# Patient Record
Sex: FEMALE | Race: BLACK OR AFRICAN AMERICAN | NOT HISPANIC OR LATINO | Employment: FULL TIME | URBAN - METROPOLITAN AREA
[De-identification: names, ages, dates, MRNs, and addresses within clinical notes are randomized per-mention and may not be internally consistent; named-entity substitution may affect disease eponyms.]

---

## 2017-10-26 ENCOUNTER — APPOINTMENT (OUTPATIENT)
Dept: LAB | Facility: HOSPITAL | Age: 32
End: 2017-10-26
Payer: COMMERCIAL

## 2017-10-26 ENCOUNTER — TRANSCRIBE ORDERS (OUTPATIENT)
Dept: LAB | Facility: HOSPITAL | Age: 32
End: 2017-10-26

## 2017-10-26 DIAGNOSIS — Z11.1 SCREENING EXAMINATION FOR PULMONARY TUBERCULOSIS: ICD-10-CM

## 2017-10-26 DIAGNOSIS — Z11.1 SCREENING EXAMINATION FOR PULMONARY TUBERCULOSIS: Primary | ICD-10-CM

## 2017-10-26 PROCEDURE — 36415 COLL VENOUS BLD VENIPUNCTURE: CPT

## 2017-10-26 PROCEDURE — 86480 TB TEST CELL IMMUN MEASURE: CPT

## 2017-10-28 LAB
ANNOTATION COMMENT IMP: NORMAL
GAMMA INTERFERON BACKGROUND BLD IA-ACNC: 0.03 IU/ML
M TB IFN-G BLD-IMP: NEGATIVE
M TB IFN-G CD4+ BCKGRND COR BLD-ACNC: <0.01 IU/ML
M TB IFN-G CD4+ T-CELLS BLD-ACNC: 0.02 IU/ML
MITOGEN IGNF BLD-ACNC: 9.94 IU/ML
QUANTIFERON-TB GOLD IN TUBE: NORMAL
SERVICE CMNT-IMP: NORMAL

## 2018-02-13 ENCOUNTER — OFFICE VISIT (OUTPATIENT)
Dept: URGENT CARE | Facility: MEDICAL CENTER | Age: 33
End: 2018-02-13
Payer: COMMERCIAL

## 2018-02-13 VITALS
RESPIRATION RATE: 18 BRPM | DIASTOLIC BLOOD PRESSURE: 82 MMHG | TEMPERATURE: 98 F | SYSTOLIC BLOOD PRESSURE: 142 MMHG | WEIGHT: 230 LBS | HEART RATE: 82 BPM | HEIGHT: 69 IN | BODY MASS INDEX: 34.07 KG/M2 | OXYGEN SATURATION: 97 %

## 2018-02-13 DIAGNOSIS — J01.10 ACUTE FRONTAL SINUSITIS, RECURRENCE NOT SPECIFIED: Primary | ICD-10-CM

## 2018-02-13 PROCEDURE — 99203 OFFICE O/P NEW LOW 30 MIN: CPT | Performed by: FAMILY MEDICINE

## 2018-02-13 PROCEDURE — S9088 SERVICES PROVIDED IN URGENT: HCPCS | Performed by: FAMILY MEDICINE

## 2018-02-13 RX ORDER — AMOXICILLIN 875 MG/1
875 TABLET, COATED ORAL 2 TIMES DAILY
Qty: 20 TABLET | Refills: 0 | Status: SHIPPED | OUTPATIENT
Start: 2018-02-13 | End: 2018-02-23

## 2018-02-13 NOTE — LETTER
February 13, 2018   2  Patient: Jae Caruso   YOB: 1985   Date of Visit: 2/13/2018       To Whom It May Concern: It is my medical opinion that Jae Caruso may return to work on 2/14/2018  If you have any questions or concerns, please don't hesitate to call           Sincerely,        Vahe Mcmillan MD    CC: No Recipients

## 2018-02-13 NOTE — PROGRESS NOTES
Assessment/Plan:    Patient Instructions   I prescribed amoxicillin 875 mg twice a day for 10 days  I encouraged patient continue with Mucinex DM which she is currently taking  Diagnoses and all orders for this visit:    Acute frontal sinusitis, recurrence not specified  -     amoxicillin (AMOXIL) 875 mg tablet; Take 1 tablet (875 mg total) by mouth 2 (two) times a day for 10 days          Subjective:      Patient ID: Nancy Shelley 29 y o  female      30-year-old female here today with 10 day history of sinus pain and pressure  Describes frontal headache, nasal congestion  However, claims to have clear nasal discharge  It is accompanied by postnasal drip  Denies any cough  Currently taking Mucinex DM with minimal improvement  Denies any fever or chills  Headache    Associated symptoms include coughing and sinus pressure  Fatigue   Associated symptoms include coughing, fatigue and headaches  The following portions of the patient's history were reviewed and updated as appropriate: allergies, current medications, past family history, past medical history, past social history, past surgical history and problem list     Review of Systems   Constitutional: Positive for fatigue  HENT: Positive for sinus pain and sinus pressure  Respiratory: Positive for cough  Neurological: Positive for headaches  Objective:    Physical Exam   Constitutional: She appears well-developed and well-nourished  HENT:   Frontal sinus tenderness  Hypertrophic turbinates bilaterally  Neck: Normal range of motion  Neck supple  Cardiovascular: Normal rate, regular rhythm, normal heart sounds and intact distal pulses  Nursing note and vitals reviewed        Vitals:    02/13/18 1300   BP: 142/82   Pulse: 82   Resp: 18   Temp: 98 °F (36 7 °C)   SpO2: 97%   Weight: 104 kg (230 lb)   Height: 5' 9" (1 753 m)

## 2018-02-13 NOTE — PATIENT INSTRUCTIONS
I prescribed amoxicillin 875 mg twice a day for 10 days  I encouraged patient continue with Mucinex DM which she is currently taking  Rhinosinusitis   WHAT YOU NEED TO KNOW:   Rhinosinusitis (RS) is inflammation of your nose and sinuses  It commonly begins as a virus, often as a common cold  Viruses usually last 7 to 10 days and do not need treatment  When the virus does not get better on its own, you may have bacterial RS  This means that bacteria have begun to grow inside your sinuses  Acute RS lasts less than 4 weeks  Chronic RS lasts 12 weeks or more  Recurrent RS is when you have 4 or more episodes of RS in one year  DISCHARGE INSTRUCTIONS:   Return to the emergency department if:   · Your eye and eyelid are red, swollen, and painful  · You cannot open your eye  · You have double vision or you cannot see  · Your eyeball bulges out or you cannot move your eye  · You are more sleepy than normal or you notice changes in your ability to think, move, or talk  · You have a stiff neck, a fever, or a bad headache  · You have swelling of your forehead or scalp  Contact your healthcare provider if:   · Your symptoms are worse or do not improve after 3 to 5 days of treatment  · You have questions or concerns about your condition or care  Medicines: You may need any of the following:  · Acetaminophen  decreases pain and fever  It is available without a doctor's order  Ask how much to take and how often to take it  Follow directions  Acetaminophen can cause liver damage if not taken correctly  · NSAIDs , such as ibuprofen, help decrease swelling, pain, and fever  This medicine is available with or without a doctor's order  NSAIDs can cause stomach bleeding or kidney problems in certain people  If you take blood thinner medicine, always ask your healthcare provider if NSAIDs are safe for you  Always read the medicine label and follow directions      · Nasal steroid sprays  decrease inflammation in your nose and sinuses  · Decongestants  reduce swelling and drain mucus in the nose and sinuses  They may help you breathe easier  · Antihistamines  dry mucus in the nose and relieve sneezing  · Antibiotics  treat a bacterial infection and may be needed if your symptoms do not improve or they get worse  · Take your medicine as directed  Contact your healthcare provider if you think your medicine is not helping or if you have side effects  Tell him or her if you are allergic to any medicine  Keep a list of the medicines, vitamins, and herbs you take  Include the amounts, and when and why you take them  Bring the list or the pill bottles to follow-up visits  Carry your medicine list with you in case of an emergency  Self-care:   · Rinse your sinuses  Use a sinus rinse device to rinse your nasal passages with a saline (salt water) solution  This will help thin the mucus in your nose and rinse away pollen and dirt  It will also help reduce swelling so you can breathe normally  Ask your healthcare provider how often to do this  · Breathe in steam   Heat a bowl of water until you see steam  Lean over the bowl and make a tent over your head with a large towel  Breathe deeply for about 20 minutes  Be careful not to get too close to the steam or burn yourself  Do this 3 times a day  You can also breathe deeply when you take a hot shower  · Sleep with your head elevated  Place an extra pillow under your head before you go to sleep to help your sinuses drain  · Drink liquids as directed  Ask your healthcare provider how much liquid to drink each day and which liquids are best for you  Liquids will thin the mucus in your nose and help it drain  Avoid drinks that contain alcohol or caffeine  · Do not smoke, and avoid secondhand smoke  Nicotine and other chemicals in cigarettes and cigars can make your symptoms worse   Ask your healthcare provider for information if you currently smoke and need help to quit  E-cigarettes or smokeless tobacco still contain nicotine  Talk to your healthcare provider before you use these products  Follow up with your healthcare provider as directed: Follow up if your symptoms are worse or not better after 3 to 5 days of treatment  Write down your questions so you remember to ask them during your visits  © 2017 [employer]  Information is for End User's use only and may not be sold, redistributed or otherwise used for commercial purposes  All illustrations and images included in CareNotes® are the copyrighted property of [employer]  or [employer]  The above information is an  only  It is not intended as medical advice for individual conditions or treatments  Talk to your doctor, nurse or pharmacist before following any medical regimen to see if it is safe and effective for you

## 2018-08-10 ENCOUNTER — HOSPITAL ENCOUNTER (OUTPATIENT)
Dept: RADIOLOGY | Facility: HOSPITAL | Age: 33
Discharge: HOME | End: 2018-08-10
Attending: SURGERY
Payer: COMMERCIAL

## 2018-08-10 ENCOUNTER — TRANSCRIBE ORDERS (OUTPATIENT)
Dept: RADIOLOGY | Facility: HOSPITAL | Age: 33
End: 2018-08-10

## 2018-08-10 DIAGNOSIS — R10.9 ABDOMINAL PAIN, UNSPECIFIED ABDOMINAL LOCATION: Primary | ICD-10-CM

## 2018-08-10 DIAGNOSIS — R10.9 ABDOMINAL PAIN, UNSPECIFIED ABDOMINAL LOCATION: ICD-10-CM

## 2018-08-10 PROCEDURE — 74018 RADEX ABDOMEN 1 VIEW: CPT

## 2018-08-13 ENCOUNTER — TRANSCRIBE ORDERS (OUTPATIENT)
Dept: LAB | Facility: HOSPITAL | Age: 33
End: 2018-08-13

## 2018-08-13 ENCOUNTER — APPOINTMENT (OUTPATIENT)
Dept: LAB | Facility: HOSPITAL | Age: 33
End: 2018-08-13
Payer: COMMERCIAL

## 2018-08-13 DIAGNOSIS — A09 DIARRHEA OF INFECTIOUS ORIGIN: ICD-10-CM

## 2018-08-13 DIAGNOSIS — Z00.8 HEALTH EXAMINATION IN POPULATION SURVEY: Primary | ICD-10-CM

## 2018-08-13 LAB
CAMPYLOBACTER DNA SPEC NAA+PROBE: NORMAL
SALMONELLA DNA SPEC QL NAA+PROBE: NORMAL
SHIGA TOXIN STX GENE SPEC NAA+PROBE: NORMAL
SHIGELLA DNA SPEC QL NAA+PROBE: NORMAL

## 2018-08-13 PROCEDURE — 87505 NFCT AGENT DETECTION GI: CPT

## 2018-08-14 ENCOUNTER — HOSPITAL ENCOUNTER (EMERGENCY)
Facility: HOSPITAL | Age: 33
Discharge: HOME | End: 2018-08-14
Attending: INTERNAL MEDICINE | Admitting: INTERNAL MEDICINE
Payer: COMMERCIAL

## 2018-08-14 ENCOUNTER — ANESTHESIA EVENT (OUTPATIENT)
Dept: GASTROENTEROLOGY | Facility: HOSPITAL | Age: 33
End: 2018-08-14
Payer: COMMERCIAL

## 2018-08-14 VITALS
HEART RATE: 70 BPM | OXYGEN SATURATION: 99 % | DIASTOLIC BLOOD PRESSURE: 81 MMHG | RESPIRATION RATE: 16 BRPM | SYSTOLIC BLOOD PRESSURE: 144 MMHG

## 2018-08-14 DIAGNOSIS — R11.2 INTRACTABLE VOMITING WITH NAUSEA, UNSPECIFIED VOMITING TYPE: Primary | ICD-10-CM

## 2018-08-14 LAB
ALBUMIN SERPL BCP-MCNC: 3.6 G/DL (ref 3.5–5)
ALP SERPL-CCNC: 78 U/L (ref 46–116)
ALT SERPL W P-5'-P-CCNC: 10 U/L (ref 12–78)
ANION GAP SERPL CALCULATED.3IONS-SCNC: 7 MMOL/L (ref 4–13)
AST SERPL W P-5'-P-CCNC: 11 U/L (ref 5–45)
BASOPHILS # BLD AUTO: 0.04 THOUSANDS/ΜL (ref 0–0.1)
BASOPHILS NFR BLD AUTO: 0 % (ref 0–1)
BILIRUB SERPL-MCNC: 0.62 MG/DL (ref 0.2–1)
BUN SERPL-MCNC: 7 MG/DL (ref 5–25)
CALCIUM SERPL-MCNC: 8.9 MG/DL (ref 8.3–10.1)
CHLORIDE SERPL-SCNC: 102 MMOL/L (ref 100–108)
CO2 SERPL-SCNC: 26 MMOL/L (ref 21–32)
CREAT SERPL-MCNC: 1.09 MG/DL (ref 0.6–1.3)
EOSINOPHIL # BLD AUTO: 0.03 THOUSAND/ΜL (ref 0–0.61)
EOSINOPHIL NFR BLD AUTO: 0 % (ref 0–6)
ERYTHROCYTE [DISTWIDTH] IN BLOOD BY AUTOMATED COUNT: 13.5 % (ref 11.6–15.1)
GFR SERPL CREATININE-BSD FRML MDRD: 79 ML/MIN/1.73SQ M
GLUCOSE SERPL-MCNC: 104 MG/DL (ref 65–140)
HCT VFR BLD AUTO: 40.7 % (ref 34.8–46.1)
HGB BLD-MCNC: 13.2 G/DL (ref 11.5–15.4)
IMM GRANULOCYTES # BLD AUTO: 0.03 THOUSAND/UL (ref 0–0.2)
IMM GRANULOCYTES NFR BLD AUTO: 0 % (ref 0–2)
LIPASE SERPL-CCNC: 116 U/L (ref 73–393)
LYMPHOCYTES # BLD AUTO: 1.89 THOUSANDS/ΜL (ref 0.6–4.47)
LYMPHOCYTES NFR BLD AUTO: 19 % (ref 14–44)
MCH RBC QN AUTO: 27.2 PG (ref 26.8–34.3)
MCHC RBC AUTO-ENTMCNC: 32.4 G/DL (ref 31.4–37.4)
MCV RBC AUTO: 84 FL (ref 82–98)
MONOCYTES # BLD AUTO: 0.52 THOUSAND/ΜL (ref 0.17–1.22)
MONOCYTES NFR BLD AUTO: 5 % (ref 4–12)
NEUTROPHILS # BLD AUTO: 7.41 THOUSANDS/ΜL (ref 1.85–7.62)
NEUTS SEG NFR BLD AUTO: 76 % (ref 43–75)
NRBC BLD AUTO-RTO: 0 /100 WBCS
PLATELET # BLD AUTO: 347 THOUSANDS/UL (ref 149–390)
PMV BLD AUTO: 10.7 FL (ref 8.9–12.7)
POTASSIUM SERPL-SCNC: 3.7 MMOL/L (ref 3.5–5.3)
PROT SERPL-MCNC: 7.8 G/DL (ref 6.4–8.2)
RBC # BLD AUTO: 4.85 MILLION/UL (ref 3.81–5.12)
SODIUM SERPL-SCNC: 135 MMOL/L (ref 136–145)
WBC # BLD AUTO: 9.92 THOUSAND/UL (ref 4.31–10.16)

## 2018-08-14 PROCEDURE — 83690 ASSAY OF LIPASE: CPT | Performed by: INTERNAL MEDICINE

## 2018-08-14 PROCEDURE — 80053 COMPREHEN METABOLIC PANEL: CPT | Performed by: INTERNAL MEDICINE

## 2018-08-14 PROCEDURE — 85025 COMPLETE CBC W/AUTO DIFF WBC: CPT | Performed by: INTERNAL MEDICINE

## 2018-08-14 PROCEDURE — 99283 EMERGENCY DEPT VISIT LOW MDM: CPT

## 2018-08-14 PROCEDURE — 36415 COLL VENOUS BLD VENIPUNCTURE: CPT

## 2018-08-14 NOTE — ANESTHESIA PREPROCEDURE EVALUATION
Review of Systems/Medical History  Patient summary reviewed  Chart reviewed  No history of anesthetic complications     Cardiovascular  Negative cardio ROS    Pulmonary  Negative pulmonary ROS        GI/Hepatic      Comment: Intractable vomiting with nausea     Negative  ROS        Endo/Other    Obesity (BMI 33)    GYN  Negative gynecology ROS          Hematology  Negative hematology ROS      Musculoskeletal  Negative musculoskeletal ROS        Neurology  Negative neurology ROS      Psychology   Negative psychology ROS              Physical Exam    Airway    Mallampati score: II  TM Distance: >3 FB       Dental   No notable dental hx     Cardiovascular  Comment: Negative ROS, Rhythm: regular,     Pulmonary  Breath sounds clear to auscultation,     Other Findings        Anesthesia Plan  ASA Score- 2     Anesthesia Type- IV sedation with anesthesia with ASA Monitors  Additional Monitors:   Airway Plan:         Plan Factors-    Induction- intravenous  Postoperative Plan-     Informed Consent- Anesthetic plan and risks discussed with patient  I personally reviewed this patient with the CRNA  Discussed and agreed on the Anesthesia Plan with the CRNA  Leif Snyder

## 2018-08-15 ENCOUNTER — HOSPITAL ENCOUNTER (OUTPATIENT)
Facility: HOSPITAL | Age: 33
Setting detail: OUTPATIENT SURGERY
Discharge: HOME | End: 2018-08-15
Attending: INTERNAL MEDICINE | Admitting: INTERNAL MEDICINE
Payer: COMMERCIAL

## 2018-08-15 ENCOUNTER — ANESTHESIA (OUTPATIENT)
Dept: GASTROENTEROLOGY | Facility: HOSPITAL | Age: 33
End: 2018-08-15
Payer: COMMERCIAL

## 2018-08-15 ENCOUNTER — TELEPHONE (OUTPATIENT)
Dept: GASTROENTEROLOGY | Facility: CLINIC | Age: 33
End: 2018-08-15

## 2018-08-15 VITALS
OXYGEN SATURATION: 100 % | DIASTOLIC BLOOD PRESSURE: 85 MMHG | HEIGHT: 69 IN | TEMPERATURE: 97.8 F | WEIGHT: 225 LBS | BODY MASS INDEX: 33.33 KG/M2 | RESPIRATION RATE: 16 BRPM | SYSTOLIC BLOOD PRESSURE: 134 MMHG | HEART RATE: 73 BPM

## 2018-08-15 DIAGNOSIS — R11.2 INTRACTABLE VOMITING WITH NAUSEA, UNSPECIFIED VOMITING TYPE: ICD-10-CM

## 2018-08-15 PROCEDURE — 43239 EGD BIOPSY SINGLE/MULTIPLE: CPT | Performed by: INTERNAL MEDICINE

## 2018-08-15 PROCEDURE — 88305 TISSUE EXAM BY PATHOLOGIST: CPT | Performed by: PATHOLOGY

## 2018-08-15 RX ORDER — ONDANSETRON 2 MG/ML
4 INJECTION INTRAMUSCULAR; INTRAVENOUS ONCE
Status: COMPLETED | OUTPATIENT
Start: 2018-08-15 | End: 2018-08-15

## 2018-08-15 RX ORDER — PROPOFOL 10 MG/ML
INJECTION, EMULSION INTRAVENOUS AS NEEDED
Status: DISCONTINUED | OUTPATIENT
Start: 2018-08-15 | End: 2018-08-15 | Stop reason: SURG

## 2018-08-15 RX ORDER — PROPOFOL 10 MG/ML
INJECTION, EMULSION INTRAVENOUS CONTINUOUS PRN
Status: DISCONTINUED | OUTPATIENT
Start: 2018-08-15 | End: 2018-08-15 | Stop reason: SURG

## 2018-08-15 RX ORDER — SODIUM CHLORIDE 9 MG/ML
50 INJECTION, SOLUTION INTRAVENOUS CONTINUOUS
Status: DISCONTINUED | OUTPATIENT
Start: 2018-08-15 | End: 2018-08-15 | Stop reason: HOSPADM

## 2018-08-15 RX ORDER — GLYCOPYRROLATE 0.2 MG/ML
INJECTION INTRAMUSCULAR; INTRAVENOUS AS NEEDED
Status: DISCONTINUED | OUTPATIENT
Start: 2018-08-15 | End: 2018-08-15 | Stop reason: SURG

## 2018-08-15 RX ORDER — ONDANSETRON 4 MG/1
4 TABLET, FILM COATED ORAL EVERY 8 HOURS PRN
COMMUNITY
End: 2018-08-22 | Stop reason: SDUPTHER

## 2018-08-15 RX ADMIN — PROPOFOL 70 MG: 10 INJECTION, EMULSION INTRAVENOUS at 10:49

## 2018-08-15 RX ADMIN — PROPOFOL 100 MG: 10 INJECTION, EMULSION INTRAVENOUS at 10:47

## 2018-08-15 RX ADMIN — PROPOFOL 120 MCG/KG/MIN: 10 INJECTION, EMULSION INTRAVENOUS at 10:47

## 2018-08-15 RX ADMIN — ONDANSETRON 4 MG: 2 INJECTION, SOLUTION INTRAMUSCULAR; INTRAVENOUS at 11:24

## 2018-08-15 RX ADMIN — LIDOCAINE HYDROCHLORIDE 100 MG: 20 INJECTION, SOLUTION INTRAVENOUS at 10:47

## 2018-08-15 RX ADMIN — GLYCOPYRROLATE 0.1 MG: 0.2 INJECTION, SOLUTION INTRAMUSCULAR; INTRAVENOUS at 10:42

## 2018-08-15 RX ADMIN — SODIUM CHLORIDE 50 ML/HR: 0.9 INJECTION, SOLUTION INTRAVENOUS at 09:00

## 2018-08-15 NOTE — H&P
History and Physical -  [facility]  Fermin Gonzales 29 y o  female MRN: [id number]        HPI: Fermin Gonzales is a 29y o  year old female who presents for intractable nausea and vomiting during 1 week, patient has been unable to tolerate PO route, no fever or diarrhea, no previous endoscopy      REVIEW OF SYSTEMS: Per the HPI, and otherwise unremarkable  Historical Information   History reviewed  No pertinent past medical history  Past Surgical History:   Procedure Laterality Date    HAND SURGERY Right     KNEE SURGERY Left      Social History   History   Alcohol Use    Yes     Comment: social     History   Drug Use No     History   Smoking Status    Never Smoker   Smokeless Tobacco    Never Used     History reviewed  No pertinent family history  Meds/Allergies     Prescriptions Prior to Admission   Medication    ondansetron (ZOFRAN) 4 mg tablet       No Known Allergies    Objective     Blood pressure 126/76, pulse 81, temperature 97 8 °F (36 6 °C), temperature source Tympanic, resp  rate 16, height 5' 9" (1 753 m), weight 102 kg (225 lb), last menstrual period 08/15/2018, SpO2 96 %  PHYSICAL EXAM    Gen: NAD  CV: RRR  CHEST: Clear  ABD: soft, NT/ND  EXT: no edema      ASSESSMENT/PLAN:  This is a 29y o  year old female here for EGD to rule out gastric outlet obstruction, the patient is stable and optimized for the procedure, we reviewed risk and benefits   Risk include but not limited to infection, bleeding, perforation and missing a lesion    PLAN: perform EGD and possibly biopsy  Procedure: EGD

## 2018-08-15 NOTE — OP NOTE
**** GI/ENDOSCOPY REPORT ****     PATIENT NAME: NAVJOT DAMON - VISIT ID:  Patient ID: [id number]   YOB: 1985     INTRODUCTION: Esophagogastroduodenoscopy - A 29 female patient presents   for an outpatient Esophagogastroduodenoscopy at [facility]  INDICATIONS:     CONSENT: The benefits, risks, and alternatives to the procedure were   discussed and informed consent was obtained from the patient  PREPARATION:  EKG, pulse, pulse oximetry and blood pressure were monitored   throughout the procedure  ASA Classification: Class 2 - Patient has mild   to moderate systemic disturbance that may or may not be related to the   disorder requiring surgery  MEDICATIONS: See anesthesia report  PROCEDURE:  The endoscope was passed without difficulty through the mouth   under direct visualization and advanced to the 2nd portion of the   duodenum  The scope was withdrawn and the mucosa was carefully examined  FINDINGS:   Esophagus: LA Class A esophagitis was found in the esophagus  There was a small hiatus hernia visible in the esophagus  Multiple cold   forceps biopsies was taken from the proximal third of the esophagus  Otherwise, the esophagus appeared to be normal   Stomach: Multiple random   cold forceps biopsies was taken from the body of the stomach  Duodenum:   The duodenal bulb and 2nd portion of the duodenum appeared to be normal  A   cold forceps biopsy was taken  COMPLICATIONS: There were no complications  IMPRESSIONS: Esophagitis seen  A hiatus hernia found  Multiple biopsies   taken  Normal duodenal bulb and 2nd portion of the duodenum  Biopsy taken  RECOMMENDATIONS: Avoid all non-steroidal anti-inflammatory drugs (NSAID's)   including but not limited to Ibuprofen, Advil, Motrin, and Nuprin  Discharge home when standard parameters are met  Continue current   medications  Follow-up on the results of the biopsy specimens   Follow-up appointment with Dr Maury Acosta at [facility] in in 1 week  Check US, check   lipase, CBC and CMP  Can consider ct scan for further eval if symptoms do   not improve     ESTIMATED BLOOD LOSS:     PATHOLOGY SPECIMENS: Multiple cold forceps biopsies biopsies taken from   proximal third of the esophagus  Associated finding: Hiatus hernia  Multiple cold forceps random biopsies taken from the body of the stomach  Cold forceps random biopsy taken  PROCEDURE CODES: 97619 - EGD flexible; with biopsy     ICD-9 Codes: 530 10 Esophagitis, unspecified 553 3 Diaphragmatic hernia   without mention of obstruction or gangrene     ICD-10 Codes: K20 9 Esophagitis, unspecified K44 Diaphragmatic hernia     PERFORMED BY: ANDREY Fung  on 08/15/2018  Version 1, electronically signed by ANDREY Christie  on 08/15/2018 at   11:14

## 2018-08-15 NOTE — ANESTHESIA POSTPROCEDURE EVALUATION
Post-Op Assessment Note      CV Status:  Stable    Mental Status:  Lethargic    Hydration Status:  Euvolemic    PONV Controlled:  Controlled    Airway Patency:  Patent    Post Op Vitals Reviewed: Yes          Staff: CRNA           BP      Temp      Pulse     Resp      SpO2

## 2018-08-15 NOTE — TELEPHONE ENCOUNTER
Contacted patient to schedule appointment voicemail was not accepting messages sent a letter to patient to call for a follow up appointment with dr Marco Antonio Head

## 2018-08-20 ENCOUNTER — APPOINTMENT (OUTPATIENT)
Dept: LAB | Facility: HOSPITAL | Age: 33
End: 2018-08-20

## 2018-08-20 DIAGNOSIS — Z00.8 HEALTH EXAMINATION IN POPULATION SURVEY: ICD-10-CM

## 2018-08-20 DIAGNOSIS — A09 DIARRHEA OF INFECTIOUS ORIGIN: ICD-10-CM

## 2018-08-20 LAB
CHOLEST SERPL-MCNC: 128 MG/DL (ref 50–200)
EST. AVERAGE GLUCOSE BLD GHB EST-MCNC: 126 MG/DL
HBA1C MFR BLD: 6 % (ref 4.2–6.3)
HDLC SERPL-MCNC: 43 MG/DL (ref 40–60)
LDLC SERPL CALC-MCNC: 63 MG/DL (ref 0–100)
NONHDLC SERPL-MCNC: 85 MG/DL
TRIGL SERPL-MCNC: 112 MG/DL

## 2018-08-20 PROCEDURE — 83036 HEMOGLOBIN GLYCOSYLATED A1C: CPT

## 2018-08-20 PROCEDURE — 36415 COLL VENOUS BLD VENIPUNCTURE: CPT

## 2018-08-20 PROCEDURE — 80061 LIPID PANEL: CPT

## 2018-08-22 ENCOUNTER — OFFICE VISIT (OUTPATIENT)
Dept: GASTROENTEROLOGY | Facility: CLINIC | Age: 33
End: 2018-08-22
Payer: COMMERCIAL

## 2018-08-22 VITALS
HEIGHT: 69 IN | TEMPERATURE: 97.6 F | SYSTOLIC BLOOD PRESSURE: 124 MMHG | BODY MASS INDEX: 33.41 KG/M2 | DIASTOLIC BLOOD PRESSURE: 86 MMHG | HEART RATE: 77 BPM | WEIGHT: 225.6 LBS

## 2018-08-22 DIAGNOSIS — K20.90 ESOPHAGITIS: ICD-10-CM

## 2018-08-22 DIAGNOSIS — R11.2 INTRACTABLE VOMITING WITH NAUSEA, UNSPECIFIED VOMITING TYPE: Primary | ICD-10-CM

## 2018-08-22 PROCEDURE — 99204 OFFICE O/P NEW MOD 45 MIN: CPT | Performed by: INTERNAL MEDICINE

## 2018-08-22 RX ORDER — ONDANSETRON 4 MG/1
4 TABLET, FILM COATED ORAL EVERY 8 HOURS PRN
Qty: 20 TABLET | Refills: 0 | Status: SHIPPED | OUTPATIENT
Start: 2018-08-22 | End: 2018-09-05

## 2018-08-22 RX ORDER — OMEPRAZOLE 20 MG/1
20 CAPSULE, DELAYED RELEASE ORAL DAILY
Qty: 60 CAPSULE | Refills: 0 | Status: SHIPPED | OUTPATIENT
Start: 2018-08-22 | End: 2019-02-20 | Stop reason: ALTCHOICE

## 2018-08-22 NOTE — PROGRESS NOTES
[facility] - Outpatient Consultation  Hunter Hagen 29 y o  female MRN: [id number]  Encounter: [id number]          ASSESSMENT AND PLAN:      Esophagitis   - Patient found to have esophagitis on the EGD, persists having epigastric pain along with heartburn, patient persists having episodes of nausea and vomiting probably exacerbating the esophagitis  - Will start PPI  - Follow-up appointment    Nausea and vomiting  - Patient persists having nausea vomiting, persistent nausea and vomiting could be post infectious, another possibility is gallbladder disease, will obtain ultrasound of the abdomen and have follow-up in 2 weeks  - Patient currently getting relief for her symptoms with Zofran, continue Zofran  - If ultrasound is normal will perform gastric emptying study to rule out functional problem    ______________________________________________________________________    HPI:    28-year-old female patient with no significant past medical history, patient complains about nausea and vomiting that started about 2 weeks ago, patient denies any recent diarrhea , the recent exposure to people with GI symptoms around her, she does not drink or smoke, had never similar symptoms in the past   Had a recent endoscopy that found normal stomach and duodenum, she was only found with esophagitis, after endoscopy she has been able to tolerate partially the p o  route, currently she is on Zofran and getting partial relief for her nausea    REVIEW OF SYSTEMS:    CONSTITUTIONAL: Denies any fever, chills, rigors, and weight loss  HEENT: No earache or tinnitus  Denies hearing loss or visual disturbances  CARDIOVASCULAR: No chest pain or palpitations  RESPIRATORY: Denies any cough, hemoptysis, shortness of breath or dyspnea on exertion  GASTROINTESTINAL: As noted in the History of Present Illness  GENITOURINARY: No problems with urination  Denies any hematuria or dysuria    NEUROLOGIC: No dizziness or vertigo, denies headaches  MUSCULOSKELETAL: Denies any muscle or joint pain  SKIN: Denies skin rashes or itching  ENDOCRINE: Denies excessive thirst  Denies intolerance to heat or cold  PSYCHOSOCIAL: Denies depression or anxiety  Denies any recent memory loss  Historical Information   History reviewed  No pertinent past medical history  Past Surgical History:   Procedure Laterality Date    ESOPHAGOGASTRODUODENOSCOPY N/A 8/15/2018    Procedure: ESOPHAGOGASTRODUODENOSCOPY (EGD); Surgeon: Vladimir Santos MD;  Location: [facility]; Service: Gastroenterology    HAND SURGERY Right     KNEE SURGERY Left     UPPER GASTROINTESTINAL ENDOSCOPY       Social History   History   Alcohol Use    Yes     Comment: social     History   Drug Use No     History   Smoking Status    Never Smoker   Smokeless Tobacco    Never Used     History reviewed  No pertinent family history  Meds/Allergies       Current Outpatient Prescriptions:     ondansetron (ZOFRAN) 4 mg tablet    No Known Allergies        Objective     Blood pressure 124/86, pulse 77, temperature 97 6 °F (36 4 °C), temperature source Tympanic, height 5' 9" (1 753 m), weight 102 kg (225 lb 9 6 oz), last menstrual period 08/15/2018  Body mass index is 33 32 kg/m²  PHYSICAL EXAM:      General Appearance:   Alert, cooperative, no distress   HEENT:   Normocephalic, atraumatic, anicteric      Neck:  Supple, symmetrical, trachea midline   Lungs:   Clear to auscultation bilaterally; no rales, rhonchi or wheezing; respirations unlabored    Heart[de-identified]   Regular rate and rhythm; no murmur, rub, or gallop     Abdomen:   Soft, non-tender, non-distended; normal bowel sounds; no masses, no organomegaly    Genitalia:   Deferred    Rectal:   Deferred    Extremities:  No cyanosis, clubbing or edema    Pulses:  2+ and symmetric    Skin:  No jaundice, rashes, or lesions    Lymph nodes:  No palpable cervical lymphadenopathy        Lab Results:   No visits with results within 1 Day(s) from this visit  Latest known visit with results is:   Appointment on 08/20/2018   Component Date Value    Cholesterol 08/20/2018 128     Triglycerides 08/20/2018 112     HDL, Direct 08/20/2018 43     LDL Calculated 08/20/2018 63     Non-HDL-Chol (CHOL-HDL) 08/20/2018 85          Radiology Results:   Xr Abdomen 1 View Kub    Result Date: 8/14/2018  Narrative: ABDOMEN INDICATION:   R10 9: Unspecified abdominal pain  COMPARISON:  None VIEWS:  AP supine FINDINGS: There is a nonobstructive bowel gas pattern  No discernible free air on this supine study  Upright or left lateral decubitus imaging is more sensitive to detect subtle free air in the appropriate setting  No pathologic calcifications or soft tissue masses  Visualized lung bases are clear  IUD device noted in the pelvis overlying the uterine region  Visualized osseous structures are unremarkable for the patient's age  Impression: Unremarkable examination   Workstation performed: [id number]

## 2018-08-23 ENCOUNTER — APPOINTMENT (OUTPATIENT)
Dept: LAB | Facility: HOSPITAL | Age: 33
End: 2018-08-23
Payer: COMMERCIAL

## 2018-08-23 DIAGNOSIS — R11.2 INTRACTABLE VOMITING WITH NAUSEA, UNSPECIFIED VOMITING TYPE: ICD-10-CM

## 2018-08-23 LAB
ALBUMIN SERPL BCP-MCNC: 3.5 G/DL (ref 3.5–5)
ALP SERPL-CCNC: 83 U/L (ref 46–116)
ALT SERPL W P-5'-P-CCNC: 11 U/L (ref 12–78)
ANION GAP SERPL CALCULATED.3IONS-SCNC: 6 MMOL/L (ref 4–13)
AST SERPL W P-5'-P-CCNC: 13 U/L (ref 5–45)
BILIRUB SERPL-MCNC: 0.62 MG/DL (ref 0.2–1)
BUN SERPL-MCNC: 8 MG/DL (ref 5–25)
CALCIUM SERPL-MCNC: 8.6 MG/DL (ref 8.3–10.1)
CHLORIDE SERPL-SCNC: 102 MMOL/L (ref 100–108)
CO2 SERPL-SCNC: 27 MMOL/L (ref 21–32)
CREAT SERPL-MCNC: 1.04 MG/DL (ref 0.6–1.3)
ERYTHROCYTE [DISTWIDTH] IN BLOOD BY AUTOMATED COUNT: 13.4 % (ref 11.6–15.1)
GFR SERPL CREATININE-BSD FRML MDRD: 83 ML/MIN/1.73SQ M
GLUCOSE P FAST SERPL-MCNC: 107 MG/DL (ref 65–99)
HCT VFR BLD AUTO: 41.1 % (ref 34.8–46.1)
HGB BLD-MCNC: 13.1 G/DL (ref 11.5–15.4)
LIPASE SERPL-CCNC: 132 U/L (ref 73–393)
MCH RBC QN AUTO: 27 PG (ref 26.8–34.3)
MCHC RBC AUTO-ENTMCNC: 31.9 G/DL (ref 31.4–37.4)
MCV RBC AUTO: 85 FL (ref 82–98)
PLATELET # BLD AUTO: 352 THOUSANDS/UL (ref 149–390)
PMV BLD AUTO: 10.5 FL (ref 8.9–12.7)
POTASSIUM SERPL-SCNC: 3.7 MMOL/L (ref 3.5–5.3)
PROT SERPL-MCNC: 7.7 G/DL (ref 6.4–8.2)
RBC # BLD AUTO: 4.86 MILLION/UL (ref 3.81–5.12)
SODIUM SERPL-SCNC: 135 MMOL/L (ref 136–145)
WBC # BLD AUTO: 7.41 THOUSAND/UL (ref 4.31–10.16)

## 2018-08-23 PROCEDURE — 85027 COMPLETE CBC AUTOMATED: CPT

## 2018-08-23 PROCEDURE — 83690 ASSAY OF LIPASE: CPT

## 2018-08-23 PROCEDURE — 36415 COLL VENOUS BLD VENIPUNCTURE: CPT

## 2018-08-23 PROCEDURE — 80053 COMPREHEN METABOLIC PANEL: CPT

## 2018-08-24 ENCOUNTER — HOSPITAL ENCOUNTER (OUTPATIENT)
Dept: RADIOLOGY | Facility: HOSPITAL | Age: 33
Discharge: HOME | End: 2018-08-24
Payer: COMMERCIAL

## 2018-08-24 DIAGNOSIS — R11.2 NAUSEA AND VOMITING, INTRACTABILITY OF VOMITING NOT SPECIFIED, UNSPECIFIED VOMITING TYPE: Primary | ICD-10-CM

## 2018-08-24 DIAGNOSIS — R11.2 INTRACTABLE VOMITING WITH NAUSEA, UNSPECIFIED VOMITING TYPE: ICD-10-CM

## 2018-08-24 PROCEDURE — 76705 ECHO EXAM OF ABDOMEN: CPT

## 2018-08-24 RX ORDER — PROMETHAZINE HYDROCHLORIDE 25 MG/1
25 TABLET ORAL EVERY 6 HOURS PRN
Qty: 30 TABLET | Refills: 0 | Status: SHIPPED | OUTPATIENT
Start: 2018-08-24 | End: 2019-02-20 | Stop reason: ALTCHOICE

## 2018-09-05 ENCOUNTER — OFFICE VISIT (OUTPATIENT)
Dept: GASTROENTEROLOGY | Facility: CLINIC | Age: 33
End: 2018-09-05
Payer: COMMERCIAL

## 2018-09-05 VITALS
SYSTOLIC BLOOD PRESSURE: 127 MMHG | DIASTOLIC BLOOD PRESSURE: 85 MMHG | HEART RATE: 84 BPM | TEMPERATURE: 98.9 F | WEIGHT: 230 LBS | BODY MASS INDEX: 34.07 KG/M2 | HEIGHT: 69 IN

## 2018-09-05 DIAGNOSIS — K20.90 ESOPHAGITIS: Primary | ICD-10-CM

## 2018-09-05 DIAGNOSIS — R11.2 INTRACTABLE VOMITING WITH NAUSEA, UNSPECIFIED VOMITING TYPE: ICD-10-CM

## 2018-09-05 PROCEDURE — 99214 OFFICE O/P EST MOD 30 MIN: CPT | Performed by: INTERNAL MEDICINE

## 2018-09-05 NOTE — PROGRESS NOTES
[facility] - Outpatient Follow-up Note  Ryan Hart 34 y o  female MRN: [id number]  Encounter: [id number]          ASSESSMENT AND PLAN:      1  Esophagitis  -emphasize the importance of regular use of PPI at least 30 60 minutes before 1st meal of the day to allow for healing of the esophagitis which may be contributing to her nausea   -advised her on GERD diet    2  Intractable vomiting with nausea, unspecified vomiting type  -appears to be improving   -right upper quadrant sonogram normal  -Zofran ineffective but Phenergan appears to be controlling her symptoms  -given her bloating and what sounds like could not been a gastroenteritis-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  -advised on low FODMAP diet and starting probiotic daily    -will have her come back in 2 months for reassessment of symptoms  If still no improvement may consider further testing such as gastric emptying study  May consider treatment for SIBO    ______________________________________________________________________    SUBJECTIVE:  Patient's vomiting has resolved, however nausea still persists but it is controlled with Phenergan  Patient also complaining of bloating and occasional heartburn  Patient is not taking her Prilosec on daily basis stating that she forgets and not like to take pills  Her diet is not the healthiest-including fried foods  Patient denies abdominal pain      REVIEW OF SYSTEMS IS OTHERWISE NEGATIVE  Historical Information   History reviewed  No pertinent past medical history  Past Surgical History:   Procedure Laterality Date    ESOPHAGOGASTRODUODENOSCOPY N/A 8/15/2018    Procedure: ESOPHAGOGASTRODUODENOSCOPY (EGD); Surgeon: Costa Omer MD;  Location: [facility];   Service: Gastroenterology    HAND SURGERY Right     KNEE SURGERY Left     UPPER GASTROINTESTINAL ENDOSCOPY       Social History   History   Alcohol Use    Yes     Comment: social     History   Drug Use No History   Smoking Status    Never Smoker   Smokeless Tobacco    Never Used     History reviewed  No pertinent family history  Meds/Allergies       Current Outpatient Prescriptions:     omeprazole (PriLOSEC) 20 mg delayed release capsule    ondansetron (ZOFRAN) 4 mg tablet    promethazine (PHENERGAN) 25 mg tablet    No Known Allergies        Objective     Blood pressure 127/85, pulse 84, temperature 98 9 °F (37 2 °C), temperature source Tympanic, height 5' 9" (1 753 m), weight 104 kg (230 lb), last menstrual period 08/15/2018  Body mass index is 33 97 kg/m²  PHYSICAL EXAM:      General Appearance:   Alert, cooperative, no distress   HEENT:   Normocephalic, atraumatic, anicteric      Neck:  Supple, symmetrical, trachea midline   Lungs:   Clear to auscultation bilaterally; no rales, rhonchi or wheezing; respirations unlabored    Heart[de-identified]   Regular rate and rhythm; no murmur, rub, or gallop  Abdomen:   Soft, non-tender, non-distended; normal bowel sounds; no masses, no organomegaly    Genitalia:   Deferred    Rectal:   Deferred    Extremities:  No cyanosis, clubbing or edema    Pulses:  2+ and symmetric    Skin:  No jaundice, rashes, or lesions    Lymph nodes:  No palpable cervical lymphadenopathy        Lab Results:   No visits with results within 1 Day(s) from this visit     Latest known visit with results is:   Appointment on 08/23/2018   Component Date Value    Sodium 08/23/2018 135*    Potassium 08/23/2018 3 7     Chloride 08/23/2018 102     CO2 08/23/2018 27     ANION GAP 08/23/2018 6     BUN 08/23/2018 8     Creatinine 08/23/2018 1 04     Glucose, Fasting 08/23/2018 107*    Calcium 08/23/2018 8 6     AST 08/23/2018 13     ALT 08/23/2018 11*    Alkaline Phosphatase 08/23/2018 83     Total Protein 08/23/2018 7 7     Albumin 08/23/2018 3 5     Total Bilirubin 08/23/2018 0 62     eGFR 08/23/2018 83     WBC 08/23/2018 7 41     RBC 08/23/2018 4 86     Hemoglobin 08/23/2018 13 1     Hematocrit 08/23/2018 41 1     MCV 08/23/2018 85     MCH 08/23/2018 27 0     MCHC 08/23/2018 31 9     RDW 08/23/2018 13 4     Platelets 15/62/1891 352     MPV 08/23/2018 10 5     Lipase 08/23/2018 132          Radiology Results:   Xr Abdomen 1 View Kub    Result Date: 8/14/2018  Narrative: ABDOMEN INDICATION:   R10 9: Unspecified abdominal pain  COMPARISON:  None VIEWS:  AP supine FINDINGS: There is a nonobstructive bowel gas pattern  No discernible free air on this supine study  Upright or left lateral decubitus imaging is more sensitive to detect subtle free air in the appropriate setting  No pathologic calcifications or soft tissue masses  Visualized lung bases are clear  IUD device noted in the pelvis overlying the uterine region  Visualized osseous structures are unremarkable for the patient's age  Impression: Unremarkable examination  Workstation performed: [id number]     Us Liver    Result Date: 8/28/2018  Narrative: RIGHT UPPER QUADRANT ULTRASOUND INDICATION:     R11 2: Nausea with vomiting, unspecified  COMPARISON:  X-ray 8/10/2018 TECHNIQUE:   Real-time ultrasound of the right upper quadrant was performed with a curvilinear transducer with both volumetric sweeps and still imaging techniques  FINDINGS: PANCREAS:  Visualized portions of the pancreas are within normal limits  AORTA AND IVC:  Visualized portions are normal for patient age  LIVER: Size:  Within normal range  The liver measures 14 3 cm in the midclavicular line  Contour:  Surface contour is smooth  Parenchyma:  Echogenicity and echotexture are within normal limits  No evidence of suspicious mass  Limited imaging of the main portal vein shows it to be patent and hepatopetal   BILIARY: The gallbladder is normal in caliber  No wall thickening or pericholecystic fluid  No stones or sludge identified  No sonographic Cortés's sign  No intrahepatic biliary dilatation  CBD measures 3 mm  No choledocholithiasis   KIDNEY: Right kidney measures 10 7 cm  Within normal limits  Contains a tiny cystic structure ASCITES:   None       Impression: Normal  Workstation performed: [id number]

## 2018-12-02 ENCOUNTER — OFFICE VISIT (OUTPATIENT)
Dept: URGENT CARE | Facility: CLINIC | Age: 33
End: 2018-12-02
Payer: COMMERCIAL

## 2018-12-02 VITALS
BODY MASS INDEX: 33.18 KG/M2 | SYSTOLIC BLOOD PRESSURE: 102 MMHG | TEMPERATURE: 98.2 F | OXYGEN SATURATION: 98 % | WEIGHT: 224 LBS | HEIGHT: 69 IN | HEART RATE: 73 BPM | DIASTOLIC BLOOD PRESSURE: 64 MMHG | RESPIRATION RATE: 20 BRPM

## 2018-12-02 DIAGNOSIS — J01.00 ACUTE NON-RECURRENT MAXILLARY SINUSITIS: Primary | ICD-10-CM

## 2018-12-02 PROCEDURE — 99213 OFFICE O/P EST LOW 20 MIN: CPT | Performed by: PHYSICIAN ASSISTANT

## 2018-12-02 PROCEDURE — S9088 SERVICES PROVIDED IN URGENT: HCPCS | Performed by: PHYSICIAN ASSISTANT

## 2018-12-02 RX ORDER — PREDNISONE 10 MG/1
30 TABLET ORAL DAILY
Qty: 15 TABLET | Refills: 0 | Status: SHIPPED | OUTPATIENT
Start: 2018-12-02 | End: 2018-12-07

## 2018-12-02 RX ORDER — AMOXICILLIN AND CLAVULANATE POTASSIUM 875; 125 MG/1; MG/1
1 TABLET, FILM COATED ORAL EVERY 12 HOURS SCHEDULED
Qty: 14 TABLET | Refills: 0 | Status: SHIPPED | OUTPATIENT
Start: 2018-12-02 | End: 2018-12-09

## 2018-12-02 NOTE — LETTER
December 2, 2018     Patient: Yogesh Jacobo   YOB: 1985   Date of Visit: 12/2/2018       To Whom it May Concern:    Yogesh Jacobo was seen in my clinic on 12/2/2018  She may return to work on 12/04/2018  If you have any questions or concerns, please don't hesitate to call           Sincerely,          Richardson Barfield PA-C        CC: No Recipients

## 2018-12-02 NOTE — PATIENT INSTRUCTIONS
Take prednisone 30 mg x 5 days  Take with food  Augmentin twice daily for 7 days  Take with food and eat yogurt or a probiotic daily to decrease gI upset  Continue nasal sprays  Increase fluid intake  Watch for fevers  Follow up with your PCP for persistent symptoms  Go to the ER for any distress

## 2018-12-02 NOTE — PROGRESS NOTES
[facility]        NAME: Micheal Chavez is a 34 y o  female  : 1985    MRN: [id number]  DATE: 2018  TIME: 2:43 PM    Assessment and Plan   Acute non-recurrent maxillary sinusitis [J01 00]  1  Acute non-recurrent maxillary sinusitis  predniSONE 10 mg tablet    amoxicillin-clavulanate (AUGMENTIN) 875-125 mg per tablet         Patient Instructions     Take prednisone 30 mg x 5 days  Take with food  Augmentin twice daily for 7 days  Take with food and eat yogurt or a probiotic daily to decrease gI upset  Continue nasal sprays  Increase fluid intake  Watch for fevers  Follow up with PCP in 3-5 days  Proceed to  ER if symptoms worsen  Chief Complaint     Chief Complaint   Patient presents with    Cough     patient reports she started with a sore throat x 1 week ago, productive cough 2 days ago,sinus pain,headache, and feeling tired  History of Present Illness       This is a 34year old female presenting for URI symptoms x 1 week  She reports sinus congestion, facial pain, ear pressure  She did have a sore throat which is resolved, but facial pain and pressure continues to worsen  She just started with cough yesterday, productive of yellow sputum  She has been using ibuprofen and increased fluids without relief  No shortness of breath  No documented fever but is waking up in cold sweats  Review of Systems   Review of Systems   Constitutional: Positive for chills and fatigue  Negative for fever  HENT: Positive for congestion, ear pain, postnasal drip, rhinorrhea, sinus pain, sinus pressure and sore throat  Negative for ear discharge  Respiratory: Positive for cough and chest tightness  Negative for shortness of breath  Gastrointestinal: Negative for diarrhea, nausea and vomiting  Neurological: Positive for headaches  Negative for dizziness           Current Medications       Current Outpatient Prescriptions:     amoxicillin-clavulanate (AUGMENTIN) 875-125 mg per tablet, Take 1 tablet by mouth every 12 (twelve) hours for 7 days, Disp: 14 tablet, Rfl: 0    omeprazole (PriLOSEC) 20 mg delayed release capsule, Take 1 capsule (20 mg total) by mouth daily for 60 days, Disp: 60 capsule, Rfl: 0    predniSONE 10 mg tablet, Take 3 tablets (30 mg total) by mouth daily for 5 days, Disp: 15 tablet, Rfl: 0    promethazine (PHENERGAN) 25 mg tablet, Take 1 tablet (25 mg total) by mouth every 6 (six) hours as needed for nausea or vomiting (Patient not taking: Reported on 12/2/2018 ), Disp: 30 tablet, Rfl: 0    Current Allergies     Allergies as of 12/02/2018    (No Known Allergies)            The following portions of the patient's history were reviewed and updated as appropriate: allergies, current medications, past family history, past medical history, past social history, past surgical history and problem list      History reviewed  No pertinent past medical history  Past Surgical History:   Procedure Laterality Date    ESOPHAGOGASTRODUODENOSCOPY N/A 8/15/2018    Procedure: ESOPHAGOGASTRODUODENOSCOPY (EGD); Surgeon: Mabel Aly MD;  Location:  GI LAB; Service: Gastroenterology    HAND SURGERY Right     KNEE SURGERY Left     UPPER GASTROINTESTINAL ENDOSCOPY         No family history on file  Medications have been verified  Objective   /64   Pulse 73   Temp 98 2 °F (36 8 °C)   Resp 20   Ht 5' 9" (1 753 m)   Wt 102 kg (224 lb)   SpO2 98%   BMI 33 08 kg/m²        Physical Exam     Physical Exam   Constitutional: She appears well-developed and well-nourished  No distress  HENT:   Right Ear: Tympanic membrane, external ear and ear canal normal    Left Ear: External ear and ear canal normal  A middle ear effusion is present  Nose: Mucosal edema and rhinorrhea present  Right sinus exhibits maxillary sinus tenderness  Right sinus exhibits no frontal sinus tenderness  Left sinus exhibits maxillary sinus tenderness   Left sinus exhibits no frontal sinus tenderness  Mouth/Throat: Uvula is midline and mucous membranes are normal  Posterior oropharyngeal edema and posterior oropharyngeal erythema present  No oropharyngeal exudate  Eyes: Conjunctivae are normal    Neck: Normal range of motion  Neck supple  Cardiovascular: Normal rate, regular rhythm and normal heart sounds  Pulmonary/Chest: Effort normal and breath sounds normal  No respiratory distress  She has no decreased breath sounds  She has no wheezes  She has no rales  Neurological: She is alert  Skin: Skin is warm and dry  She is not diaphoretic  Nursing note and vitals reviewed

## 2018-12-19 ENCOUNTER — OFFICE VISIT (OUTPATIENT)
Dept: URGENT CARE | Facility: CLINIC | Age: 33
End: 2018-12-19
Payer: COMMERCIAL

## 2018-12-19 VITALS
HEIGHT: 69 IN | OXYGEN SATURATION: 96 % | SYSTOLIC BLOOD PRESSURE: 145 MMHG | DIASTOLIC BLOOD PRESSURE: 82 MMHG | TEMPERATURE: 99 F | WEIGHT: 242 LBS | HEART RATE: 91 BPM | BODY MASS INDEX: 35.84 KG/M2 | RESPIRATION RATE: 16 BRPM

## 2018-12-19 DIAGNOSIS — B96.89 BACTERIAL SINUSITIS: Primary | ICD-10-CM

## 2018-12-19 DIAGNOSIS — B37.3 VAGINAL YEAST INFECTION: ICD-10-CM

## 2018-12-19 DIAGNOSIS — J32.9 BACTERIAL SINUSITIS: Primary | ICD-10-CM

## 2018-12-19 PROCEDURE — S9088 SERVICES PROVIDED IN URGENT: HCPCS | Performed by: PHYSICIAN ASSISTANT

## 2018-12-19 PROCEDURE — 99213 OFFICE O/P EST LOW 20 MIN: CPT | Performed by: PHYSICIAN ASSISTANT

## 2018-12-19 RX ORDER — FLUTICASONE PROPIONATE 50 MCG
1 SPRAY, SUSPENSION (ML) NASAL DAILY
Qty: 16 G | Refills: 0 | Status: SHIPPED | OUTPATIENT
Start: 2018-12-19 | End: 2019-02-20 | Stop reason: ALTCHOICE

## 2018-12-19 RX ORDER — LEVOFLOXACIN 750 MG/1
750 TABLET ORAL EVERY 24 HOURS
Qty: 7 TABLET | Refills: 0 | Status: SHIPPED | OUTPATIENT
Start: 2018-12-19 | End: 2018-12-26

## 2018-12-19 RX ORDER — FLUCONAZOLE 150 MG/1
150 TABLET ORAL ONCE
Qty: 1 TABLET | Refills: 1 | Status: SHIPPED | OUTPATIENT
Start: 2018-12-19 | End: 2018-12-19

## 2018-12-19 NOTE — PATIENT INSTRUCTIONS
Be sure to take antibiotic as directed for the entire week  Diflucan is a one time medication  There is one refill in the event that your symptoms do not improve you may take a second dose on day 3

## 2018-12-19 NOTE — PROGRESS NOTES
[facility]        NAME: Teofilo Bhakta is a 34 y o  female  : 1985    MRN: [id number]  DATE: 2018  TIME: 4:35 PM    Assessment and Plan   Bacterial sinusitis [J32 9, B96 89]  1  Bacterial sinusitis  levofloxacin (LEVAQUIN) 750 mg tablet    fluticasone (FLONASE) 50 mcg/act nasal spray   2  Vaginal yeast infection  fluconazole (DIFLUCAN) 150 mg tablet     Pt recently treated for sinus infection but did not take the antibiotic as directed and now has return of symptoms as well as symptoms of vaginal yeast infection  Patient Instructions     Patient Instructions   Be sure to take antibiotic as directed for the entire week  Diflucan is a one time medication  There is one refill in the event that your symptoms do not improve you may take a second dose on day 3  Proceed to  ER if symptoms worsen  Chief Complaint     Chief Complaint   Patient presents with    Headache    Facial Pain     runny nose x 2    Vaginal Discharge     itching         History of Present Illness       Pt was seen in urgent care the beginning of the month and treated for a sinus infection  She admits that she did not take the antibiotic as directed and actually has a couple doses left  She states over the past few days she has noticed sinus pain and pressure returning  She has a mild cough from post nasal drainage  No fever  She also believes she has a yeast infection  3 days ago she began having vaginal itching and today noticed a thick white discharge  No pelvic pain or concern for STI  LMP 2 weeks ago  Review of Systems   Review of Systems   Constitutional: Negative for chills and fever  HENT: Positive for congestion and postnasal drip  Negative for ear pain and sore throat  Respiratory: Positive for cough  Negative for shortness of breath  Skin: Negative  Neurological: Negative            Current Medications       Current Outpatient Prescriptions:     fluconazole (DIFLUCAN) 150 mg tablet, Take 1 tablet (150 mg total) by mouth once for 1 dose, Disp: 1 tablet, Rfl: 1    fluticasone (FLONASE) 50 mcg/act nasal spray, 1 spray into each nostril daily, Disp: 16 g, Rfl: 0    levofloxacin (LEVAQUIN) 750 mg tablet, Take 1 tablet (750 mg total) by mouth every 24 hours for 7 days, Disp: 7 tablet, Rfl: 0    omeprazole (PriLOSEC) 20 mg delayed release capsule, Take 1 capsule (20 mg total) by mouth daily for 60 days, Disp: 60 capsule, Rfl: 0    promethazine (PHENERGAN) 25 mg tablet, Take 1 tablet (25 mg total) by mouth every 6 (six) hours as needed for nausea or vomiting (Patient not taking: Reported on 12/2/2018 ), Disp: 30 tablet, Rfl: 0    Current Allergies     Allergies as of 12/19/2018    (No Known Allergies)            The following portions of the patient's history were reviewed and updated as appropriate: allergies, current medications, past family history, past medical history, past social history, past surgical history and problem list      History reviewed  No pertinent past medical history  Past Surgical History:   Procedure Laterality Date    ESOPHAGOGASTRODUODENOSCOPY N/A 8/15/2018    Procedure: ESOPHAGOGASTRODUODENOSCOPY (EGD); Surgeon: Yasemin Taveras MD;  [facility]; Service: Gastroenterology    HAND SURGERY Right     KNEE SURGERY Left     UPPER GASTROINTESTINAL ENDOSCOPY         History reviewed  No pertinent family history  Medications have been verified  Objective   /82   Pulse 91   Temp 99 °F (37 2 °C)   Resp 16   Ht 5' 9" (1 753 m)   Wt 110 kg (242 lb)   SpO2 96%   BMI 35 74 kg/m²        Physical Exam     Physical Exam   Constitutional: She appears well-developed  No distress  HENT:   Right Ear: Tympanic membrane, external ear and ear canal normal    Left Ear: Tympanic membrane, external ear and ear canal normal    Nose: Mucosal edema present   Right sinus exhibits no maxillary sinus tenderness and no frontal sinus tenderness  Left sinus exhibits no maxillary sinus tenderness and no frontal sinus tenderness  Mouth/Throat: Mucous membranes are normal  No oropharyngeal exudate, posterior oropharyngeal edema or posterior oropharyngeal erythema  Post nasal drip   Eyes: Conjunctivae are normal    Neck: Normal range of motion  Cardiovascular: Normal rate and regular rhythm  Pulmonary/Chest: Effort normal and breath sounds normal    Lymphadenopathy:     She has no cervical adenopathy  Skin: Skin is warm and dry  Nursing note and vitals reviewed

## 2019-01-30 ENCOUNTER — OFFICE VISIT (OUTPATIENT)
Dept: URGENT CARE | Facility: CLINIC | Age: 34
End: 2019-01-30
Payer: COMMERCIAL

## 2019-01-30 VITALS
RESPIRATION RATE: 18 BRPM | TEMPERATURE: 98.6 F | DIASTOLIC BLOOD PRESSURE: 82 MMHG | HEIGHT: 68 IN | SYSTOLIC BLOOD PRESSURE: 124 MMHG | WEIGHT: 235 LBS | OXYGEN SATURATION: 98 % | HEART RATE: 92 BPM | BODY MASS INDEX: 35.61 KG/M2

## 2019-01-30 DIAGNOSIS — R09.81 NASAL SINUS CONGESTION: ICD-10-CM

## 2019-01-30 DIAGNOSIS — J01.91 ACUTE RECURRENT SINUSITIS, UNSPECIFIED LOCATION: Primary | ICD-10-CM

## 2019-01-30 PROCEDURE — 99213 OFFICE O/P EST LOW 20 MIN: CPT | Performed by: PHYSICIAN ASSISTANT

## 2019-01-30 PROCEDURE — S9088 SERVICES PROVIDED IN URGENT: HCPCS | Performed by: PHYSICIAN ASSISTANT

## 2019-01-30 RX ORDER — PREDNISONE 20 MG/1
20 TABLET ORAL 2 TIMES DAILY WITH MEALS
Qty: 10 TABLET | Refills: 0 | Status: SHIPPED | OUTPATIENT
Start: 2019-01-30 | End: 2019-02-20 | Stop reason: ALTCHOICE

## 2019-01-30 NOTE — PATIENT INSTRUCTIONS
Take medication as prescribed  Claritin D may be used for the next week    If symptoms do not improve or return again you need to follow-up with your family doctor or an ENT specialist

## 2019-01-30 NOTE — PROGRESS NOTES
[facility]        NAME: Darien Peterson is a 34 y o  female  : 1985    MRN: [id number]  DATE: 2019  TIME: 1:35 PM    Assessment and Plan   Acute recurrent sinusitis, unspecified location [J01 91]  1  Acute recurrent sinusitis, unspecified location     2  Nasal sinus congestion  predniSONE 20 mg tablet     Patient was seen here last month for similar symptoms  We discussed the need for her to find a primary doctor and possibly see ENT due to the recurrence symptoms she is experiencing  This could be related to an allergen in this area which she did not experience while in [place]  She currently does not have a fever or any facial pain  Will not treat with antibiotics at this time  Patient Instructions   Patient Instructions   Take medication as prescribed  Claritin D may be used for the next week  If symptoms do not improve or return again you need to follow-up with your family doctor or an ENT specialist         Proceed to  ER if symptoms worsen  Chief Complaint     Chief Complaint   Patient presents with    Nasal Congestion     past 5-6 days; mucinex "not working"         History of Present Illness       Patient presents with chief complaint of worsening sinus congestion that began 4 days ago  She was evaluated last month on 2 occasions been diagnosed with sinusitis  She completed a course of antibiotics and steroids  She also has been using Flonase daily in reports this has been helping until this past weekend  She believes she may have missed a dose of the nasal spray  She denies any fevers or chills  She reports a lot postnasal drainage  No facial pain or pressure  She does not have a family doctor  Moved here from [place] last year no issues with sinus congestion or allergies in the past          Review of Systems   Review of Systems   Constitutional: Negative for chills and fever  HENT: Positive for congestion and postnasal drip   Negative for sinus pain, sinus pressure and sore throat  Eyes: Negative  Respiratory: Negative  Cardiovascular: Negative  Gastrointestinal: Negative  Musculoskeletal: Negative  Allergic/Immunologic: Negative  Neurological: Negative  Hematological: Negative  Current Medications       Current Outpatient Prescriptions:     fluticasone (FLONASE) 50 mcg/act nasal spray, 1 spray into each nostril daily, Disp: 16 g, Rfl: 0    omeprazole (PriLOSEC) 20 mg delayed release capsule, Take 1 capsule (20 mg total) by mouth daily for 60 days, Disp: 60 capsule, Rfl: 0    predniSONE 20 mg tablet, Take 1 tablet (20 mg total) by mouth 2 (two) times a day with meals, Disp: 10 tablet, Rfl: 0    promethazine (PHENERGAN) 25 mg tablet, Take 1 tablet (25 mg total) by mouth every 6 (six) hours as needed for nausea or vomiting (Patient not taking: Reported on 12/2/2018 ), Disp: 30 tablet, Rfl: 0    Current Allergies     Allergies as of 01/30/2019    (No Known Allergies)            The following portions of the patient's history were reviewed and updated as appropriate: allergies, current medications, past family history, past medical history, past social history, past surgical history and problem list      No past medical history on file  Past Surgical History:   Procedure Laterality Date    ESOPHAGOGASTRODUODENOSCOPY N/A 8/15/2018    Procedure: ESOPHAGOGASTRODUODENOSCOPY (EGD); Surgeon: Rachael Spaulding MD;  [facility]; Service: Gastroenterology    HAND SURGERY Right     KNEE SURGERY Left     UPPER GASTROINTESTINAL ENDOSCOPY         No family history on file  Medications have been verified  Objective   /82 (BP Location: Left arm, Patient Position: Sitting, Cuff Size: Large)   Pulse 92   Temp 98 6 °F (37 °C) (Tympanic Core)   Resp 18   Ht 5' 8" (1 727 m)   Wt 107 kg (235 lb)   SpO2 98%   BMI 35 73 kg/m²        Physical Exam     Physical Exam   Constitutional: She appears well-developed   No distress  HENT:   Right Ear: Tympanic membrane, external ear and ear canal normal    Left Ear: Tympanic membrane, external ear and ear canal normal    Nose: Mucosal edema present  Right sinus exhibits no maxillary sinus tenderness and no frontal sinus tenderness  Left sinus exhibits no maxillary sinus tenderness and no frontal sinus tenderness  Mouth/Throat: Mucous membranes are normal  No oropharyngeal exudate, posterior oropharyngeal edema or posterior oropharyngeal erythema  Post nasal drip  Neck: Normal range of motion  Cardiovascular: Normal rate and regular rhythm  Pulmonary/Chest: Effort normal and breath sounds normal    Lymphadenopathy:     She has no cervical adenopathy  Skin: Skin is warm and dry  Nursing note and vitals reviewed

## 2019-02-20 ENCOUNTER — OFFICE VISIT (OUTPATIENT)
Dept: FAMILY MEDICINE CLINIC | Facility: CLINIC | Age: 34
End: 2019-02-20
Payer: COMMERCIAL

## 2019-02-20 VITALS
BODY MASS INDEX: 36.18 KG/M2 | WEIGHT: 238.69 LBS | HEART RATE: 76 BPM | DIASTOLIC BLOOD PRESSURE: 76 MMHG | RESPIRATION RATE: 16 BRPM | HEIGHT: 68 IN | SYSTOLIC BLOOD PRESSURE: 122 MMHG

## 2019-02-20 DIAGNOSIS — Z78.9 USES BIRTH CONTROL: ICD-10-CM

## 2019-02-20 DIAGNOSIS — J30.89 NON-SEASONAL ALLERGIC RHINITIS, UNSPECIFIED TRIGGER: ICD-10-CM

## 2019-02-20 DIAGNOSIS — E66.9 OBESITY (BMI 30.0-34.9): ICD-10-CM

## 2019-02-20 DIAGNOSIS — R00.2 PALPITATIONS: Primary | ICD-10-CM

## 2019-02-20 PROBLEM — R11.2 INTRACTABLE VOMITING WITH NAUSEA: Status: RESOLVED | Noted: 2018-08-14 | Resolved: 2019-02-20

## 2019-02-20 PROBLEM — K20.90 ESOPHAGITIS: Status: RESOLVED | Noted: 2018-08-22 | Resolved: 2019-02-20

## 2019-02-20 PROCEDURE — 3008F BODY MASS INDEX DOCD: CPT | Performed by: PHYSICIAN ASSISTANT

## 2019-02-20 PROCEDURE — 99203 OFFICE O/P NEW LOW 30 MIN: CPT | Performed by: PHYSICIAN ASSISTANT

## 2019-02-20 RX ORDER — TRIAMCINOLONE ACETONIDE 55 UG/1
2 SPRAY, METERED NASAL DAILY
Qty: 1 BOTTLE | Refills: 0
Start: 2019-02-20 | End: 2019-04-18

## 2019-02-20 RX ORDER — LORATADINE 10 MG/1
10 TABLET ORAL DAILY
Start: 2019-02-20 | End: 2019-04-18

## 2019-02-20 NOTE — PROGRESS NOTES
Assessment/Plan:    1  Palpitations    - CBC, CMP recently normal, will obtain TSH to be complete, echo/holter ordered, consider cardio follow up  - TSH, 3rd generation with Free T4 reflex; Future  - Echo complete with contrast if indicated; Future  - Holter monitor - 48 hour; Future    2  Non-seasonal allergic rhinitis, unspecified trigger    - c/w Claritin and Nasacort  - Triamcinolone Acetonide 55 MCG/ACT AERO; 2 Act (110 mcg total) into each nostril daily  Dispense: 1 Bottle; Refill: 0  - loratadine (CLARITIN) 10 mg tablet; Take 1 tablet (10 mg total) by mouth daily    Pap - obtain last     F/u as needed    Subjective:   Chief Complaint   Patient presents with   [address]    Physical Exam      Patient ID: Rina Billings is a 34 y o  female  Patient here to establish care  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  Has been going to Urgent Care for allergies and sinus congestion  They advised she establish here  Has been getting frequent sinus infections  Recently started claritin and nasacort has not had a problem since  Never had allergies prior to moving here  For a few years has been having palpitations  Will be sitting and feeling heart racing  Will come on randomly and resolve randomly  Can last up to 15 minutes, Can happen 3-4 times a day, can skip days  Has been tracking on phone/apple watch  HR would go in 120s  Tried valsalva, carotid massage does not help  Cannot associate with anything  Had EKG and labs in [place] all normal advised to get holter and echo  Mom went to hospital with similar symptoms went to ER had echo, labs, ekg all normal  Has been seeing cardiology  The following portions of the patient's history were reviewed and updated as appropriate: allergies, current medications, past family history, past medical history, past social history, past surgical history and problem list     History reviewed   No pertinent past medical history  Past Surgical History:   Procedure Laterality Date    ESOPHAGOGASTRODUODENOSCOPY N/A 8/15/2018    Procedure: ESOPHAGOGASTRODUODENOSCOPY (EGD); Surgeon: Abelardo Coats MD;  [facility];   Service: Gastroenterology    HAND SURGERY Right     KNEE SURGERY Left     UPPER GASTROINTESTINAL ENDOSCOPY       Family History   Problem Relation Age of Onset    Depression Mother     Hypertension Mother     Diabetes Father     Hypertension Father     Cancer Father     Heart failure Father     No Known Problems Sister     No Known Problems Brother     Diabetes Paternal Grandmother     Hypertension Paternal Grandmother     Alcohol abuse Maternal Uncle     Depression Maternal Uncle     Mental illness Maternal Uncle     Alcohol abuse Cousin     Depression Cousin     Mental illness Cousin     Substance Abuse Neg Hx      Social History     Socioeconomic History    Marital status: Single     Spouse name: Not on file    Number of children: Not on file    Years of education: Not on file    Highest education level: Not on file   Occupational History    Not on file   Social Needs    Financial resource strain: Not on file    Food insecurity:     Worry: Not on file     Inability: Not on file    Transportation needs:     Medical: Not on file     Non-medical: Not on file   Tobacco Use    Smoking status: Never Smoker    Smokeless tobacco: Never Used   Substance and Sexual Activity    Alcohol use: Yes     Frequency: Monthly or less     Comment: social    Drug use: No    Sexual activity: Not on file   Lifestyle    Physical activity:     Days per week: Not on file     Minutes per session: Not on file    Stress: Not on file   Relationships    Social connections:     Talks on phone: Not on file     Gets together: Not on file     Attends Anabaptism service: Not on file     Active member of club or organization: Not on file     Attends meetings of clubs or organizations: Not on file     Relationship status: Not on file    Intimate partner violence:     Fear of current or ex partner: Not on file     Emotionally abused: Not on file     Physically abused: Not on file     Forced sexual activity: Not on file   Other Topics Concern    Not on file   Social History Narrative    Not on file       Current Outpatient Medications:     fluticasone (FLONASE) 50 mcg/act nasal spray, 1 spray into each nostril daily, Disp: 16 g, Rfl: 0    omeprazole (PriLOSEC) 20 mg delayed release capsule, Take 1 capsule (20 mg total) by mouth daily for 60 days, Disp: 60 capsule, Rfl: 0    Review of Systems   Constitutional: Negative  HENT: Positive for congestion  Eyes: Negative  Respiratory: Negative  Cardiovascular: Positive for palpitations  Negative for chest pain and leg swelling  Neurological: Negative  Objective:    Vitals:    02/20/19 1024   BP: 122/76   BP Location: Left arm   Patient Position: Sitting   Cuff Size: Standard   Pulse: 76   Resp: 16   Weight: 108 kg (238 lb 11 oz)   Height: 5' 7 75" (1 721 m)        Physical Exam   Constitutional: She is oriented to person, place, and time  She appears well-developed and well-nourished  Cardiovascular: Normal rate, regular rhythm and normal heart sounds  Pulmonary/Chest: Effort normal and breath sounds normal    Neurological: She is alert and oriented to person, place, and time  Skin: Skin is warm  Psychiatric: She has a normal mood and affect  BMI Counseling: Body mass index is 36 56 kg/m²  The BMI is above normal  Nutrition recommendations include reducing portion sizes

## 2019-02-27 ENCOUNTER — APPOINTMENT (OUTPATIENT)
Dept: LAB | Facility: HOSPITAL | Age: 34
End: 2019-02-27
Payer: COMMERCIAL

## 2019-02-27 DIAGNOSIS — R00.2 PALPITATIONS: ICD-10-CM

## 2019-02-27 LAB — TSH SERPL DL<=0.05 MIU/L-ACNC: 1.39 UIU/ML (ref 0.36–3.74)

## 2019-02-27 PROCEDURE — 36415 COLL VENOUS BLD VENIPUNCTURE: CPT

## 2019-02-27 PROCEDURE — 84443 ASSAY THYROID STIM HORMONE: CPT

## 2019-03-08 ENCOUNTER — HOSPITAL ENCOUNTER (OUTPATIENT)
Dept: NON INVASIVE DIAGNOSTICS | Facility: HOSPITAL | Age: 34
Discharge: HOME | End: 2019-03-08
Payer: COMMERCIAL

## 2019-03-08 ENCOUNTER — TRANSCRIBE ORDERS (OUTPATIENT)
Dept: RADIOLOGY | Facility: HOSPITAL | Age: 34
End: 2019-03-08

## 2019-03-08 DIAGNOSIS — R00.2 PALPITATIONS: ICD-10-CM

## 2019-03-08 PROCEDURE — 93306 TTE W/DOPPLER COMPLETE: CPT | Performed by: INTERNAL MEDICINE

## 2019-03-08 PROCEDURE — 93306 TTE W/DOPPLER COMPLETE: CPT

## 2019-03-12 ENCOUNTER — TELEPHONE (OUTPATIENT)
Dept: FAMILY MEDICINE CLINIC | Facility: CLINIC | Age: 34
End: 2019-03-12

## 2019-03-12 NOTE — TELEPHONE ENCOUNTER
----- Message from Jil Delvalle PA-C sent at 3/11/2019  9:03 PM EDT -----  Please let patient know her echo was normal  Thank you

## 2019-03-18 ENCOUNTER — HOSPITAL ENCOUNTER (OUTPATIENT)
Dept: NON INVASIVE DIAGNOSTICS | Facility: CLINIC | Age: 34
Discharge: HOME | End: 2019-03-18
Payer: COMMERCIAL

## 2019-03-18 DIAGNOSIS — R00.2 PALPITATIONS: ICD-10-CM

## 2019-03-18 PROBLEM — J32.0 CHRONIC MAXILLARY SINUSITIS: Status: ACTIVE | Noted: 2019-03-18

## 2019-03-18 PROBLEM — J34.89 SINUS PRESSURE: Status: ACTIVE | Noted: 2019-03-18

## 2019-03-18 PROBLEM — H61.22 CERUMEN DEBRIS ON TYMPANIC MEMBRANE OF LEFT EAR: Status: ACTIVE | Noted: 2019-03-18

## 2019-03-18 PROCEDURE — 93226 XTRNL ECG REC<48 HR SCAN A/R: CPT

## 2019-03-18 PROCEDURE — 93225 XTRNL ECG REC<48 HRS REC: CPT

## 2019-03-19 ENCOUNTER — APPOINTMENT (OUTPATIENT)
Dept: LAB | Facility: HOSPITAL | Age: 34
End: 2019-03-19
Payer: COMMERCIAL

## 2019-03-19 DIAGNOSIS — J34.89 SINUS PRESSURE: ICD-10-CM

## 2019-03-19 DIAGNOSIS — J32.0 CHRONIC MAXILLARY SINUSITIS: ICD-10-CM

## 2019-03-19 PROCEDURE — 86003 ALLG SPEC IGE CRUDE XTRC EA: CPT

## 2019-03-19 PROCEDURE — 36415 COLL VENOUS BLD VENIPUNCTURE: CPT

## 2019-03-19 PROCEDURE — 82785 ASSAY OF IGE: CPT

## 2019-03-22 LAB
A ALTERNATA IGE QN: 1.52 KUA/I
A FUMIGATUS IGE QN: 0.11 KUA/I
ALLERGEN COMMENT: ABNORMAL
BERMUDA GRASS IGE QN: <0.1 KUA/I
BOXELDER IGE QN: 0.11 KUA/I
C HERBARUM IGE QN: 0.37 KUA/I
CAT DANDER IGE QN: <0.1 KUA/I
CMN PIGWEED IGE QN: <0.1 KUA/I
COMMON RAGWEED IGE QN: <0.1 KUA/I
COTTONWOOD IGE QN: <0.1 KUA/I
D FARINAE IGE QN: <0.1 KUA/I
D PTERONYSS IGE QN: <0.1 KUA/I
DOG DANDER IGE QN: <0.1 KUA/I
LONDON PLANE IGE QN: <0.1 KUA/I
MOUSE URINE PROT IGE QN: <0.1 KUA/I
MT JUNIPER IGE QN: <0.1 KUA/I
MUGWORT IGE QN: <0.1 KUA/I
P NOTATUM IGE QN: <0.1 KUA/I
ROACH IGE QN: <0.1 KUA/I
SHEEP SORREL IGE QN: <0.1 KUA/I
SILVER BIRCH IGE QN: <0.1 KUA/I
TIMOTHY IGE QN: <0.1 KUA/I
TOTAL IGE SMQN RAST: 24.1 KU/L (ref 0–113)
WALNUT IGE QN: <0.1 KUA/I
WHITE ASH IGE QN: <0.1 KUA/I
WHITE ELM IGE QN: 0.44 KUA/I
WHITE MULBERRY IGE QN: <0.1 KUA/I
WHITE OAK IGE QN: <0.1 KUA/I

## 2019-03-22 PROCEDURE — 93227 XTRNL ECG REC<48 HR R&I: CPT | Performed by: INTERNAL MEDICINE

## 2019-04-04 ENCOUNTER — HOSPITAL ENCOUNTER (OUTPATIENT)
Dept: RADIOLOGY | Facility: HOSPITAL | Age: 34
Discharge: HOME | End: 2019-04-04
Payer: COMMERCIAL

## 2019-04-04 DIAGNOSIS — J32.0 CHRONIC MAXILLARY SINUSITIS: ICD-10-CM

## 2019-04-04 DIAGNOSIS — J34.89 SINUS PRESSURE: ICD-10-CM

## 2019-04-04 PROCEDURE — 70486 CT MAXILLOFACIAL W/O DYE: CPT

## 2019-04-17 ENCOUNTER — OFFICE VISIT (OUTPATIENT)
Dept: URGENT CARE | Facility: CLINIC | Age: 34
End: 2019-04-17
Payer: COMMERCIAL

## 2019-04-17 VITALS
RESPIRATION RATE: 16 BRPM | DIASTOLIC BLOOD PRESSURE: 92 MMHG | WEIGHT: 250.8 LBS | OXYGEN SATURATION: 98 % | HEART RATE: 92 BPM | TEMPERATURE: 98.1 F | SYSTOLIC BLOOD PRESSURE: 142 MMHG | HEIGHT: 69 IN | BODY MASS INDEX: 37.15 KG/M2

## 2019-04-17 DIAGNOSIS — J20.9 ACUTE BRONCHITIS, UNSPECIFIED ORGANISM: Primary | ICD-10-CM

## 2019-04-17 PROCEDURE — S9088 SERVICES PROVIDED IN URGENT: HCPCS | Performed by: PHYSICIAN ASSISTANT

## 2019-04-17 PROCEDURE — 99213 OFFICE O/P EST LOW 20 MIN: CPT | Performed by: PHYSICIAN ASSISTANT

## 2019-04-17 RX ORDER — AZITHROMYCIN 250 MG/1
TABLET, FILM COATED ORAL
Qty: 6 TABLET | Refills: 0 | Status: SHIPPED | OUTPATIENT
Start: 2019-04-17 | End: 2019-04-18 | Stop reason: ALTCHOICE

## 2019-04-17 RX ORDER — BENZONATATE 100 MG/1
100 CAPSULE ORAL 3 TIMES DAILY PRN
Qty: 21 CAPSULE | Refills: 0 | Status: SHIPPED | OUTPATIENT
Start: 2019-04-17 | End: 2019-04-24

## 2019-04-18 ENCOUNTER — OFFICE VISIT (OUTPATIENT)
Dept: FAMILY MEDICINE CLINIC | Facility: CLINIC | Age: 34
End: 2019-04-18
Payer: COMMERCIAL

## 2019-04-18 VITALS
TEMPERATURE: 98.2 F | BODY MASS INDEX: 37.31 KG/M2 | WEIGHT: 246.2 LBS | DIASTOLIC BLOOD PRESSURE: 80 MMHG | HEART RATE: 74 BPM | HEIGHT: 68 IN | RESPIRATION RATE: 15 BRPM | SYSTOLIC BLOOD PRESSURE: 122 MMHG

## 2019-04-18 DIAGNOSIS — J30.1 SEASONAL ALLERGIC RHINITIS DUE TO POLLEN: Primary | ICD-10-CM

## 2019-04-18 DIAGNOSIS — J02.9 SORE THROAT: ICD-10-CM

## 2019-04-18 PROBLEM — J32.0 CHRONIC MAXILLARY SINUSITIS: Status: RESOLVED | Noted: 2019-03-18 | Resolved: 2019-04-18

## 2019-04-18 PROBLEM — J30.89 NON-SEASONAL ALLERGIC RHINITIS: Status: RESOLVED | Noted: 2019-02-20 | Resolved: 2019-04-18

## 2019-04-18 PROBLEM — J34.89 SINUS PRESSURE: Status: RESOLVED | Noted: 2019-03-18 | Resolved: 2019-04-18

## 2019-04-18 PROBLEM — H61.22 CERUMEN DEBRIS ON TYMPANIC MEMBRANE OF LEFT EAR: Status: RESOLVED | Noted: 2019-03-18 | Resolved: 2019-04-18

## 2019-04-18 LAB — S PYO AG THROAT QL: NEGATIVE

## 2019-04-18 PROCEDURE — 87147 CULTURE TYPE IMMUNOLOGIC: CPT | Performed by: FAMILY MEDICINE

## 2019-04-18 PROCEDURE — 99214 OFFICE O/P EST MOD 30 MIN: CPT | Performed by: FAMILY MEDICINE

## 2019-04-18 PROCEDURE — 87880 STREP A ASSAY W/OPTIC: CPT | Performed by: FAMILY MEDICINE

## 2019-04-18 PROCEDURE — 3008F BODY MASS INDEX DOCD: CPT | Performed by: FAMILY MEDICINE

## 2019-04-18 PROCEDURE — 87070 CULTURE OTHR SPECIMN AEROBIC: CPT | Performed by: FAMILY MEDICINE

## 2019-04-18 RX ORDER — MOMETASONE FUROATE 50 UG/1
2 SPRAY, METERED NASAL DAILY
Qty: 17 G | Refills: 5 | Status: SHIPPED | OUTPATIENT
Start: 2019-04-18

## 2019-04-18 RX ORDER — FEXOFENADINE HCL 180 MG/1
180 TABLET ORAL DAILY
Refills: 0
Start: 2019-04-18

## 2019-04-18 RX ORDER — PREDNISONE 10 MG/1
TABLET ORAL
Qty: 20 TABLET | Refills: 0 | Status: SHIPPED | OUTPATIENT
Start: 2019-04-18 | End: 2019-09-27

## 2019-04-20 LAB — BACTERIA THROAT CULT: ABNORMAL

## 2019-04-23 ENCOUNTER — TELEPHONE (OUTPATIENT)
Dept: FAMILY MEDICINE CLINIC | Facility: CLINIC | Age: 34
End: 2019-04-23

## 2019-04-23 DIAGNOSIS — J02.0 STREP PHARYNGITIS: Primary | ICD-10-CM

## 2019-04-23 RX ORDER — AMOXICILLIN 875 MG/1
875 TABLET, COATED ORAL 2 TIMES DAILY
Qty: 20 TABLET | Refills: 0 | Status: SHIPPED | OUTPATIENT
Start: 2019-04-23 | End: 2019-05-03

## 2019-05-23 PROBLEM — J32.9 SINUSITIS: Status: ACTIVE | Noted: 2019-05-23

## 2019-09-26 ENCOUNTER — OFFICE VISIT (OUTPATIENT)
Dept: OBGYN CLINIC | Facility: MEDICAL CENTER | Age: 34
End: 2019-09-26
Payer: COMMERCIAL

## 2019-09-26 ENCOUNTER — TELEPHONE (OUTPATIENT)
Dept: OBGYN CLINIC | Facility: MEDICAL CENTER | Age: 34
End: 2019-09-26

## 2019-09-26 VITALS
SYSTOLIC BLOOD PRESSURE: 134 MMHG | BODY MASS INDEX: 34.07 KG/M2 | DIASTOLIC BLOOD PRESSURE: 96 MMHG | HEART RATE: 86 BPM | HEIGHT: 69 IN | WEIGHT: 230 LBS

## 2019-09-26 DIAGNOSIS — S86.001A ACHILLES TENDON INJURY, RIGHT, INITIAL ENCOUNTER: Primary | ICD-10-CM

## 2019-09-26 PROCEDURE — 99204 OFFICE O/P NEW MOD 45 MIN: CPT | Performed by: ORTHOPAEDIC SURGERY

## 2019-09-26 RX ORDER — CEFAZOLIN SODIUM 1 G/50ML
1000 SOLUTION INTRAVENOUS ONCE
Status: CANCELLED | OUTPATIENT
Start: 2019-09-30 | End: 2019-09-26

## 2019-09-26 NOTE — H&P (VIEW-ONLY)
Orthopaedic Surgery - Office Note  Elder Thompson (30 y o  female)   : 1985   MRN: [id number]  Encounter Date: 2019    Chief Complaint   Patient presents with    Right Lower Leg - Pain       Assessment / Plan  Right Achilles tendon tear    · The diagnosis and treatment options were reviewed  · The patient wishes to proceed with Right Achilles tendon repair  · The risks, benefits, and alternatives were discussed  · Written consent was obtained  Return for F/U 2-3 weeks postop  History of Present Illness  Elder Thompson is a 30 y o  female who presents with right ankle pain and weakness after injuring it yesterday playing basketball  She was running and felt a pop  She had immediate posterior leg and ankle pain  She has been able to walk, but with difficulty  She denies previous ankle injuries  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 running  Review of Systems  Pertinent items are noted in HPI  All other systems were reviewed and are negative  Physical Exam  /96   Pulse 86   Ht 5' 9" (1 753 m)   Wt 104 kg (230 lb)   BMI 33 97 kg/m²   Cons: Appears well  No apparent distress  Psych: Alert  Oriented x3  Mood and affect normal   Eyes: PERRLA, EOMI  Resp: Normal effort  No audible wheezing or stridor  CV: Palpable pulse  No discernable arrhythmia  No LE edema  Lymph:  No palpable cervical, axillary, or inguinal lymphadenopathy  Skin: Warm  No palpable masses  No visible lesions  Neuro: Normal muscle tone  Normal and symmetric DTR's  Right Foot & Ankle Exam  Alignment:  Normal ankle alignment  Foot plantigrade  Inspection:  moderate ankle swelling  palpable Achilles tendon deformity  Palpation:  moderate tenderness at Achilles tendon  ROM:  Ankle DF 10, limited by pain  Ankle PF 40  Strength:  Anterior tibialis 5/5  Gastroc/Soleus 3/5  Posterior tibialis 5/5  Peroneals 5/5  EHL 5/5    Stability:  No objective ankle instablity  (-) Anterior  neutral and PF  (-) Talar Tilt  Tests:  (+) Rios test   Neurovascular:  Sensation intact in DP/SP/Crews/Sa/T nerve distributions  2+ DP & PT pulses  Gait:  Antalgic  Studies Reviewed  No studies to review    Procedures  No procedures today  Medical, Surgical, Family, and Social History  The patient's medical history, family history, and social history, were reviewed and updated as appropriate  History reviewed  No pertinent past medical history  Past Surgical History:   Procedure Laterality Date    ESOPHAGOGASTRODUODENOSCOPY N/A 8/15/2018    Procedure: ESOPHAGOGASTRODUODENOSCOPY (EGD); Surgeon: Hiral Ayala MD;  Location:  GI LAB;   Service: Gastroenterology    HAND SURGERY Right     KNEE SURGERY Left     UPPER GASTROINTESTINAL ENDOSCOPY         Family History   Problem Relation Age of Onset    Depression Mother     Hypertension Mother     Diabetes Father     Hypertension Father     Cancer Father     Heart failure Father     Leukemia Father     No Known Problems Sister     No Known Problems Brother     Diabetes Paternal Grandmother     Hypertension Paternal Grandmother     Alcohol abuse Maternal Uncle     Depression Maternal Uncle     Mental illness Maternal Uncle     Alcohol abuse Cousin     Depression Cousin     Mental illness Cousin        Social History     Occupational History    Not on file   Tobacco Use    Smoking status: Never Smoker    Smokeless tobacco: Never Used   Substance and Sexual Activity    Alcohol use: Yes     Comment: social    Drug use: No    Sexual activity: Not on file       No Known Allergies      Current Outpatient Medications:     fexofenadine (ALLEGRA) 180 MG tablet, Take 1 tablet (180 mg total) by mouth daily, Disp: , Rfl: 0    levonorgestrel (MIRENA) 20 MCG/24HR IUD, 1 Intra Uterine Device by Intrauterine route once for 1 dose, Disp: 1 each, Rfl: 0    mometasone (NASONEX) 50 mcg/act nasal spray, 2 sprays into each nostril daily, Disp: 17 g, Rfl: 5    predniSONE 10 mg tablet, Take 4 tablets at once after eating daily for 2 days then decrease by 1 pill every 2 days until prescription is gone, Disp: 20 tablet, Rfl: 0      Haresh Spears MD    Scribe Attestation    I,:    am acting as a scribe while in the presence of the attending physician :        I,:    personally performed the services described in this documentation    as scribed in my presence :

## 2019-09-26 NOTE — PROGRESS NOTES
Orthopaedic Surgery - Office Note  Vilma Peter (89 y o  female)   : 1985   MRN: [id number]  Encounter Date: 2019    Chief Complaint   Patient presents with    Right Lower Leg - Pain       Assessment / Plan  Right Achilles tendon tear    · The diagnosis and treatment options were reviewed  · The patient wishes to proceed with Right Achilles tendon repair  · The risks, benefits, and alternatives were discussed  · Written consent was obtained  Return for F/U 2-3 weeks postop  History of Present Illness  Vilma Peter is a 30 y o  female who presents with right ankle pain and weakness after injuring it yesterday playing basketball  She was running and felt a pop  She had immediate posterior leg and ankle pain  She has been able to walk, but with difficulty  She denies previous ankle injuries  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  Review of Systems  Pertinent items are noted in HPI  All other systems were reviewed and are negative  Physical Exam  /96   Pulse 86   Ht 5' 9" (1 753 m)   Wt 104 kg (230 lb)   BMI 33 97 kg/m²   Cons: Appears well  No apparent distress  Psych: Alert  Oriented x3  Mood and affect normal   Eyes: PERRLA, EOMI  Resp: Normal effort  No audible wheezing or stridor  CV: Palpable pulse  No discernable arrhythmia  No LE edema  Lymph:  No palpable cervical, axillary, or inguinal lymphadenopathy  Skin: Warm  No palpable masses  No visible lesions  Neuro: Normal muscle tone  Normal and symmetric DTR's  Right Foot & Ankle Exam  Alignment:  Normal ankle alignment  Foot plantigrade  Inspection:  moderate ankle swelling  palpable Achilles tendon deformity  Palpation:  moderate tenderness at Achilles tendon  ROM:  Ankle DF 10, limited by pain  Ankle PF 40  Strength:  Anterior tibialis 5/5  Gastroc/Soleus 3/5  Posterior tibialis 5/5  Peroneals 5/5  EHL 5/5    Stability:  No objective ankle instablity  (-) Anterior  neutral and PF  (-) Talar Tilt  Tests:  (+) Rios test   Neurovascular:  Sensation intact in DP/SP/Crews/Sa/T nerve distributions  2+ DP & PT pulses  Gait:  Antalgic  Studies Reviewed  No studies to review    Procedures  No procedures today  Medical, Surgical, Family, and Social History  The patient's medical history, family history, and social history, were reviewed and updated as appropriate  History reviewed  No pertinent past medical history  Past Surgical History:   Procedure Laterality Date    ESOPHAGOGASTRODUODENOSCOPY N/A 8/15/2018    Procedure: ESOPHAGOGASTRODUODENOSCOPY (EGD); Surgeon: Amelia Cunningham MD;  Location:  GI LAB;   Service: Gastroenterology    HAND SURGERY Right     KNEE SURGERY Left     UPPER GASTROINTESTINAL ENDOSCOPY         Family History   Problem Relation Age of Onset    Depression Mother     Hypertension Mother     Diabetes Father     Hypertension Father     Cancer Father     Heart failure Father     Leukemia Father     No Known Problems Sister     No Known Problems Brother     Diabetes Paternal Grandmother     Hypertension Paternal Grandmother     Alcohol abuse Maternal Uncle     Depression Maternal Uncle     Mental illness Maternal Uncle     Alcohol abuse Cousin     Depression Cousin     Mental illness Cousin        Social History     Occupational History    Not on file   Tobacco Use    Smoking status: Never Smoker    Smokeless tobacco: Never Used   Substance and Sexual Activity    Alcohol use: Yes     Comment: social    Drug use: No    Sexual activity: Not on file       No Known Allergies      Current Outpatient Medications:     fexofenadine (ALLEGRA) 180 MG tablet, Take 1 tablet (180 mg total) by mouth daily, Disp: , Rfl: 0    levonorgestrel (MIRENA) 20 MCG/24HR IUD, 1 Intra Uterine Device by Intrauterine route once for 1 dose, Disp: 1 each, Rfl: 0    mometasone (NASONEX) 50 mcg/act nasal spray, 2 sprays into each nostril daily, Disp: 17 g, Rfl: 5    predniSONE 10 mg tablet, Take 4 tablets at once after eating daily for 2 days then decrease by 1 pill every 2 days until prescription is gone, Disp: 20 tablet, Rfl: 0      Honey Benites MD    Scribe Attestation    I,:    am acting as a scribe while in the presence of the attending physician :        I,:    personally performed the services described in this documentation    as scribed in my presence :

## 2019-09-27 ENCOUNTER — ANESTHESIA EVENT (OUTPATIENT)
Dept: PERIOP | Facility: HOSPITAL | Age: 34
End: 2019-09-27
Payer: COMMERCIAL

## 2019-09-27 DIAGNOSIS — S86.001A ACHILLES TENDON INJURY, RIGHT, INITIAL ENCOUNTER: Primary | ICD-10-CM

## 2019-09-27 RX ORDER — IBUPROFEN 800 MG/1
TABLET ORAL EVERY 6 HOURS PRN
COMMUNITY

## 2019-09-27 NOTE — PRE-PROCEDURE INSTRUCTIONS
Pre-Surgery Instructions:   Medication Instructions    fexofenadine (ALLEGRA) 180 MG tablet Instructed patient per Anesthesia Guidelines   ibuprofen (MOTRIN) 800 mg tablet Instructed patient per Anesthesia Guidelines   levonorgestrel (MIRENA) 20 MCG/24HR IUD Instructed patient per Anesthesia Guidelines   mometasone (NASONEX) 50 mcg/act nasal spray Instructed patient per Anesthesia Guidelines  Pt instructed to not take aspirin, NSAIDS, vitamins and supplements from now until the day of surgery  Pt is not to take any medications the am of surgery

## 2019-09-30 ENCOUNTER — ANESTHESIA (OUTPATIENT)
Dept: PERIOP | Facility: HOSPITAL | Age: 34
End: 2019-09-30
Payer: COMMERCIAL

## 2019-09-30 ENCOUNTER — HOSPITAL ENCOUNTER (OUTPATIENT)
Facility: HOSPITAL | Age: 34
Setting detail: OUTPATIENT SURGERY
Discharge: HOME | End: 2019-09-30
Attending: ORTHOPAEDIC SURGERY | Admitting: ORTHOPAEDIC SURGERY
Payer: COMMERCIAL

## 2019-09-30 VITALS
HEIGHT: 69 IN | BODY MASS INDEX: 34.68 KG/M2 | WEIGHT: 234.13 LBS | OXYGEN SATURATION: 96 % | SYSTOLIC BLOOD PRESSURE: 129 MMHG | DIASTOLIC BLOOD PRESSURE: 70 MMHG | HEART RATE: 86 BPM | RESPIRATION RATE: 16 BRPM | TEMPERATURE: 98.4 F

## 2019-09-30 DIAGNOSIS — S86.001A ACHILLES TENDON INJURY, RIGHT, INITIAL ENCOUNTER: Primary | ICD-10-CM

## 2019-09-30 LAB
EXT PREGNANCY TEST URINE: NEGATIVE
EXT. CONTROL: NORMAL

## 2019-09-30 PROCEDURE — 81025 URINE PREGNANCY TEST: CPT | Performed by: ANESTHESIOLOGY

## 2019-09-30 PROCEDURE — 27650 REPAIR ACHILLES TENDON: CPT | Performed by: ORTHOPAEDIC SURGERY

## 2019-09-30 RX ORDER — SODIUM CHLORIDE 9 MG/ML
125 INJECTION, SOLUTION INTRAVENOUS CONTINUOUS
Status: DISCONTINUED | OUTPATIENT
Start: 2019-09-30 | End: 2019-09-30 | Stop reason: HOSPADM

## 2019-09-30 RX ORDER — OXYCODONE HYDROCHLORIDE 5 MG/1
5 TABLET ORAL EVERY 4 HOURS PRN
Qty: 30 TABLET | Refills: 0 | Status: SHIPPED | OUTPATIENT
Start: 2019-09-30 | End: 2019-10-02 | Stop reason: SDUPTHER

## 2019-09-30 RX ORDER — OXYCODONE HYDROCHLORIDE 5 MG/1
10 TABLET ORAL EVERY 4 HOURS PRN
Status: DISCONTINUED | OUTPATIENT
Start: 2019-09-30 | End: 2019-09-30 | Stop reason: HOSPADM

## 2019-09-30 RX ORDER — HYDROMORPHONE HCL/PF 1 MG/ML
SYRINGE (ML) INJECTION AS NEEDED
Status: DISCONTINUED | OUTPATIENT
Start: 2019-09-30 | End: 2019-09-30 | Stop reason: SURG

## 2019-09-30 RX ORDER — FENTANYL CITRATE 50 UG/ML
INJECTION, SOLUTION INTRAMUSCULAR; INTRAVENOUS AS NEEDED
Status: DISCONTINUED | OUTPATIENT
Start: 2019-09-30 | End: 2019-09-30 | Stop reason: SURG

## 2019-09-30 RX ORDER — NEOSTIGMINE METHYLSULFATE 1 MG/ML
INJECTION INTRAVENOUS AS NEEDED
Status: DISCONTINUED | OUTPATIENT
Start: 2019-09-30 | End: 2019-09-30 | Stop reason: SURG

## 2019-09-30 RX ORDER — MIDAZOLAM HYDROCHLORIDE 1 MG/ML
INJECTION INTRAMUSCULAR; INTRAVENOUS AS NEEDED
Status: DISCONTINUED | OUTPATIENT
Start: 2019-09-30 | End: 2019-09-30 | Stop reason: SURG

## 2019-09-30 RX ORDER — CEFAZOLIN SODIUM 1 G/50ML
1000 SOLUTION INTRAVENOUS ONCE
Status: DISCONTINUED | OUTPATIENT
Start: 2019-09-30 | End: 2019-09-30 | Stop reason: DRUGHIGH

## 2019-09-30 RX ORDER — ROPIVACAINE HYDROCHLORIDE 5 MG/ML
INJECTION, SOLUTION EPIDURAL; INFILTRATION; PERINEURAL AS NEEDED
Status: DISCONTINUED | OUTPATIENT
Start: 2019-09-30 | End: 2019-09-30 | Stop reason: SURG

## 2019-09-30 RX ORDER — ONDANSETRON 2 MG/ML
4 INJECTION INTRAMUSCULAR; INTRAVENOUS ONCE AS NEEDED
Status: DISCONTINUED | OUTPATIENT
Start: 2019-09-30 | End: 2019-09-30 | Stop reason: HOSPADM

## 2019-09-30 RX ORDER — DEXAMETHASONE SODIUM PHOSPHATE 10 MG/ML
INJECTION, SOLUTION INTRAMUSCULAR; INTRAVENOUS AS NEEDED
Status: DISCONTINUED | OUTPATIENT
Start: 2019-09-30 | End: 2019-09-30 | Stop reason: SURG

## 2019-09-30 RX ORDER — GLYCOPYRROLATE 0.2 MG/ML
INJECTION INTRAMUSCULAR; INTRAVENOUS AS NEEDED
Status: DISCONTINUED | OUTPATIENT
Start: 2019-09-30 | End: 2019-09-30 | Stop reason: SURG

## 2019-09-30 RX ORDER — CEFAZOLIN SODIUM 2 G/50ML
2000 SOLUTION INTRAVENOUS ONCE
Status: DISCONTINUED | OUTPATIENT
Start: 2019-09-30 | End: 2019-09-30 | Stop reason: HOSPADM

## 2019-09-30 RX ORDER — MAGNESIUM HYDROXIDE 1200 MG/15ML
LIQUID ORAL AS NEEDED
Status: DISCONTINUED | OUTPATIENT
Start: 2019-09-30 | End: 2019-09-30 | Stop reason: HOSPADM

## 2019-09-30 RX ORDER — ROPIVACAINE HYDROCHLORIDE 5 MG/ML
INJECTION, SOLUTION EPIDURAL; INFILTRATION; PERINEURAL
Status: COMPLETED | OUTPATIENT
Start: 2019-09-30 | End: 2019-09-30

## 2019-09-30 RX ORDER — MEPERIDINE HYDROCHLORIDE 50 MG/ML
12.5 INJECTION INTRAMUSCULAR; INTRAVENOUS; SUBCUTANEOUS ONCE AS NEEDED
Status: DISCONTINUED | OUTPATIENT
Start: 2019-09-30 | End: 2019-09-30 | Stop reason: HOSPADM

## 2019-09-30 RX ORDER — LIDOCAINE HYDROCHLORIDE 10 MG/ML
INJECTION, SOLUTION INFILTRATION; PERINEURAL AS NEEDED
Status: DISCONTINUED | OUTPATIENT
Start: 2019-09-30 | End: 2019-09-30 | Stop reason: SURG

## 2019-09-30 RX ORDER — ONDANSETRON 4 MG/1
4 TABLET, ORALLY DISINTEGRATING ORAL EVERY 8 HOURS PRN
Qty: 15 TABLET | Refills: 0 | Status: SHIPPED | OUTPATIENT
Start: 2019-09-30 | End: 2020-05-29 | Stop reason: ALTCHOICE

## 2019-09-30 RX ORDER — FENTANYL CITRATE/PF 50 MCG/ML
50 SYRINGE (ML) INJECTION
Status: DISCONTINUED | OUTPATIENT
Start: 2019-09-30 | End: 2019-09-30 | Stop reason: HOSPADM

## 2019-09-30 RX ORDER — OXYCODONE HYDROCHLORIDE 5 MG/1
5 TABLET ORAL EVERY 4 HOURS PRN
Status: DISCONTINUED | OUTPATIENT
Start: 2019-09-30 | End: 2019-09-30 | Stop reason: HOSPADM

## 2019-09-30 RX ORDER — HYDROMORPHONE HCL/PF 1 MG/ML
0.5 SYRINGE (ML) INJECTION
Status: COMPLETED | OUTPATIENT
Start: 2019-09-30 | End: 2019-09-30

## 2019-09-30 RX ORDER — PROPOFOL 10 MG/ML
INJECTION, EMULSION INTRAVENOUS AS NEEDED
Status: DISCONTINUED | OUTPATIENT
Start: 2019-09-30 | End: 2019-09-30 | Stop reason: SURG

## 2019-09-30 RX ORDER — ROCURONIUM BROMIDE 10 MG/ML
INJECTION, SOLUTION INTRAVENOUS AS NEEDED
Status: DISCONTINUED | OUTPATIENT
Start: 2019-09-30 | End: 2019-09-30 | Stop reason: SURG

## 2019-09-30 RX ORDER — ASPIRIN 325 MG
325 TABLET, DELAYED RELEASE (ENTERIC COATED) ORAL 2 TIMES DAILY
Qty: 28 TABLET | Refills: 0 | Status: SHIPPED | OUTPATIENT
Start: 2019-09-30 | End: 2020-12-08 | Stop reason: ALTCHOICE

## 2019-09-30 RX ORDER — ONDANSETRON 2 MG/ML
INJECTION INTRAMUSCULAR; INTRAVENOUS AS NEEDED
Status: DISCONTINUED | OUTPATIENT
Start: 2019-09-30 | End: 2019-09-30 | Stop reason: SURG

## 2019-09-30 RX ADMIN — SODIUM CHLORIDE: 0.9 INJECTION, SOLUTION INTRAVENOUS at 07:49

## 2019-09-30 RX ADMIN — ONDANSETRON 4 MG: 2 INJECTION INTRAMUSCULAR; INTRAVENOUS at 07:47

## 2019-09-30 RX ADMIN — HYDROMORPHONE HYDROCHLORIDE 0.5 MG: 1 INJECTION, SOLUTION INTRAMUSCULAR; INTRAVENOUS; SUBCUTANEOUS at 10:34

## 2019-09-30 RX ADMIN — ROCURONIUM BROMIDE 35 MG: 10 INJECTION, SOLUTION INTRAVENOUS at 07:33

## 2019-09-30 RX ADMIN — PROPOFOL 200 MG: 10 INJECTION, EMULSION INTRAVENOUS at 07:33

## 2019-09-30 RX ADMIN — SODIUM CHLORIDE 125 ML/HR: 0.9 INJECTION, SOLUTION INTRAVENOUS at 06:12

## 2019-09-30 RX ADMIN — DEXAMETHASONE SODIUM PHOSPHATE 4 MG: 10 INJECTION, SOLUTION INTRAMUSCULAR; INTRAVENOUS at 07:47

## 2019-09-30 RX ADMIN — DEXAMETHASONE SODIUM PHOSPHATE 4 MG: 10 INJECTION, SOLUTION INTRAMUSCULAR; INTRAVENOUS at 09:32

## 2019-09-30 RX ADMIN — FENTANYL CITRATE 50 MCG: 50 INJECTION INTRAMUSCULAR; INTRAVENOUS at 09:40

## 2019-09-30 RX ADMIN — FENTANYL CITRATE 100 MCG: 50 INJECTION, SOLUTION INTRAMUSCULAR; INTRAVENOUS at 07:01

## 2019-09-30 RX ADMIN — MIDAZOLAM 4 MG: 1 INJECTION INTRAMUSCULAR; INTRAVENOUS at 07:01

## 2019-09-30 RX ADMIN — ROPIVACAINE HYDROCHLORIDE 30 ML: 5 INJECTION, SOLUTION EPIDURAL; INFILTRATION; PERINEURAL at 07:54

## 2019-09-30 RX ADMIN — OXYCODONE HYDROCHLORIDE 10 MG: 5 TABLET ORAL at 11:57

## 2019-09-30 RX ADMIN — HYDROMORPHONE HYDROCHLORIDE 0.5 MG: 1 INJECTION, SOLUTION INTRAMUSCULAR; INTRAVENOUS; SUBCUTANEOUS at 10:28

## 2019-09-30 RX ADMIN — LIDOCAINE HYDROCHLORIDE 50 MG: 10 INJECTION, SOLUTION INFILTRATION; PERINEURAL at 07:33

## 2019-09-30 RX ADMIN — HYDROMORPHONE HYDROCHLORIDE 0.5 MG: 1 INJECTION, SOLUTION INTRAMUSCULAR; INTRAVENOUS; SUBCUTANEOUS at 09:53

## 2019-09-30 RX ADMIN — HYDROMORPHONE HYDROCHLORIDE 0.5 MG: 1 INJECTION, SOLUTION INTRAMUSCULAR; INTRAVENOUS; SUBCUTANEOUS at 10:10

## 2019-09-30 RX ADMIN — ROPIVACAINE HYDROCHLORIDE 30 ML: 5 INJECTION, SOLUTION EPIDURAL; INFILTRATION; PERINEURAL at 07:08

## 2019-09-30 RX ADMIN — NEOSTIGMINE METHYLSULFATE 3 MG: 1 INJECTION, SOLUTION INTRAVENOUS at 08:59

## 2019-09-30 RX ADMIN — GLYCOPYRROLATE 0.4 MG: 0.2 INJECTION INTRAMUSCULAR; INTRAVENOUS at 08:59

## 2019-09-30 RX ADMIN — ONDANSETRON 4 MG: 2 INJECTION INTRAMUSCULAR; INTRAVENOUS at 09:32

## 2019-09-30 RX ADMIN — FENTANYL CITRATE 50 MCG: 50 INJECTION INTRAMUSCULAR; INTRAVENOUS at 09:45

## 2019-09-30 RX ADMIN — HYDROMORPHONE HYDROCHLORIDE 0.5 MG: 1 INJECTION, SOLUTION INTRAMUSCULAR; INTRAVENOUS; SUBCUTANEOUS at 07:55

## 2019-09-30 NOTE — ANESTHESIA PREPROCEDURE EVALUATION
Review of Systems/Medical History  Patient summary reviewed  Chart reviewed  No history of anesthetic complications     Cardiovascular  Negative cardio ROS    Pulmonary  Negative pulmonary ROS        GI/Hepatic  Negative GI/hepatic ROS     Comment: Intractable vomiting with nausea     Negative  ROS        Endo/Other    Obesity (BMI 33)    GYN  Negative gynecology ROS          Hematology  Negative hematology ROS      Musculoskeletal  Negative musculoskeletal ROS        Neurology  Negative neurology ROS      Psychology   Depression ,              Physical Exam    Airway    Mallampati score: II  TM Distance: >3 FB  Neck ROM: full     Dental   No notable dental hx     Cardiovascular  Comment: Negative ROS, Rhythm: regular, Rate: normal, Cardiovascular exam normal    Pulmonary  Pulmonary exam normal Breath sounds clear to auscultation,     Other Findings        Anesthesia Plan  ASA Score- 2     Anesthesia Type- general and regional with ASA Monitors  Additional Monitors:   Airway Plan: ETT  Plan Factors-Patient not instructed to abstain from smoking on day of procedure  Patient did not smoke on day of surgery  Induction- intravenous  Postoperative Plan-     Informed Consent- Anesthetic plan and risks discussed with patient

## 2019-09-30 NOTE — INTERVAL H&P NOTE
H&P reviewed  After examining the patient I find no changes in the patients condition since the H&P had been written      Vitals:    09/30/19 0715   BP: 122/71   Pulse: (!) 54   Resp: 16   Temp:    SpO2: 100%

## 2019-09-30 NOTE — OP NOTE
OPERATIVE REPORT    PATIENT NAME: Chelle Tim   :  1985  MRN: [id number]  Pt Location: [facility] OR ROOM 02    SURGERY DATE: 2019    SURGEON(S) and ROLE:  Primary: Isela Patel MD  Assisting: Suly Brush DPM; Anil Loera PA-C; Kassandra Barker DO    NOTE:  The presence of a physician assistant was necessary to help with patient positioning, surgical exposure, wound retraction, wound closure, and other key portions of the procedure  No qualified resident was available for this case  PREOPERATIVE DIAGNOSES:  Right Achilles Tendon Rupture    POSTOPERATIVE DIAGNOSES:  Right Achilles Tendon Rupture    PROCEDURES:  Right Achilles Tendon Repair      ANESTHESIA TYPE:  General endotracheal and Popliteal block    ANESTHESIA STAFF:   Anesthesiologist: Laurel Arevalo DO  CRNA: Amadeo Herrera CRNA; Felisa Alba CRNA    ESTIMATED BLOOD LOSS:  10 mL    TOURNIQUET TIME:  na    PERIOPERATIVE ANTIBIOTICS:  cefazolin, 2 grams    IMPLANTS:  Arthrex FiberWire (x4)    * No implants in log *    SPECIMENS:  * No specimens in log *    DRAINS:  None      OPERATIVE INDICATIONS:  The patient is a 30 y o  female with right ankle pain and an Achilles tendon tear  Surgical treatment was indicated due to patient's functional demands and high liklihood of recurrent tear with nonsurgical treatment  After a thorough discussion of the potential risks, benefits, and alternative treatments, the patient agreed to proceed with surgery  The patient understands that the risks of surgery include, but are not limited to: failure of repair, infection, neurovascular injury, wound healing complications, venous thromboembolism, persistent pain, stiffness, instability, recurrence of symptoms, potential need for additional surgeries, and loss of limb or life  Oral and written consent for surgery was obtained from the patient preoperatively      PROCEDURE AND TECHNIQUE:  On the day of surgery, the patient was identified in the preoperative holding area  The operative site was marked by the surgeon  The patient was taken into the operating room  A time-out was conducted to confirm the patient's identity, the operative site, and the proposed procedure  The patient was anesthetized, and perioperative antibiotics were administered  The patient was positioned supine on the OR table  All bony prominences were padded  A tourniquet was not used  The operative site was prepped and draped using standard sterile technique  A posteromedial incision was made along the Achilles tendon centered over the palpable defect  Full-thickness skin flaps were developed  The Achilles paratenon was sharply divided  The tendon was found to be completely ruptured  The tendon was sutured with two #2 FiberWires in the proximal stump and two #2 FiberWires in the distal stump in modified [place] fashion  The sutures were tied with the ankle in maximal plantarflexion to provide a four-strand FiberWire repair  The ankle was able to be placed in neutral plantarflexion without undue tension on the repair  The wound was irrigated with sterile saline  A 0 vicryl suture was used to secure the loose tendon fibers at the repair site  A 0 vicryl was used to repair the paratenon  The skin was closed with 0-vicryl, 2-0 vicryl, and 2-0 Stratafix  Steri-strips were applied  A sterile dressing was applied  THe patient was given a short leg splint in slight plantarflexion  The patient was awakened and taken to recovery        COMPLICATIONS:  None    PATIENT DISPOSITION:  PACU       SIGNATURE:  Shanita Scanlon MD  DATE:  September 30, 2019  TIME:  10:48 AM

## 2019-09-30 NOTE — ANESTHESIA PROCEDURE NOTES
Peripheral Block    Patient location during procedure: holding area  Start time: 9/30/2019 7:01 AM  Reason for block: at surgeon's request and post-op pain management  Staffing  Anesthesiologist: June Lance DO  Performed: anesthesiologist   Preanesthetic Checklist  Completed: patient identified, site marked, surgical consent, pre-op evaluation, timeout performed, IV checked, risks and benefits discussed and monitors and equipment checked  Peripheral Block  Patient position: left lateral decubitus  Prep: ChloraPrep  Patient monitoring: continuous pulse ox and frequent blood pressure checks  Block type: popliteal  Laterality: right  Injection technique: single-shot  Procedures: ultrasound guided, Ultrasound guidance required for the procedure to increase accuracy and safety of medication placement and decrease risk of complications   and nerve stimulator  Ultrasound permanent image savedropivacaine (NAROPIN) 0 5 % perineural infiltration, 30 mL  Needle  Needle type: Stimuplex   Needle gauge: 27 G  Needle length: 10 cm  Needle localization: anatomical landmarks, nerve stimulator and ultrasound guidance  Test dose: negative  Assessment  Injection assessment: incremental injection, local visualized surrounding nerve on ultrasound and negative aspiration for heme  Paresthesia pain: none  Heart rate change: no  Slow fractionated injection: yes  Post-procedure:  site cleaned  patient tolerated the procedure well with no immediate complications

## 2019-09-30 NOTE — DISCHARGE INSTRUCTIONS
POSTOPERATIVE INSTRUCTIONS    MEDICATIONS:  · Resume all home medications unless otherwise instructed by your surgeon  · Pain Medication:  Oxycodone 5 mg, 1-3 tablets every 3 hours as needed  · If you were given a regional anesthetic (nerve block), please begin taking the pain medication as soon as you get home, even if you have minimal or no pain  DO NOT WAIT FOR THE NERVE BLOCK TO WEAR OFF  · Possible side effects include nausea, constipation, and urinary retention  If you experience these side effects, please call our office for assistance  · Pain med refills are authorized only during office hours (8am-4pm, Mon-Fri)  · Anti-Inflammatory:  Resume your home anti-inflammatory medication  · Take with food  Stop if you experience nausea, reflux, or stomach pain  · Nausea Medication:  Zofran ODT 4 mg, 1 tablet every 6 hours as needed for nausea  · Fill prescription ONLY if you expericnce severe nausea  · Blood Clot Prevention:  Aspirin 325 mg, 1 tablet twice daily for 2 weeks  · Pump your foot up and down 20 times per hour while you are less mobile  WOUND CARE:  · Keep the dressing clean and dry  Light drainage may occur the first 2 days postop  · DO NOT REMOVE THE DRESSINGS until you are seen in our office  Cover the bandages appropriately while washing to keep them from getting wet  · Please call our office ([phone]) if you experience any of the following:  · Sudden increase in swelling, redness, or warmth at the surgical site  · Excessive incisional drainage that persists beyond the 3rd day after surgery  · Oral temperature greater than 101 degrees, not relieved with Tylenol  · Shortness of breath, chest pain, nausea, or any other concerning symptoms    SWELLING CONTROL:  · Cold Therapy:  Apply ice (20 min on, 20 min off) as often as you feel is necessary  · Elevation:  Elevate the entire leg above heart level as much as possible    · Compression:  Apply ACE wraps or a compression stocking as needed  RANGE OF MOTION:  · You are NOT ALLOWED RANGE OF MOTION until you are given permission from your surgeon  IMMOBILIZATION:  · DO NOT REMOVE YOUR SPLINT  Keep it clean and dry  ACTIVITY:  · DO NOT BEAR WEIGHT on the operative leg  Always use crutches  · Using Crutches on Stairs:  Going up, lead with your "good" (nonoperative) leg  Going down, lead with your "bad" (operative) leg  Use a hand rail when available  · Quad Sets:  Sit or lie with your knee straight  Tighten your quadriceps (front thigh) muscle  Hold for 3 seconds, then relax  Repeat 20 times per hour while awake  PHYSICAL THERAPY:  · You will be given a physical therapy prescription when you are seen in the office for your postoperative appointment  FOLLOW-UP APPOINTMENT:  · 2 weeks after surgery with:    Dr Prudence Jung, [address] [facility]  [address] [place] [address], [address] [place] [address], [address] [facility]  [phone] ([facility] [address])  [phone] (After Hours)

## 2019-10-02 ENCOUNTER — TELEPHONE (OUTPATIENT)
Dept: OBGYN CLINIC | Facility: HOSPITAL | Age: 34
End: 2019-10-02

## 2019-10-02 DIAGNOSIS — S86.001A ACHILLES TENDON INJURY, RIGHT, INITIAL ENCOUNTER: ICD-10-CM

## 2019-10-02 RX ORDER — OXYCODONE HYDROCHLORIDE 5 MG/1
10 TABLET ORAL EVERY 4 HOURS PRN
Qty: 40 TABLET | Refills: 0 | Status: SHIPPED | OUTPATIENT
Start: 2019-10-02 | End: 2019-10-04 | Stop reason: SDUPTHER

## 2019-10-02 NOTE — TELEPHONE ENCOUNTER
I discussed the pain with the patient  I also sent oxycodone refill number 40 to the pharmacy at this time  We did discuss weaning off of the oxycodone once the pain was improved and more controlled  She will continue with the other modalities and elevation at this time  She will contact us if anything changes

## 2019-10-02 NOTE — TELEPHONE ENCOUNTER
Dr Nneka Joseph patient - Achilles tendon repair 9/30    Patient and mom called to let Dr Nneka Joseph know that she is having problems with pain control  She is taking Oxycodone 5mg 2 tabs every 3 hrs, tylenol 1000 mg TID and Ibuprofen 600mg without much relief  She is icing and elevating  Cap refill is good  Outer Ace wrap does not seem too tight  She is down to her last 3 oxycodone and is requesting refill be sent to [facility] on file  I advised she could try increasing the ibuprofen to 800mg TID with food  She will call our office if pain level does not improve  I will check in with her tomorrow  Please advise

## 2019-10-03 NOTE — TELEPHONE ENCOUNTER
I spoke to patient and she states her pain is improved  She was advised to call the office should increased pain symptoms come back

## 2019-10-04 ENCOUNTER — TELEPHONE (OUTPATIENT)
Dept: OBGYN CLINIC | Facility: HOSPITAL | Age: 34
End: 2019-10-04

## 2019-10-04 DIAGNOSIS — S86.001A ACHILLES TENDON INJURY, RIGHT, INITIAL ENCOUNTER: ICD-10-CM

## 2019-10-04 RX ORDER — OXYCODONE HYDROCHLORIDE 5 MG/1
10 TABLET ORAL EVERY 4 HOURS PRN
Qty: 40 TABLET | Refills: 0 | Status: SHIPPED | OUTPATIENT
Start: 2019-10-04 | End: 2019-10-08 | Stop reason: SDUPTHER

## 2019-10-04 NOTE — TELEPHONE ENCOUNTER
Patient called in  Cb# [phone]    She asked to speak to Dr Miesha SLOAN, said she talk to him a couple of days ago  I asked her what it was in reference to and she said her surgery and level of pain  Please advise

## 2019-10-04 NOTE — TELEPHONE ENCOUNTER
I spoke with the patient and sent a refill of Oxycodone to her pharmacy she will continue to wean down at this time  She has follow up on Tuesday next week this should last until then as she has been taking up to 3 q 4 hours for pain

## 2019-10-08 ENCOUNTER — OFFICE VISIT (OUTPATIENT)
Dept: OBGYN CLINIC | Facility: MEDICAL CENTER | Age: 34
End: 2019-10-08
Payer: COMMERCIAL

## 2019-10-08 VITALS
DIASTOLIC BLOOD PRESSURE: 88 MMHG | SYSTOLIC BLOOD PRESSURE: 141 MMHG | HEIGHT: 69 IN | BODY MASS INDEX: 34.07 KG/M2 | WEIGHT: 230 LBS | HEART RATE: 73 BPM

## 2019-10-08 DIAGNOSIS — S86.012D ACHILLES TENDON TEAR, LEFT, SUBSEQUENT ENCOUNTER: Primary | ICD-10-CM

## 2019-10-08 DIAGNOSIS — S86.001A ACHILLES TENDON INJURY, RIGHT, INITIAL ENCOUNTER: ICD-10-CM

## 2019-10-08 DIAGNOSIS — Z98.890 S/P ACHILLES TENDON REPAIR: ICD-10-CM

## 2019-10-08 PROCEDURE — 99024 POSTOP FOLLOW-UP VISIT: CPT | Performed by: ORTHOPAEDIC SURGERY

## 2019-10-08 PROCEDURE — 29515 APPLICATION SHORT LEG SPLINT: CPT | Performed by: ORTHOPAEDIC SURGERY

## 2019-10-08 RX ORDER — OXYCODONE HYDROCHLORIDE 5 MG/1
10 TABLET ORAL EVERY 4 HOURS PRN
Qty: 40 TABLET | Refills: 0 | Status: SHIPPED | OUTPATIENT
Start: 2019-10-08 | End: 2019-10-18

## 2019-10-08 NOTE — LETTER
October 8, 2019     Patient: Tawana Mario   YOB: 1985   Date of Visit: 10/8/2019       To Whom it May Concern:    Tawana Mario is under my professional care  She was seen in my office on 10/8/2019  She may not return to work until her next follow up appointment on 10/16/2019  If you have any questions or concerns, please don't hesitate to call           Sincerely,          Tim Park MD        CC: No Recipients

## 2019-10-08 NOTE — PROGRESS NOTES
Orthopaedic Surgery - Office Note  Josi Cameron (66 y o  female)   : 1985   MRN: [id number]  Encounter Date: 10/8/2019    Chief Complaint   Patient presents with    Right Lower Leg - Post-op       Assessment / Plan  Right leg, status post Achilles tendon repair      · Short leg splint  · Begin outpatient PT for achilles tendon repair performed 2019  Start physical therapy 2 weeks after surgery  Patient was provided with protocol for physical therapy at today's appointment  · Tylenol prn pain  · At the next appointment I would like to transition her into a Cam boot with wedges  · Cannot return to work until after next appointment on 2019  Return in about 8 days (around 10/16/2019)  History of Present Illness  Josi Cameron is a 30 y o  female who presents for 1 week postop visit  She reports she is having mild pain that is controlled with oxycodone and 800 mg Tylenol  She presents the office today with her mother  She is nonweightbearing in a splint using a knee scooter  She has not begun physical therapy at this point  She denies any fevers or chills  She denies any further injury or trauma to her right ankle  She denies any distal paresthesias  Review of Systems  Pertinent items are noted in HPI  All other systems were reviewed and are negative  Physical Exam  /88   Pulse 73   Ht 5' 9" (1 753 m)   Wt 104 kg (230 lb)   LMP 2019 Comment: urine hcg negative   BMI 33 97 kg/m²   Cons: Appears well  No apparent distress  Psych: Alert  Oriented x3  Mood and affect normal   Eyes: PERRLA, EOMI  Resp: Normal effort  No audible wheezing or stridor  CV: Palpable pulse  No discernable arrhythmia  No LE edema  Lymph:  No palpable cervical, axillary, or inguinal lymphadenopathy  Skin: Warm  No palpable masses  No visible lesions  Neuro: Normal muscle tone  Normal and symmetric DTR's       Right Foot & Ankle Exam  Alignment:  Normal ankle alignment  Inspection:  mild swelling  No erythema  No ecchymosis  Incisions clean and dry  Palpation:  No tenderness  No effusion  No warmth  No crepitus  ROM:  -30 to 50  Strength:  Not tested  Stability:  Not tested  Tests:  No pertinent positive or negative tests  Neurovascular:  Sensation intact in DP/SP/Crews/Sa/T nerve distributions  2+ DP & PT pulses  Gait:  Nonweightbearing with knee scooter    Studies Reviewed  No studies to review    Splint application  Date/Time: 10/8/2019 12:58 PM  Performed by: Jam Ladd MD  Authorized by: Jam Ladd MD     Consent:     Consent obtained:  Verbal    Consent given by:  Patient    Risks discussed:  Discoloration, pain, swelling and numbness  Pre-procedure details:     Sensation:  Normal  Procedure details:     Laterality:  Right    Location:  Ankle    Ankle:  R ankle    Strapping: no      Splint type:  Short leg    Supplies:  Cotton padding and Ortho-Glass           Medical, Surgical, Family, and Social History  The patient's medical history, family history, and social history, were reviewed and updated as appropriate  Past Medical History:   Diagnosis Date    Chronic sinus infection     Depression     Post-op pain        Past Surgical History:   Procedure Laterality Date    ESOPHAGOGASTRODUODENOSCOPY N/A 8/15/2018    Procedure: ESOPHAGOGASTRODUODENOSCOPY (EGD); Surgeon: Gill Barry MD;  Location: [facility];   Service: Gastroenterology    HAND SURGERY Right     KNEE ARTHROSCOPY Left     lateral meniscus tear, ACL tear    KNEE SURGERY Left     [place] REPAIR ACHILLES TENDON,PRIMARY Right 9/30/2019    Procedure: REPAIR TENDON ACHILLES;  Surgeon: Jam Ladd MD;  Location: [facility];  Service: Orthopedics    UPPER GASTROINTESTINAL ENDOSCOPY         Family History   Problem Relation Age of Onset    Depression Mother     Hypertension Mother     Diabetes Father     Hypertension Father     Cancer Father     Heart failure Father     Leukemia Father     No Known Problems Sister     No Known Problems Brother     Diabetes Paternal Grandmother     Hypertension Paternal Grandmother     Alcohol abuse Maternal Uncle     Depression Maternal Uncle     Mental illness Maternal Uncle     Alcohol abuse Cousin     Depression Cousin     Mental illness Cousin        Social History     Occupational History    Not on file   Tobacco Use    Smoking status: Never Smoker    Smokeless tobacco: Never Used   Substance and Sexual Activity    Alcohol use: Yes     Comment: social    Drug use: No    Sexual activity: Not on file       No Known Allergies      Current Outpatient Medications:     aspirin (ECOTRIN) 325 mg EC tablet, Take 1 tablet (325 mg total) by mouth 2 (two) times a day for 14 days, Disp: 28 tablet, Rfl: 0    fexofenadine (ALLEGRA) 180 MG tablet, Take 1 tablet (180 mg total) by mouth daily (Patient taking differently: Take 180 mg by mouth as needed ), Disp: , Rfl: 0    ibuprofen (MOTRIN) 800 mg tablet, Take by mouth every 6 (six) hours as needed for mild pain, Disp: , Rfl:     mometasone (NASONEX) 50 mcg/act nasal spray, 2 sprays into each nostril daily (Patient taking differently: 2 sprays into each nostril as needed ), Disp: 17 g, Rfl: 5    ondansetron (ZOFRAN-ODT) 4 mg disintegrating tablet, Take 1 tablet (4 mg total) by mouth every 8 (eight) hours as needed for nausea or vomiting, Disp: 15 tablet, Rfl: 0    oxyCODONE (ROXICODONE) 5 mg immediate release tablet, Take 2 tablets (10 mg total) by mouth every 4 (four) hours as needed for moderate pain for up to 10 daysMax Daily Amount: 60 mg, Disp: 40 tablet, Rfl: 0    levonorgestrel (MIRENA) 20 MCG/24HR IUD, 1 Intra Uterine Device by Intrauterine route once for 1 dose (Patient taking differently: 1 each by Intrauterine route once ), Disp: 1 each, Rfl: 0      Heidi Del Castillo    Scribe Attestation    I,:   Jania Kaiser am acting as a scribe while in the presence of the attending physician : I,:   Ivy Chase MD personally performed the services described in this documentation    as scribed in my presence :

## 2019-10-16 ENCOUNTER — OFFICE VISIT (OUTPATIENT)
Dept: OBGYN CLINIC | Facility: OTHER | Age: 34
End: 2019-10-16

## 2019-10-16 VITALS
HEART RATE: 85 BPM | DIASTOLIC BLOOD PRESSURE: 89 MMHG | SYSTOLIC BLOOD PRESSURE: 129 MMHG | HEIGHT: 69 IN | BODY MASS INDEX: 34.07 KG/M2 | WEIGHT: 230 LBS

## 2019-10-16 DIAGNOSIS — Z98.890 S/P ACHILLES TENDON REPAIR: ICD-10-CM

## 2019-10-16 DIAGNOSIS — S86.012D ACHILLES TENDON TEAR, LEFT, SUBSEQUENT ENCOUNTER: Primary | ICD-10-CM

## 2019-10-16 PROCEDURE — 99024 POSTOP FOLLOW-UP VISIT: CPT | Performed by: ORTHOPAEDIC SURGERY

## 2019-10-16 NOTE — PROGRESS NOTES
Orthopaedic Surgery - Office Note  Shreya Gallardo (46 y o  female)   : 1985   MRN: [id number]  Encounter Date: 10/16/2019    Chief Complaint   Patient presents with    Right Lower Leg - Follow-up       Assessment / Plan  Right lower leg,  S/p achilles tendon repair performed on 2019    · Activity as tolerated  · WBAT in boot  · Wean crutches  · CAM boot with wedges, progressively decrease the use of wedges over the next 1-2 weeks  · Begin outpatient PT for Achilles tendon repair  Focus on range of motion  Patient was provided with a protocol at today's visit  · Work on range of motion with a home exercise program in addition to formal physical therapy  · She may return to work on Monday as long as she is tolerating full weight-bearing in the boot  If she is not tolerating full weight-bearing in the boot well she may call the office to extend her time off of work for an additional week  · Anti-inflammatories or Tylenol prn pain  Return in about 4 weeks (around 2019)  History of Present Illness  Shreya Gallardo is a 30 y o  female who presents 2 weeks status post right Achilles tendon repair  She reports her pain is well controlled with ibuprofen and Tylenol  She reports to the office today in a splint using a Rollator  She has been out of work since the initial injury on 2019 during [employer] game  She denies any further injury or trauma to her right leg  She denies any discrete seizures  She denies any fevers or chills  [place]   [employer]      Review of Systems  Pertinent items are noted in HPI  All other systems were reviewed and are negative  Physical Exam  /89   Pulse 85   Ht 5' 9" (1 753 m)   Wt 104 kg (230 lb)   LMP 2019 Comment: urine hcg negative   BMI 33 97 kg/m²   Cons: Appears well  No apparent distress  Psych: Alert  Oriented x3  Mood and affect normal   Eyes: PERRLA, EOMI  Resp: Normal effort    No audible wheezing or stridor  CV: Palpable pulse  No discernable arrhythmia  No LE edema  Lymph:  No palpable cervical, axillary, or inguinal lymphadenopathy  Skin: Warm  No palpable masses  No visible lesions  Neuro: Normal muscle tone  Normal and symmetric DTR's  Right Foot & Ankle Exam  Alignment:  Normal ankle alignment  Inspection:  mild swelling  No edema  No erythema  No ecchymosis  No muscle atrophy  No deformity  Incision clean and dry  Palpation:  incision tenderness  No effusion  No warmth  No crepitus  No clicking, catching, or snapping  ROM:  -30 to -50  Strength:  Not tested  Stability:  Not tested  Tests:  No pertinent positive or negative tests  Neurovascular:  Sensation intact in DP/SP/Crews/Sa/T nerve distributions  2+ DP & PT pulses  Gait:  Non weight-bearing with the use of a Rollator    Studies Reviewed  No studies to review    Procedures  No procedures today  Medical, Surgical, Family, and Social History  The patient's medical history, family history, and social history, were reviewed and updated as appropriate  Past Medical History:   Diagnosis Date    Chronic sinus infection     Depression     Post-op pain        Past Surgical History:   Procedure Laterality Date    ESOPHAGOGASTRODUODENOSCOPY N/A 8/15/2018    Procedure: ESOPHAGOGASTRODUODENOSCOPY (EGD); Surgeon: Shira Reece MD;  [facility];   Service: Gastroenterology    HAND SURGERY Right     KNEE ARTHROSCOPY Left     lateral meniscus tear, ACL tear    KNEE SURGERY Left     [place] REPAIR ACHILLES TENDON,PRIMARY Right 9/30/2019    Procedure: REPAIR TENDON ACHILLES;  Surgeon: Rhonda Pickett MD;  Location: [facility];  Service: Orthopedics    UPPER GASTROINTESTINAL ENDOSCOPY         Family History   Problem Relation Age of Onset    Depression Mother     Hypertension Mother     Diabetes Father     Hypertension Father     Cancer Father     Heart failure Father     Leukemia Father     No Known Problems Sister     No Known Problems Brother     Diabetes Paternal Grandmother     Hypertension Paternal Grandmother     Alcohol abuse Maternal Uncle     Depression Maternal Uncle     Mental illness Maternal Uncle     Alcohol abuse Cousin     Depression Cousin     Mental illness Cousin        Social History     Occupational History    Not on file   Tobacco Use    Smoking status: Never Smoker    Smokeless tobacco: Never Used   Substance and Sexual Activity    Alcohol use: Yes     Comment: social    Drug use: No    Sexual activity: Not on file       No Known Allergies      Current Outpatient Medications:     fexofenadine (ALLEGRA) 180 MG tablet, Take 1 tablet (180 mg total) by mouth daily (Patient taking differently: Take 180 mg by mouth as needed ), Disp: , Rfl: 0    ibuprofen (MOTRIN) 800 mg tablet, Take by mouth every 6 (six) hours as needed for mild pain, Disp: , Rfl:     mometasone (NASONEX) 50 mcg/act nasal spray, 2 sprays into each nostril daily (Patient taking differently: 2 sprays into each nostril as needed ), Disp: 17 g, Rfl: 5    ondansetron (ZOFRAN-ODT) 4 mg disintegrating tablet, Take 1 tablet (4 mg total) by mouth every 8 (eight) hours as needed for nausea or vomiting (Patient taking differently: Take 4 mg by mouth as needed for nausea or vomiting ), Disp: 15 tablet, Rfl: 0    oxyCODONE (ROXICODONE) 5 mg immediate release tablet, Take 2 tablets (10 mg total) by mouth every 4 (four) hours as needed for moderate pain for up to 10 daysMax Daily Amount: 60 mg, Disp: 40 tablet, Rfl: 0    aspirin (ECOTRIN) 325 mg EC tablet, Take 1 tablet (325 mg total) by mouth 2 (two) times a day for 14 days, Disp: 28 tablet, Rfl: 0    levonorgestrel (MIRENA) 20 MCG/24HR IUD, 1 Intra Uterine Device by Intrauterine route once for 1 dose (Patient taking differently: 1 each by Intrauterine route once ), Disp: 1 each, Rfl: 0      Heidi Palangio    Scribe Attestation    I,:   Heidi Winno am acting as a scribe while in the presence of the attending physician :        I,:   Dmitriy Martin MD personally performed the services described in this documentation    as scribed in my presence :

## 2019-10-16 NOTE — LETTER
October 16, 2019     Patient: Emma Archuleta   YOB: 1985   Date of Visit: 10/16/2019       To Whom it May Concern:    Emma Archuleta is under my professional care  She was seen in my office on 10/16/2019  She may return to work on 10/21/2019, with the use of CAM boot  She may need to use crutches or rollator during work       If you have any questions or concerns, please don't hesitate to call           Sincerely,          Mihir Hanson MD        CC: No Recipients

## 2019-10-17 ENCOUNTER — EVALUATION (OUTPATIENT)
Dept: PHYSICAL THERAPY | Age: 34
End: 2019-10-17
Payer: COMMERCIAL

## 2019-10-17 DIAGNOSIS — S86.001A ACHILLES TENDON INJURY, RIGHT, INITIAL ENCOUNTER: Primary | ICD-10-CM

## 2019-10-17 PROCEDURE — 97161 PT EVAL LOW COMPLEX 20 MIN: CPT | Performed by: PHYSICAL THERAPIST

## 2019-10-17 NOTE — PROGRESS NOTES
PT Evaluation     Today's date: 10/17/2019  Patient name: Gillian Valle  : 1985  MRN: [id number]  Referring provider: Nelle Bumpers, MD  Dx:   Encounter Diagnosis     ICD-10-CM    1  Achilles tendon injury, right, initial encounter S86 001A                   Assessment  Assessment details: Pt presents to PT s/p R achilles tendon repair 19  Pt is currently ambulating independently in cam boot with B crutches, while using scooter in home  Pt is WBAT, with discussion to return to work in next week if she can tolerate full WB  However, pt does not feel she will be ready and will contact ortho as needed  Pt will benefit from skilled PT in order to improve R ankle ROM and strength per protocol  Pt was educated on signs/symptoms of DVT and told to continue medication per physician's orders     Impairments: abnormal coordination, abnormal gait, abnormal muscle tone, abnormal or restricted ROM, activity intolerance, impaired balance, impaired physical strength, lacks appropriate home exercise program, pain with function, safety issue, weight-bearing intolerance and poor body mechanics    Goals  In 4 weeks pt will:  -Be independent with phase I of HEP  -Improve ankle ROM per protocol    By discharge pt will:  -Be independent with Phase II of HEP  -Have full active/passive ROM in R ankle  -Demonstrate 5/5 strength in all directions of R ankle    Plan  Patient would benefit from: skilled physical therapy  Planned modality interventions: TENS, thermotherapy: hydrocollator packs and cryotherapy  Planned therapy interventions: motor coordination training, muscle pump exercises, abdominal trunk stabilization, activity modification, balance/weight bearing training, neuromuscular re-education, patient education, strengthening, stretching, therapeutic activities, therapeutic exercise, home exercise program, graded exercise, graded activity, gait training, functional ROM exercises, compression and coordination  Frequency: 2x week  Duration in weeks: 8  Plan of Care beginning date: 10/17/2019  Plan of Care expiration date: 2019  Treatment plan discussed with: patient        Subjective Evaluation    History of Present Illness  Date of surgery: 2019  Mechanism of injury: Pt reports to PT following achilles tendon tear in basketball game in September with ensuing surgery 19  Pt presents WBAT in cam boot with 2 heel wedges with B crutches  Pt reports increased pain with ambulation and states she is able to move throughout home better with scooter     Pain  Current pain ratin  At best pain ratin  At worst pain rating: 10    Patient Goals  Patient goals for therapy: decreased pain, increased strength, independence with ADLs/IADLs, return to sport/leisure activities, improved balance and increased motion          Objective     Passive Range of Motion     Right Ankle/Foot    Dorsiflexion (ke): 0 degrees   Dorsiflexion (kf): 0 degrees    Inversion: 12 degrees   Eversion: 10 degrees              Precautions: see protocol       Manual                                                                                   Exercise Diary                                                                                                                                                                                                                                                                                      Modalities

## 2019-10-21 ENCOUNTER — OFFICE VISIT (OUTPATIENT)
Dept: PHYSICAL THERAPY | Age: 34
End: 2019-10-21
Payer: COMMERCIAL

## 2019-10-21 DIAGNOSIS — S86.001A ACHILLES TENDON INJURY, RIGHT, INITIAL ENCOUNTER: Primary | ICD-10-CM

## 2019-10-21 PROCEDURE — 97110 THERAPEUTIC EXERCISES: CPT | Performed by: PHYSICAL THERAPIST

## 2019-10-21 PROCEDURE — 97140 MANUAL THERAPY 1/> REGIONS: CPT | Performed by: PHYSICAL THERAPIST

## 2019-10-21 NOTE — PROGRESS NOTES
Daily Note     Today's date: 10/21/2019  Patient name: Elder Thompson  : 1985  MRN: [id number]  Referring provider: Author Norberto MD  Dx:   Encounter Diagnosis     ICD-10-CM    1  Achilles tendon injury, right, initial encounter S86 001A                   Subjective: Pt reports less pain overall      Objective: See treatment diary below      Assessment: Tolerated treatment well  Patient demonstrated fatigue post treatment, would benefit from continued PT and tolerated AROM per protocol well  Plan: Continue per plan of care  Progress treatment as tolerated         Precautions: see protocol       Manual  10/21            PROM per protocol 10'                                                                    Exercise Diary  10/21            Walking/stairs 15'            AROM per protocol x20 ea            Knee ext 3x10 3 plates            Hip abd 3x10 3 plates                                                                                                                                                                                                                                Modalities

## 2019-10-24 ENCOUNTER — OFFICE VISIT (OUTPATIENT)
Dept: PHYSICAL THERAPY | Age: 34
End: 2019-10-24
Payer: COMMERCIAL

## 2019-10-24 DIAGNOSIS — S86.001A ACHILLES TENDON INJURY, RIGHT, INITIAL ENCOUNTER: Primary | ICD-10-CM

## 2019-10-24 PROCEDURE — 97110 THERAPEUTIC EXERCISES: CPT | Performed by: PHYSICAL THERAPIST

## 2019-10-24 PROCEDURE — 97140 MANUAL THERAPY 1/> REGIONS: CPT | Performed by: PHYSICAL THERAPIST

## 2019-10-24 PROCEDURE — 97116 GAIT TRAINING THERAPY: CPT | Performed by: PHYSICAL THERAPIST

## 2019-10-24 NOTE — PROGRESS NOTES
Daily Note     Today's date: 10/24/2019  Patient name: Praful Barbour  : 1985  MRN: [id number]  Referring provider: Linden Schirmer, MD  Dx:   Encounter Diagnosis     ICD-10-CM    1  Achilles tendon injury, right, initial encounter S86 001A                   Subjective: Pt reports initial heel pain/discomfort following removal of heel wedge but states she has had minimal pain over last few weeks  Objective: See treatment diary below      Assessment: Tolerated treatment well  Patient demonstrated fatigue post treatment, would benefit from continued PT and pt is able to walk 50' I with crutches before taking break due to fatigue  Plan: Continue per plan of care  Progress treatment as tolerated         Precautions: see protocol       Manual  10/21 10/24           PROM per protocol 10' 15'                                                                   Exercise Diary  10/21 10/24           Walking/stairs 15' 15'           AROM per protocol x20 ea x20ea           Knee ext 3x10 3 plates 1Q61 3 plates           Hip abd 3x10 3 plates 7I18 3 plates                                                                                                                                                                                                                               Modalities

## 2019-10-28 ENCOUNTER — OFFICE VISIT (OUTPATIENT)
Dept: PHYSICAL THERAPY | Age: 34
End: 2019-10-28
Payer: COMMERCIAL

## 2019-10-28 DIAGNOSIS — S86.001A ACHILLES TENDON INJURY, RIGHT, INITIAL ENCOUNTER: Primary | ICD-10-CM

## 2019-10-28 PROCEDURE — 97140 MANUAL THERAPY 1/> REGIONS: CPT | Performed by: PHYSICAL THERAPIST

## 2019-10-28 PROCEDURE — 97110 THERAPEUTIC EXERCISES: CPT | Performed by: PHYSICAL THERAPIST

## 2019-10-28 NOTE — PROGRESS NOTES
Daily Note     Today's date: 10/28/2019  Patient name: Elder Thompson  : 1985  MRN: [id number]  Referring provider: Author Norberto MD  Dx:   Encounter Diagnosis     ICD-10-CM    1  Achilles tendon injury, right, initial encounter S86 001A                   Subjective: pt reports some swelling/soreness after using scooter around city over weekend      Objective: See treatment diary below      Assessment: Tolerated treatment well  Patient demonstrated fatigue post treatment, would benefit from continued PT and pt was able to walk 2x150' with minimal pain after replacing heel lift to smallest height  Plan: Continue per plan of care  Progress treament per protocol        Precautions: see protocol       Manual  10/21 10/24 10/28          PROM per protocol 10' 15' 10'                                                                  Exercise Diary  10/21 10/24 10/28          Walking/stairs 15' 15' 15'          AROM per protocol x20 ea x20ea x20ea          Knee ext 3x10 3 plates 6C18 3 plates 5V66 3 plates          Hip abd 3x10 3 plates 3E67 3 plates 6A79 3 plates                                                                                                                                                                                                                              Modalities    10/28          CP   10'

## 2019-10-31 ENCOUNTER — APPOINTMENT (EMERGENCY)
Dept: NON INVASIVE DIAGNOSTICS | Facility: HOSPITAL | Age: 34
End: 2019-10-31
Payer: COMMERCIAL

## 2019-10-31 ENCOUNTER — OFFICE VISIT (OUTPATIENT)
Dept: PHYSICAL THERAPY | Age: 34
End: 2019-10-31
Payer: COMMERCIAL

## 2019-10-31 ENCOUNTER — HOSPITAL ENCOUNTER (EMERGENCY)
Facility: HOSPITAL | Age: 34
Discharge: HOME | End: 2019-10-31
Attending: EMERGENCY MEDICINE | Admitting: EMERGENCY MEDICINE
Payer: COMMERCIAL

## 2019-10-31 ENCOUNTER — TELEPHONE (OUTPATIENT)
Dept: OBGYN CLINIC | Facility: HOSPITAL | Age: 34
End: 2019-10-31

## 2019-10-31 VITALS
DIASTOLIC BLOOD PRESSURE: 85 MMHG | HEIGHT: 69 IN | WEIGHT: 229.28 LBS | SYSTOLIC BLOOD PRESSURE: 157 MMHG | OXYGEN SATURATION: 97 % | BODY MASS INDEX: 33.96 KG/M2 | RESPIRATION RATE: 20 BRPM | TEMPERATURE: 97.8 F | HEART RATE: 90 BPM

## 2019-10-31 DIAGNOSIS — S86.111A: Primary | ICD-10-CM

## 2019-10-31 DIAGNOSIS — S86.001A ACHILLES TENDON INJURY, RIGHT, INITIAL ENCOUNTER: Primary | ICD-10-CM

## 2019-10-31 PROCEDURE — 93971 EXTREMITY STUDY: CPT

## 2019-10-31 PROCEDURE — 97140 MANUAL THERAPY 1/> REGIONS: CPT | Performed by: PHYSICAL THERAPIST

## 2019-10-31 PROCEDURE — 99283 EMERGENCY DEPT VISIT LOW MDM: CPT

## 2019-10-31 PROCEDURE — 99284 EMERGENCY DEPT VISIT MOD MDM: CPT | Performed by: EMERGENCY MEDICINE

## 2019-10-31 NOTE — ED PROVIDER NOTES
History  Chief Complaint   Patient presents with    Foot Swelling     pt had achilles tendon surgery 4 weeks ago, now right calf and right foot area is more swollen than before sent for eval of poss DVT     29 yo female presents for evaluation of R calf, foot swelling s/p achilles repair 4 weeks ago  Has been in boot, partial weight bearing for past 2 weeks  Pain rated 6/10, constant, worse with movement  No other a/e factors  Denies CP/SOB, focal weakness/numbness/tingling  No hx of DVT/PE  No exogenous estrogen use  Prior to Admission Medications   Prescriptions Last Dose Informant Patient Reported? Taking?   aspirin (ECOTRIN) 325 mg EC tablet   No No   Sig: Take 1 tablet (325 mg total) by mouth 2 (two) times a day for 14 days   fexofenadine (ALLEGRA) 180 MG tablet  Self No No   Sig: Take 1 tablet (180 mg total) by mouth daily   Patient taking differently: Take 180 mg by mouth as needed    ibuprofen (MOTRIN) 800 mg tablet   Yes No   Sig: Take by mouth every 6 (six) hours as needed for mild pain   levonorgestrel (MIRENA) 20 MCG/24HR IUD   No No   Si Intra Uterine Device by Intrauterine route once for 1 dose   Patient taking differently: 1 each by Intrauterine route once    mometasone (NASONEX) 50 mcg/act nasal spray  Self No No   Si sprays into each nostril daily   Patient taking differently: 2 sprays into each nostril as needed    ondansetron (ZOFRAN-ODT) 4 mg disintegrating tablet   No No   Sig: Take 1 tablet (4 mg total) by mouth every 8 (eight) hours as needed for nausea or vomiting   Patient taking differently: Take 4 mg by mouth as needed for nausea or vomiting       Facility-Administered Medications: None       Past Medical History:   Diagnosis Date    Chronic sinus infection     Depression     Post-op pain        Past Surgical History:   Procedure Laterality Date    ESOPHAGOGASTRODUODENOSCOPY N/A 8/15/2018    Procedure: ESOPHAGOGASTRODUODENOSCOPY (EGD);   Surgeon: Joseline Amaya MD; Location: [facility]; Service: Gastroenterology    HAND SURGERY Right     KNEE ARTHROSCOPY Left     lateral meniscus tear, ACL tear    KNEE SURGERY Left     RI REPAIR ACHILLES TENDON,PRIMARY Right 9/30/2019    Procedure: REPAIR TENDON ACHILLES;  Surgeon: Radha Robles MD;  Location: [facility];  Service: Orthopedics    UPPER GASTROINTESTINAL ENDOSCOPY         Family History   Problem Relation Age of Onset    Depression Mother     Hypertension Mother     Diabetes Father     Hypertension Father     Cancer Father     Heart failure Father     Leukemia Father     No Known Problems Sister     No Known Problems Brother     Diabetes Paternal Grandmother     Hypertension Paternal Grandmother     Alcohol abuse Maternal Uncle     Depression Maternal Uncle     Mental illness Maternal Uncle     Alcohol abuse Cousin     Depression Cousin     Mental illness Cousin      I have reviewed and agree with the history as documented  Social History     Tobacco Use    Smoking status: Never Smoker    Smokeless tobacco: Never Used   Substance Use Topics    Alcohol use: Yes     Comment: social    Drug use: No        Review of Systems   Respiratory: Negative for shortness of breath  Cardiovascular: Negative for chest pain  Musculoskeletal:        Leg pain, swelling   Skin: Negative for rash  All other systems reviewed and are negative  Physical Exam  Physical Exam   Constitutional: She appears well-developed and well-nourished  No distress  HENT:   Head: Normocephalic  Eyes: Conjunctivae are normal  Right eye exhibits no discharge  Left eye exhibits no discharge  Neck: Normal range of motion  Cardiovascular: Intact distal pulses  Pulmonary/Chest: Effort normal  No respiratory distress  Musculoskeletal:   Trace RLE edema from lower calf to foot  Minimal TTP over R foot/ankle  Neurological: She is alert  Skin: Skin is warm and dry  Capillary refill takes less than 2 seconds   No rash noted  No erythema  No pallor  Psychiatric: She has a normal mood and affect  Nursing note and vitals reviewed  Vital Signs  ED Triage Vitals [10/31/19 1553]   Temperature Pulse Respirations Blood Pressure SpO2   97 8 °F (36 6 °C) 90 20 157/85 97 %      Temp Source Heart Rate Source Patient Position - Orthostatic VS BP Location FiO2 (%)   Oral Monitor Sitting Left arm --      Pain Score       6           Vitals:    10/31/19 1553   BP: 157/85   Pulse: 90   Patient Position - Orthostatic VS: Sitting         Visual Acuity      ED Medications  Medications - No data to display    Diagnostic Studies  Results Reviewed     None                 VAS lower limb venous duplex study, unilateral/limited    (Results Pending)              Procedures  Procedures       ED Course       -RLE duplex negative for DVT  -NSAIDs PRN  MDM    Disposition  Final diagnoses:   None     ED Disposition     None      Follow-up Information    None         Patient's Medications   Discharge Prescriptions    No medications on file     No discharge procedures on file      ED Provider  Electronically Signed by           Renea Barry DO  10/31/19 [id number]

## 2019-10-31 NOTE — PROGRESS NOTES
Daily Note     Today's date: 10/31/2019  Patient name: Vikram Bajwa  : 1985  MRN: [id number]  Referring provider: Stephanie Rodriguez MD  Dx:   Encounter Diagnosis     ICD-10-CM    1  Achilles tendon injury, right, initial encounter S86 001A                   Subjective: Pt reports increased pain today      Objective: See treatment diary below      Assessment: Tolerated treatment poor  Patient had R calf pain and swelling that has begun in the last 24 hours  Pt's physician was contacted and agreeded to sending pt to ED to examine for DVT  Plan: Continue per plan of care  Progress treatment as tolerated         Precautions: see protocol       Manual  10/21 10/24 10/28 10/31         PROM per protocol 10' 15' 10' 10'                                                                 Exercise Diary  10/21 10/24 10/28 10/31         Walking/stairs 15' 15' 15' np         AROM per protocol x20 ea x20ea x20ea np         Knee ext 3x10 3 plates 2D89 3 plates 3Q90 3 plates np         Hip abd 3x10 3 plates 3C99 3 plates 8H09 3 plates np                                                                                                                                                                                                                             Modalities    10/28          CP   10'

## 2019-10-31 NOTE — TELEPHONE ENCOUNTER
PT calling to let Dr Felicia Mortensen know that patient is showing signs of DVT; calf pain, swelling  Spoke with [facility] office Dr Tyler Lorenzo who recommends patient go to ER for evaluation and treatment  PT given above information and will send patient to ER

## 2019-11-01 PROCEDURE — 93971 EXTREMITY STUDY: CPT | Performed by: SURGERY

## 2019-11-01 NOTE — TELEPHONE ENCOUNTER
Talked with the patient and she feels that she is improved with the swelling from yesterday  She understands that Doppler was negative  She will call us if there is any other issues

## 2019-11-04 ENCOUNTER — OFFICE VISIT (OUTPATIENT)
Dept: PHYSICAL THERAPY | Age: 34
End: 2019-11-04
Payer: COMMERCIAL

## 2019-11-04 DIAGNOSIS — S86.001A ACHILLES TENDON INJURY, RIGHT, INITIAL ENCOUNTER: Primary | ICD-10-CM

## 2019-11-04 PROCEDURE — 97140 MANUAL THERAPY 1/> REGIONS: CPT | Performed by: PHYSICAL THERAPIST

## 2019-11-04 PROCEDURE — 97110 THERAPEUTIC EXERCISES: CPT | Performed by: PHYSICAL THERAPIST

## 2019-11-04 NOTE — PROGRESS NOTES
Daily Note     Today's date: 2019  Patient name: Thad Hernandez  : 1985  MRN: [id number]  Referring provider: Esteban Yeager MD  Dx:   Encounter Diagnosis     ICD-10-CM    1  Achilles tendon injury, right, initial encounter S86 001A                   Subjective:  Pt reports minimal pain      Objective: See treatment diary below      Assessment: Tolerated treatment well  Patient demonstrated fatigue post treatment, would benefit from continued PT and is able to actively bring ankle to neutral dorsiflexion      Plan: Continue per plan of care  Progress treatment as tolerated         Precautions: see protocol       Manual  10/21 10/24 10/28 10/31 11/4        PROM per protocol 10' 15' 10' 10' 10'                                                                Exercise Diary  10/21 10/24 10/28 10/31 11/4        Walking/stairs 15' 15' 15' np 10'        AROM per protocol x20 ea x20ea x20ea np x20 ea        Knee ext 3x10 3 plates 1C50 3 plates 8W72 3 plates np 2S33 3 plates        Hip abd 3x10 3 plates 2M02 3 plates 1A01 3 plates np 4S40 3 plates                                                                                                                                                                                                                            Modalities    10/28          CP   10'

## 2019-11-07 ENCOUNTER — OFFICE VISIT (OUTPATIENT)
Dept: PHYSICAL THERAPY | Age: 34
End: 2019-11-07
Payer: COMMERCIAL

## 2019-11-07 DIAGNOSIS — S86.001A ACHILLES TENDON INJURY, RIGHT, INITIAL ENCOUNTER: Primary | ICD-10-CM

## 2019-11-07 PROCEDURE — 97140 MANUAL THERAPY 1/> REGIONS: CPT | Performed by: PHYSICAL THERAPIST

## 2019-11-07 PROCEDURE — 97110 THERAPEUTIC EXERCISES: CPT | Performed by: PHYSICAL THERAPIST

## 2019-11-07 NOTE — PROGRESS NOTES
Daily Note     Today's date: 2019  Patient name: Mariana Cline  : 1985  MRN: [id number]  Referring provider: Shelbi Aly MD  Dx:   Encounter Diagnosis     ICD-10-CM    1  Achilles tendon injury, right, initial encounter S86 001A                   Subjective: Pt reports increased pain this week after removing last heel lift  Objective: See treatment diary below      Assessment: Tolerated treatment well  Patient demonstrated fatigue post treatment, would benefit from continued PT and ambulated with one crutch on L side for first time  Plan: Continue per plan of care  Progress treatment as tolerated         Precautions: see protocol       Manual  10/21 10/24 10/28 10/31 11/4 11/7       PROM per protocol 10' 15' 10' 10' 10' 10'                                                               Exercise Diary  10/21 10/24 10/28 10/31 11/4 11/7       Walking/stairs 15' 15' 15' np 10' 5'       AROM per protocol x20 ea x20ea x20ea np x20 ea x20 ea       Knee ext 3x10 3 plates 0O73 3 plates 1C43 3 plates np 6O55 3 plates 4G56 3 plates       Hip abd 3x10 3 plates 3Z45 3 plates 6N92 3 plates np 5O35 3 plates 6U91 4 plates                                                                                                                                                                                                                           Modalities    10/28          CP   10'

## 2019-11-11 ENCOUNTER — OFFICE VISIT (OUTPATIENT)
Dept: PHYSICAL THERAPY | Age: 34
End: 2019-11-11
Payer: COMMERCIAL

## 2019-11-11 DIAGNOSIS — S86.001A ACHILLES TENDON INJURY, RIGHT, INITIAL ENCOUNTER: Primary | ICD-10-CM

## 2019-11-11 PROCEDURE — 97110 THERAPEUTIC EXERCISES: CPT | Performed by: PHYSICAL THERAPIST

## 2019-11-11 PROCEDURE — 97140 MANUAL THERAPY 1/> REGIONS: CPT | Performed by: PHYSICAL THERAPIST

## 2019-11-11 NOTE — PROGRESS NOTES
Daily Note     Today's date: 2019  Patient name: Megan Ley  : 1985  MRN: [id number]  Referring provider: Mike Cabezas MD  Dx: No diagnosis found  Subjective: Pt reports pain in foot      Objective: See treatment diary below      Assessment: Tolerated treatment well  Patient demonstrated fatigue post treatment, would benefit from continued PT and was able to walk 2x 300ft without crutches  Pt's pain is loacated in plantar aspect of foot  Plan: Continue per plan of care  Progress treatment as tolerated         Precautions: see protocol       Manual  10/21 10/24 10/28 10/31 11/4 11/7 11/11      PROM per protocol 10' 15' 10' 10' 10' 10' 10'      STM PF       TERRELL                                                 Exercise Diary  10/21 10/24 10/28 10/31 11/4 11/7 11/11      Walking/stairs 15' 15' 15' np 10' 5' 10'      AROM per protocol x20 ea x20ea x20ea np x20 ea x20 ea x20 ea      Knee ext 3x10 3 plates 9L69 3 plates 0P81 3 plates np 1J87 3 plates 1L16 3 plates 1S31 4 plates      Hip abd 8U68 3 plates 5W23 3 plates 3Y99 3 plates np 9X91 3 plates 9N71 4 plates 0A32 4 plates                                                                                                                                                                                                                          Modalities    10/28          CP   10'

## 2019-11-13 ENCOUNTER — OFFICE VISIT (OUTPATIENT)
Dept: OBGYN CLINIC | Facility: OTHER | Age: 34
End: 2019-11-13

## 2019-11-13 VITALS
WEIGHT: 230 LBS | DIASTOLIC BLOOD PRESSURE: 103 MMHG | HEIGHT: 69 IN | BODY MASS INDEX: 34.07 KG/M2 | SYSTOLIC BLOOD PRESSURE: 151 MMHG | HEART RATE: 99 BPM

## 2019-11-13 DIAGNOSIS — S86.001A ACHILLES TENDON INJURY, RIGHT, INITIAL ENCOUNTER: Primary | ICD-10-CM

## 2019-11-13 PROCEDURE — 99024 POSTOP FOLLOW-UP VISIT: CPT | Performed by: ORTHOPAEDIC SURGERY

## 2019-11-13 NOTE — LETTER
November 13, 2019     Patient: Gregg Bowens   YOB: 1985   Date of Visit: 11/13/2019       To Whom it May Concern:    Gregg Bowens is under my professional care  She was seen in my office on 11/13/2019  She can return to work on 11/18/19  At that time she should be allowed to use the boot, crutches or scooter as needed  She should also be able to rest if needed upon return and progress activity as tolerated  If you have any questions or concerns, please don't hesitate to call           Sincerely,          Kasandra Mendenhall MD        CC: No Recipients

## 2019-11-13 NOTE — PROGRESS NOTES
Orthopaedic Surgery - Office Note  Brian Santos (89 y o  female)   : 1985   MRN: [id number]  Encounter Date: 2019    Chief Complaint   Patient presents with    Right Lower Leg - Follow-up       Assessment / Plan  S/p achilles tendon repair performed on 2019    · Activity as tolerated  · Continue with WBAT in boot, we did discuss with the patient slowly weaning out of the boot towards the end the next 4 week period  · Continue with ice and analgesics if needed  · Continue with outpatient PT for Achilles tendon repair following protocol provided to the patient at previous visit  · Work on range of motion with a home exercise program in addition to formal physical therapy  · Patient was given a return to work note for 2019  At that time she should be allowed to work using the Singspiel, crutches or Rollator if needed  She should also be given a chance to rest if needed and can progress activity as tolerated  Return in about 4 weeks (around 2019)  History of Present Illness  Brian Santos is a 30 y o  female follow-up status post right Achilles tendon repair  She reports her pain is well controlled with ibuprofen and Tylenol  She did progress from the crutches over the last week and still is using the CAM boot at all times  She did go back to work last week when she developed increasing swelling and was evaluated in the ER for DVT but was negative  She has been doing outpatient PT as ordered  She denies any fevers or chills  Review of Systems  Pertinent items are noted in HPI  All other systems were reviewed and are negative  Physical Exam  BP (!) 151/103   Pulse 99   Ht 5' 9" (1 753 m)   Wt 104 kg (230 lb)   BMI 33 97 kg/m²   Cons: Appears well  No apparent distress  Psych: Alert  Oriented x3  Mood and affect normal   Eyes: PERRLA, EOMI  Resp: Normal effort  No audible wheezing or stridor  CV: Palpable pulse  No discernable arrhythmia  No LE edema    Lymph:  No palpable cervical, axillary, or inguinal lymphadenopathy  Skin: Warm  No palpable masses  No visible lesions  Neuro: Normal muscle tone  Normal and symmetric DTR's  Right Foot & Ankle Exam  Alignment:  Normal ankle alignment  Inspection:  No swelling  No edema  No erythema  No ecchymosis  No muscle atrophy  No deformity  Palpation:  incision tenderness  No effusion  No warmth  No crepitus  No clicking, catching, or snapping  ROM:  Ankle DF 5  Ankle PF 50  Strength:  Not tested  Stability:  Not tested  Tests:  No pertinent positive or negative tests  Neurovascular:  Sensation intact in DP/SP/Crews/Sa/T nerve distributions  2+ DP & PT pulses  Gait:  Non weight-bearing with the use of a Rollator    Studies Reviewed  No studies to review    Procedures  No procedures today  Medical, Surgical, Family, and Social History  The patient's medical history, family history, and social history, were reviewed and updated as appropriate  Past Medical History:   Diagnosis Date    Chronic sinus infection     Depression     Post-op pain        Past Surgical History:   Procedure Laterality Date    ESOPHAGOGASTRODUODENOSCOPY N/A 8/15/2018    Procedure: ESOPHAGOGASTRODUODENOSCOPY (EGD); Surgeon: Ricky Garcia MD;  Location: [facility];   Service: Gastroenterology    HAND SURGERY Right     KNEE ARTHROSCOPY Left     lateral meniscus tear, ACL tear    KNEE SURGERY Left     MS REPAIR ACHILLES TENDON,PRIMARY Right 9/30/2019    Procedure: REPAIR TENDON ACHILLES;  Surgeon: Isela Patel MD;  Location: [facility];  Service: Orthopedics    UPPER GASTROINTESTINAL ENDOSCOPY         Family History   Problem Relation Age of Onset    Depression Mother     Hypertension Mother     Diabetes Father     Hypertension Father     Cancer Father     Heart failure Father     Leukemia Father     No Known Problems Sister     No Known Problems Brother     Diabetes Paternal Grandmother     Hypertension Paternal Grandmother  Alcohol abuse Maternal Uncle     Depression Maternal Uncle     Mental illness Maternal Uncle     Alcohol abuse Cousin     Depression Cousin     Mental illness Cousin        Social History     Occupational History    Not on file   Tobacco Use    Smoking status: Never Smoker    Smokeless tobacco: Never Used   Substance and Sexual Activity    Alcohol use: Yes     Comment: social    Drug use: No    Sexual activity: Not on file       No Known Allergies      Current Outpatient Medications:     fexofenadine (ALLEGRA) 180 MG tablet, Take 1 tablet (180 mg total) by mouth daily (Patient taking differently: Take 180 mg by mouth as needed ), Disp: , Rfl: 0    ibuprofen (MOTRIN) 800 mg tablet, Take by mouth every 6 (six) hours as needed for mild pain, Disp: , Rfl:     mometasone (NASONEX) 50 mcg/act nasal spray, 2 sprays into each nostril daily (Patient taking differently: 2 sprays into each nostril as needed ), Disp: 17 g, Rfl: 5    aspirin (ECOTRIN) 325 mg EC tablet, Take 1 tablet (325 mg total) by mouth 2 (two) times a day for 14 days, Disp: 28 tablet, Rfl: 0    levonorgestrel (MIRENA) 20 MCG/24HR IUD, 1 Intra Uterine Device by Intrauterine route once for 1 dose (Patient taking differently: 1 each by Intrauterine route once ), Disp: 1 each, Rfl: 0    ondansetron (ZOFRAN-ODT) 4 mg disintegrating tablet, Take 1 tablet (4 mg total) by mouth every 8 (eight) hours as needed for nausea or vomiting (Patient not taking: Reported on 11/13/2019), Disp: 15 tablet, Rfl: 0      Yovnai Dueñas PA-C    Scribe Attestation    I,:    am acting as a scribe while in the presence of the attending physician :        I,:    personally performed the services described in this documentation    as scribed in my presence :

## 2019-11-14 ENCOUNTER — OFFICE VISIT (OUTPATIENT)
Dept: PHYSICAL THERAPY | Age: 34
End: 2019-11-14
Payer: COMMERCIAL

## 2019-11-14 DIAGNOSIS — S86.001A ACHILLES TENDON INJURY, RIGHT, INITIAL ENCOUNTER: Primary | ICD-10-CM

## 2019-11-14 PROCEDURE — 97140 MANUAL THERAPY 1/> REGIONS: CPT | Performed by: PHYSICAL THERAPIST

## 2019-11-14 PROCEDURE — 97110 THERAPEUTIC EXERCISES: CPT | Performed by: PHYSICAL THERAPIST

## 2019-11-14 NOTE — PROGRESS NOTES
Daily Note     Today's date: 2019  Patient name: Emma Archuleta  : 1985  MRN: [id number]  Referring provider: Marjorie Malin MD  Dx:   Encounter Diagnosis     ICD-10-CM    1  Achilles tendon injury, right, initial encounter S86 001A                   Subjective: Pt reports no changes      Objective: See treatment diary below      Assessment: Tolerated treatment well  Patient demonstrated fatigue post treatment, would benefit from continued PT and continues to have pain in plantar fascia       Plan: Continue per plan of care  Progress treatment as tolerated         Precautions: see protocol       Manual  10/21 10/24 10/28 10/31 11/4 11/7 11/11 11/14     PROM per protocol 10' 15' 10' 10' 10' 10' 10' 10'     STM PF       XANDER TERRELL                                                Exercise Diary  10/21 10/24 10/28 10/31 11/4 11/7 11/11 11/14     Walking/stairs 15' 15' 15' np 10' 5' 10' 10'     AROM per protocol x20 ea x20ea x20ea np x20 ea x20 ea x20 ea x20 ea     Knee ext 3x10 3 plates 8C95 3 plates 3F02 3 plates np 6E84 3 plates 7Z39 3 plates 9T39 4 plates 7P49 4 plates     Hip abd 4P20 3 plates 6D67 3 plates 3D59 3 plates np 2N14 3 plates 4V31 4 plates 8P03 4 plates 0W74 4 plates                                                                                                                                                                                                                         Modalities    10/28     11/14     CP   10'     10'

## 2019-11-15 ENCOUNTER — TELEPHONE (OUTPATIENT)
Dept: OBGYN CLINIC | Facility: HOSPITAL | Age: 34
End: 2019-11-15

## 2019-11-15 NOTE — TELEPHONE ENCOUNTER
Caller: patient  Call back number: [phone]  Fax number: [phone] nyla Moyer  Patient's doctor: Dr Raffi Clemente    Patient called stating her work note needs to say she will be returning on 11/28/19 on light duty  It also must have a duration added   Please fax

## 2019-11-18 ENCOUNTER — OFFICE VISIT (OUTPATIENT)
Dept: PHYSICAL THERAPY | Age: 34
End: 2019-11-18
Payer: COMMERCIAL

## 2019-11-18 DIAGNOSIS — S86.001A ACHILLES TENDON INJURY, RIGHT, INITIAL ENCOUNTER: Primary | ICD-10-CM

## 2019-11-18 PROCEDURE — 97110 THERAPEUTIC EXERCISES: CPT | Performed by: PHYSICAL THERAPIST

## 2019-11-18 PROCEDURE — 97140 MANUAL THERAPY 1/> REGIONS: CPT | Performed by: PHYSICAL THERAPIST

## 2019-11-18 NOTE — PROGRESS NOTES
Daily Note     Today's date: 2019  Patient name: Shreya Gallardo  : 1985  MRN: [id number]  Referring provider: Rocael Webb MD  Dx:   Encounter Diagnosis     ICD-10-CM    1  Achilles tendon injury, right, initial encounter S86 001A                   Subjective: Pt reports walking in shoe with heel lift, with some discomfort      Objective: See treatment diary below      Assessment: Tolerated treatment well  Patient demonstrated fatigue post treatment, would benefit from continued PT and tolerated ankle isometrics well      Plan: Continue per plan of care  Progress treatment as tolerated         Precautions: see protocol       Manual  10/21 10/24 10/28 10/31 11/4 11/7 11/11 11/14 11/18    PROM per protocol 10' 15' 10' 10' 10' 10' 10' 10' 10'    STM PF       XANDER TERRELL                                               Exercise Diary  10/21 10/24 10/28 10/31 11/4 11/7 11/11 11/14 11/18    Walking/stairs 15' 13' 15' np 10' 5' 10' 10' np    AROM per protocol x20 ea x20ea x20ea np x20 ea x20 ea x20 ea x20 ea x20ea    Knee ext 3x10 3 plates 5O56 3 plates 6I92 3 plates np 6V12 3 plates 9Y87 3 plates 6T37 4 plates 5V17 4 plates 1Y59 4 plates    Hip abd 0G76 3 plates 8S07 3 plates 3I91 3 plates np 8E52 3 plates 6B93 4 plates 7I77 4 plates 2E01 4 plates 9A12 4 plates    Ankle Isometrics         RTB x10 ea                                                                                                                                                                                                            Modalities    10/28     11/14 11/18    CP   10'     10' 10'

## 2019-11-21 ENCOUNTER — OFFICE VISIT (OUTPATIENT)
Dept: PHYSICAL THERAPY | Age: 34
End: 2019-11-21
Payer: COMMERCIAL

## 2019-11-21 DIAGNOSIS — S86.001A ACHILLES TENDON INJURY, RIGHT, INITIAL ENCOUNTER: Primary | ICD-10-CM

## 2019-11-21 PROCEDURE — 97140 MANUAL THERAPY 1/> REGIONS: CPT | Performed by: PHYSICAL THERAPIST

## 2019-11-21 PROCEDURE — 97110 THERAPEUTIC EXERCISES: CPT | Performed by: PHYSICAL THERAPIST

## 2019-11-21 NOTE — PROGRESS NOTES
Daily Note     Today's date: 2019  Patient name: Beth Lancaster  : 1985  MRN: [id number]  Referring provider: Lissette Sorensen MD  Dx:   Encounter Diagnosis     ICD-10-CM    1  Achilles tendon injury, right, initial encounter S86 001A                   Subjective: Pt reports pain over last few days      Objective: See treatment diary below      Assessment: Tolerated treatment well  Patient demonstrated fatigue post treatment, would benefit from continued PT and pts ROM is appropriate given protocol  Pt has begun walking in shoe with wedge  Plan: Continue per plan of care  Progress treatment as tolerated         Precautions: see protocol       Manual  10/21 10/24 10/28 10/31 11/4 11/7 11/11 11/14 11/18 11/21   PROM per protocol 10' 15' 10' 10' 10' 10' 10' 10' 10' 10'   STM PF       XANDER TERRELL np                                              Exercise Diary  10/21 10/24 10/28 10/31 11/4 11/7 11/11 11/14 11/18 11/21   Walking/stairs 15' 13' 13' np 10' 5' 10' 10' np np   AROM per protocol x20 ea x20ea x20ea np x20 ea x20 ea x20 ea x20 ea x20ea x20 ea   Knee ext 3x10 3 plates 4E41 3 plates 8I06 3 plates np 8H71 3 plates 2B74 3 plates 4E41 4 plates 0I66 4 plates 7Q70 4 plates np   Hip abd 4M34 3 plates 3S28 3 plates 3W54 3 plates np 7C07 3 plates 5K21 4 plates 7B54 4 plates 5C93 4 plates 9Y60 4 plates np   Ankle Isometrics         RTB x10 ea  RTB x10 ea                                                                                                                                                                                                           Modalities    10/28     11/14 11/18 11/21   CP   10'     10' 10' 15'

## 2019-11-25 ENCOUNTER — OFFICE VISIT (OUTPATIENT)
Dept: PHYSICAL THERAPY | Age: 34
End: 2019-11-25
Payer: COMMERCIAL

## 2019-11-25 ENCOUNTER — TELEPHONE (OUTPATIENT)
Dept: OBGYN CLINIC | Facility: HOSPITAL | Age: 34
End: 2019-11-25

## 2019-11-25 DIAGNOSIS — S86.001A ACHILLES TENDON INJURY, RIGHT, INITIAL ENCOUNTER: Primary | ICD-10-CM

## 2019-11-25 PROCEDURE — 97140 MANUAL THERAPY 1/> REGIONS: CPT | Performed by: PHYSICAL THERAPIST

## 2019-11-25 PROCEDURE — 97110 THERAPEUTIC EXERCISES: CPT | Performed by: PHYSICAL THERAPIST

## 2019-11-25 NOTE — PROGRESS NOTES
Daily Note     Today's date: 2019  Patient name: Brian Santos  : 1985  MRN: [id number]  Referring provider: Harish Alberts MD  Dx:   Encounter Diagnosis     ICD-10-CM    1  Achilles tendon injury, right, initial encounter S86 001A                   Subjective: Pt reports soreness      Objective: See treatment diary below      Assessment: Tolerated treatment well  Patient demonstrated fatigue post treatment, would benefit from continued PT and was able to bike but needed frequent breaks due to achilles pain/decreased cardiovascular endurance  Plan: Continue per plan of care  Progress treatment as tolerated         Precautions: see protocol       Manual     PROM per protocol TERRELL         10'   STM PF np         np                                              Exercise Diary     Walking/stairs np         np   AROM per protocol TERRELL         x20 ea   Knee ext np         np   Hip abd np         np   Ankle Isometrics RTB x20 ea          RTB x10 ea    Bike 20'                                                                                                                                                                                                      Modalities              CP 10'

## 2019-11-25 NOTE — TELEPHONE ENCOUNTER
Called patient and gave her the update of her paperwork   She said [employer] did not receive her paperwork so I will fax it over to them again

## 2019-11-27 ENCOUNTER — OFFICE VISIT (OUTPATIENT)
Dept: PHYSICAL THERAPY | Age: 34
End: 2019-11-27
Payer: COMMERCIAL

## 2019-11-27 DIAGNOSIS — S86.001A ACHILLES TENDON INJURY, RIGHT, INITIAL ENCOUNTER: Primary | ICD-10-CM

## 2019-11-27 PROCEDURE — 97140 MANUAL THERAPY 1/> REGIONS: CPT | Performed by: PHYSICAL THERAPIST

## 2019-11-27 PROCEDURE — 97110 THERAPEUTIC EXERCISES: CPT | Performed by: PHYSICAL THERAPIST

## 2019-11-27 NOTE — PROGRESS NOTES
Daily Note     Today's date: 2019  Patient name: Jorge Moreland  : 1985  MRN: [id number]  Referring provider: Anne Smith MD  Dx:   Encounter Diagnosis     ICD-10-CM    1  Achilles tendon injury, right, initial encounter S86 001A                   Subjective: Pt reports no changes      Objective: See treatment diary below      Assessment: Tolerated treatment well  Patient demonstrated fatigue post treatment, would benefit from continued PT and is progressing through protocol well  Pt has good scar mobility      Plan: Continue per plan of care  Progress treatment as tolerated         Precautions: see protocol       Manual     PROM per protocol XANDER TERRELL        10'   STM PF np TERRELL        np                                              Exercise Diary     Walking/stairs np np        np   AROM per protocol x20 ea RTB completed        x20 ea   Knee ext np 3x10 4 plates        np   Hip abd np 3x10 4 plates        np   Ankle Isometrics RTB x20 ea  RTB x20 ea         RTB x10 ea    Bike 20' 10'                                                                                                                                                                                                     Modalities              CP 10'

## 2019-12-02 ENCOUNTER — APPOINTMENT (OUTPATIENT)
Dept: PHYSICAL THERAPY | Age: 34
End: 2019-12-02
Payer: COMMERCIAL

## 2019-12-03 ENCOUNTER — OFFICE VISIT (OUTPATIENT)
Dept: PHYSICAL THERAPY | Age: 34
End: 2019-12-03
Payer: COMMERCIAL

## 2019-12-03 DIAGNOSIS — S86.001A ACHILLES TENDON INJURY, RIGHT, INITIAL ENCOUNTER: Primary | ICD-10-CM

## 2019-12-03 PROCEDURE — 97140 MANUAL THERAPY 1/> REGIONS: CPT | Performed by: PHYSICAL THERAPIST

## 2019-12-03 PROCEDURE — 97110 THERAPEUTIC EXERCISES: CPT | Performed by: PHYSICAL THERAPIST

## 2019-12-03 NOTE — PROGRESS NOTES
Daily Note     Today's date: 12/3/2019  Patient name: Megan Ley  : 1985  MRN: [id number]  Referring provider: Mike Cabezas MD  Dx:   Encounter Diagnosis     ICD-10-CM    1  Achilles tendon injury, right, initial encounter S86 001A                   Subjective: Pt reports stiffness      Objective: See treatment diary below      Assessment: Tolerated treatment well  Patient demonstrated fatigue post treatment, would benefit from continued PT and pt has increased active/passive dorsiflexion, but continues to have pain with active plantarflexion and inversion      Plan: Continue per plan of care  Progress treatment as tolerated         Precautions: see protocol       Manual  11/25 11/27 12/3       11/21   PROM per protocol XANDER TERRELL       10'   STM PF np TERRELL TERRELL       np                                              Exercise Diary  11/25 11/27 12/3       11/21   Walking/stairs np np np       np   AROM per protocol x20 ea RTB completed np       x20 ea   Knee ext np 3x10 4 plates np       np   Hip abd np 3x10 4 plates np       np   Ankle Isometrics RTB x20 ea  RTB x20 ea  dc       RTB x10 ea    Bike 20' 10' 10'          Ankle 4 way   x20 RTB          Mini squats   x10                                                                                                                                                                          Modalities  11/25  12/3          CP 10'  10'

## 2019-12-05 ENCOUNTER — OFFICE VISIT (OUTPATIENT)
Dept: PHYSICAL THERAPY | Age: 34
End: 2019-12-05
Payer: COMMERCIAL

## 2019-12-05 DIAGNOSIS — S86.001A ACHILLES TENDON INJURY, RIGHT, INITIAL ENCOUNTER: Primary | ICD-10-CM

## 2019-12-05 PROCEDURE — 97110 THERAPEUTIC EXERCISES: CPT | Performed by: PHYSICAL THERAPIST

## 2019-12-05 PROCEDURE — 97140 MANUAL THERAPY 1/> REGIONS: CPT | Performed by: PHYSICAL THERAPIST

## 2019-12-05 PROCEDURE — 97112 NEUROMUSCULAR REEDUCATION: CPT | Performed by: PHYSICAL THERAPIST

## 2019-12-05 NOTE — PROGRESS NOTES
Daily Note     Today's date: 2019  Patient name: Josi Cameron  : 1985  MRN: [id number]  Referring provider: Luca Aleman MD  Dx:   Encounter Diagnosis     ICD-10-CM    1  Achilles tendon injury, right, initial encounter S86 001A                   Subjective: Pt reports minimal pain      Objective: See treatment diary below      Assessment: Tolerated treatment well  Patient demonstrated fatigue post treatment, would benefit from continued PT and is appropriately challenged with exercises at this time  Plan: Continue per plan of care  Progress treatment as tolerated         Precautions: see protocol       Manual  11/25 11/27 12/3 12/5      11/21   PROM per protocol XANDER TERRELL TERRELL      10'   STM PF np TERRELL TERRELL TERRELL      np                                              Exercise Diary  11/25 11/27 12/3 12/5      11/21   Walking/stairs np np np np      np   AROM per protocol x20 ea RTB completed np np      x20 ea   Knee ext np 3x10 4 plates np 5X01 4 plates      np   Hip abd np 3x10 4 plates np 0G99 4 plates      np   Ankle Isometrics RTB x20 ea  RTB x20 ea  dc np      RTB x10 ea    Bike 20' 10' 10' 10'         Ankle 4 way   x20 RTB 2x20  RTB         Mini squats   x10 x10                                                                                                                                                                         Modalities  11/25  12/3          CP 10'  10'

## 2019-12-09 ENCOUNTER — EVALUATION (OUTPATIENT)
Dept: PHYSICAL THERAPY | Age: 34
End: 2019-12-09
Payer: COMMERCIAL

## 2019-12-09 DIAGNOSIS — S86.001A ACHILLES TENDON INJURY, RIGHT, INITIAL ENCOUNTER: Primary | ICD-10-CM

## 2019-12-09 PROCEDURE — 97140 MANUAL THERAPY 1/> REGIONS: CPT | Performed by: PHYSICAL THERAPIST

## 2019-12-09 PROCEDURE — 97110 THERAPEUTIC EXERCISES: CPT | Performed by: PHYSICAL THERAPIST

## 2019-12-09 PROCEDURE — 97750 PHYSICAL PERFORMANCE TEST: CPT | Performed by: PHYSICAL THERAPIST

## 2019-12-09 PROCEDURE — 97112 NEUROMUSCULAR REEDUCATION: CPT | Performed by: PHYSICAL THERAPIST

## 2019-12-09 NOTE — PROGRESS NOTES
PT Evaluation     Today's date: 2019  Patient name: Jorge Moreland  : 1985  MRN: [id number]  Referring provider: Anne Smith MD  Dx:   Encounter Diagnosis     ICD-10-CM    1  Achilles tendon injury, right, initial encounter S86 001A                   Assessment  Assessment details: Since presenting to PT s/p R achilles rupture and surgical repair, pt has had increase in R LE strength and ROM  Pt has progressed into street shoe from boot and is able to walk w/o AD/heel wedges but continues to have pain and swelling with ADLs  Pt will benefit from continued PT in order to improve LE ROM and strength and decrease pain with ADLs     Impairments: abnormal coordination, abnormal gait, abnormal muscle tone, abnormal or restricted ROM, activity intolerance, impaired balance, impaired physical strength, lacks appropriate home exercise program, pain with function, safety issue, weight-bearing intolerance and poor body mechanics    Goals  In 4 weeks pt will:  -Be independent with phase I of HEP-met  -Improve ankle ROM per protocol-met    By discharge pt will:  -Be independent with Phase II of HEP  -Have full active/passive ROM in R ankle  -Demonstrate 5/5 strength in all directions of R ankle    Plan  Patient would benefit from: skilled physical therapy  Planned modality interventions: TENS, thermotherapy: hydrocollator packs and cryotherapy  Planned therapy interventions: motor coordination training, muscle pump exercises, abdominal trunk stabilization, activity modification, balance/weight bearing training, neuromuscular re-education, patient education, strengthening, stretching, therapeutic activities, therapeutic exercise, home exercise program, graded exercise, graded activity, gait training, functional ROM exercises, compression and coordination  Frequency: 2x week  Duration in weeks: 8  Plan of Care beginning date: 2019  Plan of Care expiration date: 2020  Treatment plan discussed with: patient        Subjective Evaluation    History of Present Illness  Date of surgery: 2019  Mechanism of injury: Pt reports to PT following achilles tendon tear in basketball game in September with ensuing surgery 19  Pt presents WBAT in cam boot with 2 heel wedges with B crutches  Pt reports increased pain with ambulation and states she is able to move throughout home better with scooter     Pain  Current pain ratin  At best pain ratin  At worst pain rating: 10    Patient Goals  Patient goals for therapy: decreased pain, increased strength, independence with ADLs/IADLs, return to sport/leisure activities, improved balance and increased motion          Objective     Passive Range of Motion     Right Ankle/Foot    Dorsiflexion (ke): 10 degrees   Dorsiflexion (kf): 12 degrees               Precautions: see protocol       Manual              PROM  TERRELL            DF mobs TERRELL                                                       Exercise Diary              Bike 10'            Ankle 4 way x30 RTB            Dorsi mob step x20            Biodex 3x LOS, 1x WS                                                                                                                                                                                                                                Modalities

## 2019-12-11 ENCOUNTER — OFFICE VISIT (OUTPATIENT)
Dept: OBGYN CLINIC | Facility: OTHER | Age: 34
End: 2019-12-11

## 2019-12-11 VITALS
SYSTOLIC BLOOD PRESSURE: 153 MMHG | WEIGHT: 240 LBS | HEART RATE: 89 BPM | BODY MASS INDEX: 35.55 KG/M2 | HEIGHT: 69 IN | DIASTOLIC BLOOD PRESSURE: 97 MMHG

## 2019-12-11 DIAGNOSIS — S86.001A ACHILLES TENDON INJURY, RIGHT, INITIAL ENCOUNTER: Primary | ICD-10-CM

## 2019-12-11 PROCEDURE — 99024 POSTOP FOLLOW-UP VISIT: CPT | Performed by: ORTHOPAEDIC SURGERY

## 2019-12-12 ENCOUNTER — OFFICE VISIT (OUTPATIENT)
Dept: PHYSICAL THERAPY | Age: 34
End: 2019-12-12
Payer: COMMERCIAL

## 2019-12-12 DIAGNOSIS — S86.001A ACHILLES TENDON INJURY, RIGHT, INITIAL ENCOUNTER: Primary | ICD-10-CM

## 2019-12-12 PROCEDURE — 97112 NEUROMUSCULAR REEDUCATION: CPT | Performed by: SPECIALIST/TECHNOLOGIST

## 2019-12-12 PROCEDURE — 97110 THERAPEUTIC EXERCISES: CPT | Performed by: SPECIALIST/TECHNOLOGIST

## 2019-12-12 PROCEDURE — 97140 MANUAL THERAPY 1/> REGIONS: CPT | Performed by: SPECIALIST/TECHNOLOGIST

## 2019-12-12 NOTE — PROGRESS NOTES
Daily Note     Today's date: 2019  Patient name: Praful Barbour  : 1985  MRN: [id number]  Referring provider: Linden Schirmer, MD  Dx:   Encounter Diagnosis     ICD-10-CM    1  Achilles tendon injury, right, initial encounter S86 001A                   Subjective: Pt reports some soreness this visit which she attributes to being on her feet a lot at work  Objective: See treatment diary below    Assessment: Current interventions remain appropriate at this time, pt describes mild stress at achilles with biodex on level 12 to initiate PF/DF weight shifting  Tolerated treatment well  Patient would benefit from continued PT to safely progress surgical protocol to improve function of RLE  Plan: Continue per plan of care  Progress treatment as tolerated  Progress treament per protocol        Precautions: see protocol       Manual             PROM  XANDER TERRELL           DF mobs XANDER TERRELL                                                      Exercise Diary             Bike 10' -           Ankle 4 way x30 RTB x30 RTB           Dorsi mob step x20 x20           Biodex 3x LOS, 1x WS 3x LOS- L12, 2x WS                                                                                                                                                                                                                               Modalities

## 2019-12-16 ENCOUNTER — OFFICE VISIT (OUTPATIENT)
Dept: PHYSICAL THERAPY | Age: 34
End: 2019-12-16
Payer: COMMERCIAL

## 2019-12-16 DIAGNOSIS — S86.001A ACHILLES TENDON INJURY, RIGHT, INITIAL ENCOUNTER: Primary | ICD-10-CM

## 2019-12-16 PROCEDURE — 97140 MANUAL THERAPY 1/> REGIONS: CPT | Performed by: PHYSICAL THERAPIST

## 2019-12-16 PROCEDURE — 97110 THERAPEUTIC EXERCISES: CPT | Performed by: PHYSICAL THERAPIST

## 2019-12-16 PROCEDURE — 97112 NEUROMUSCULAR REEDUCATION: CPT | Performed by: PHYSICAL THERAPIST

## 2019-12-16 NOTE — PROGRESS NOTES
Daily Note     Today's date: 2019  Patient name: Renetta Syemour  : 1985  MRN: [id number]  Referring provider: Sadaf Clifford MD  Dx:   Encounter Diagnosis     ICD-10-CM    1  Achilles tendon injury, right, initial encounter S86 001A                   Subjective:Pt reports pain from work       Objective: See treatment diary below      Assessment: Tolerated treatment well  Patient demonstrated fatigue post treatment, would benefit from continued PT and plantar fascia pain is contributing to antalgic gait  Plan: Continue per plan of care  Progress treatment as tolerated         Precautions: see protocol       Manual            PROM  XANDER TERRELL          DF mobs XANDER TERRELL          PF STM   TERRELL                                        Exercise Diary            Bike 10' - 10'          Ankle 4 way x30 RTB x30 RTB np          Dorsi mob step x20 x20 x20          Biodex 3x LOS, 1x WS 3x LOS- L12, 2x WS np                                                                                                                                                                                                                              Modalities

## 2019-12-19 ENCOUNTER — OFFICE VISIT (OUTPATIENT)
Dept: PHYSICAL THERAPY | Age: 34
End: 2019-12-19
Payer: COMMERCIAL

## 2019-12-19 DIAGNOSIS — S86.001A ACHILLES TENDON INJURY, RIGHT, INITIAL ENCOUNTER: Primary | ICD-10-CM

## 2019-12-19 PROCEDURE — 97110 THERAPEUTIC EXERCISES: CPT | Performed by: PHYSICAL THERAPIST

## 2019-12-19 PROCEDURE — 97140 MANUAL THERAPY 1/> REGIONS: CPT | Performed by: PHYSICAL THERAPIST

## 2019-12-19 PROCEDURE — 97116 GAIT TRAINING THERAPY: CPT | Performed by: PHYSICAL THERAPIST

## 2019-12-19 PROCEDURE — 97530 THERAPEUTIC ACTIVITIES: CPT | Performed by: PHYSICAL THERAPIST

## 2019-12-19 NOTE — PROGRESS NOTES
Daily Note     Today's date: 2019  Patient name: Charley Kang  : 1985  MRN: [id number]  Referring provider: Marlene Perez MD  Dx:   Encounter Diagnosis     ICD-10-CM    1  Achilles tendon injury, right, initial encounter S86 001A                   Subjective: Pt reports increased pain after working yesterday      Objective: See treatment diary below      Assessment: Tolerated treatment well  Patient demonstrated fatigue post treatment, would benefit from continued PT and pt continues to have difficulty with dorsiflexion during ambulation  Plan: Continue per plan of care  Progress treatment as tolerated         Precautions: see protocol       Manual           PROM  TERRELL TERRELL TERRELL TERRELL         DF mobs TERRELL TERRELL TERRELL np         PF [facility]   TERRELL TERRELL                                       Exercise Diary           Bike 10' - 10' 12'         Ankle 4 way x30 RTB x30 RTB np x30 RTB         Dorsi mob step x20 x20 x20 np         Biodex 3x LOS, 1x WS 3x LOS- L12, 2x WS np np         Step down anterior    x30 4"         Walking    HHA 2x20ft                                                                                                                                                                                                   Modalities

## 2019-12-23 ENCOUNTER — OFFICE VISIT (OUTPATIENT)
Dept: PHYSICAL THERAPY | Age: 34
End: 2019-12-23
Payer: COMMERCIAL

## 2019-12-23 DIAGNOSIS — S86.001A ACHILLES TENDON INJURY, RIGHT, INITIAL ENCOUNTER: Primary | ICD-10-CM

## 2019-12-23 PROCEDURE — 97110 THERAPEUTIC EXERCISES: CPT | Performed by: PHYSICAL THERAPIST

## 2019-12-23 PROCEDURE — 97140 MANUAL THERAPY 1/> REGIONS: CPT | Performed by: PHYSICAL THERAPIST

## 2019-12-23 NOTE — PROGRESS NOTES
Daily Note     Today's date: 2019  Patient name: Neva Irving  : 1985  MRN: [id number]  Referring provider: Praveen Saldana MD  Dx:   Encounter Diagnosis     ICD-10-CM    1  Achilles tendon injury, right, initial encounter S86 001A                   Subjective: Pt reports heel pain with walking/standing         Objective: See treatment diary below      Assessment: Tolerated treatment well  Patient demonstrated fatigue post treatment, would benefit from continued PT and continues to have decreased dorsiflexion ROM and plantarflexion strength       Plan: Continue per plan of care  Progress treatment as tolerated         Precautions: see protocol       Manual          PROM  TERRELL TERRELL TERRELL TERRELL TERRELL        DF mobs TERRELL TERRELL TERRELL np TERRELL        PF STM   TERRELL TERRELL np                                      Exercise Diary          Bike 10' - 10' 12' np        Ankle 4 way x30 RTB x30 RTB np x30 RTB np        Dorsi mob step x20 x20 x20 np np        Biodex 3x LOS, 1x WS 3x LOS- L12, 2x WS np np 3' WS        Step down anterior    x30 4" np        Walking    HHA 2x20ft 3x50ft        Heel raises     2x20                                                                                                                                                                                     Modalities

## 2019-12-26 ENCOUNTER — APPOINTMENT (OUTPATIENT)
Dept: PHYSICAL THERAPY | Age: 34
End: 2019-12-26
Payer: COMMERCIAL

## 2019-12-27 ENCOUNTER — OFFICE VISIT (OUTPATIENT)
Dept: PHYSICAL THERAPY | Age: 34
End: 2019-12-27
Payer: COMMERCIAL

## 2019-12-27 DIAGNOSIS — S86.001A ACHILLES TENDON INJURY, RIGHT, INITIAL ENCOUNTER: Primary | ICD-10-CM

## 2019-12-27 PROCEDURE — 97112 NEUROMUSCULAR REEDUCATION: CPT | Performed by: PHYSICAL THERAPIST

## 2019-12-27 PROCEDURE — 97110 THERAPEUTIC EXERCISES: CPT | Performed by: PHYSICAL THERAPIST

## 2019-12-27 PROCEDURE — 97140 MANUAL THERAPY 1/> REGIONS: CPT | Performed by: PHYSICAL THERAPIST

## 2019-12-27 NOTE — PROGRESS NOTES
Daily Note     Today's date: 2019  Patient name: Hitesh Pinon  : 1985  MRN: [id number]  Referring provider: Suki De La Cruz MD  Dx:   Encounter Diagnosis     ICD-10-CM    1  Achilles tendon injury, right, initial encounter S86 001A                   Subjective: Pt reports minimal changes  Continues to have pain with ADLs      Objective: See treatment diary below      Assessment: Tolerated treatment well  Patient demonstrated fatigue post treatment, would benefit from continued PT and pt's pain in plantar fascia is adding to gait deviations  Plan: Continue per plan of care  Progress treatment as tolerated         Precautions: see protocol       Manual         PROM  TERRELL TERRELL TERRELL TERRELL TERRELL TERRELL       DF mobs TERRELL TERRELL TERRELL np TERRELL np       PF STM   TERRELL TERRELL np TERRELL                                     Exercise Diary         Bike 10' - 10' 12' np 10'       Ankle 4 way x30 RTB x30 RTB np x30 RTB np GTB x30    PF-black 2x20       Dorsi mob step x20 x20 x20 np np np       Biodex 3x LOS, 1x WS 3x LOS- L12, 2x WS np np 3' WS np       Step down anterior    x30 4" np np       Walking    HHA 2x20ft 3x50ft np       Heel raises     2x20 2x20                                                                                                                                                                                    Modalities

## 2019-12-30 ENCOUNTER — OFFICE VISIT (OUTPATIENT)
Dept: PHYSICAL THERAPY | Age: 34
End: 2019-12-30
Payer: COMMERCIAL

## 2019-12-30 DIAGNOSIS — S86.001A ACHILLES TENDON INJURY, RIGHT, INITIAL ENCOUNTER: Primary | ICD-10-CM

## 2019-12-30 PROCEDURE — 97140 MANUAL THERAPY 1/> REGIONS: CPT | Performed by: PHYSICAL THERAPIST

## 2019-12-30 PROCEDURE — 97110 THERAPEUTIC EXERCISES: CPT | Performed by: PHYSICAL THERAPIST

## 2019-12-30 NOTE — PROGRESS NOTES
Daily Note     Today's date: 2019  Patient name: Neva Irving  : 1985  MRN: [id number]  Referring provider: Praveen Saldana MD  Dx:   Encounter Diagnosis     ICD-10-CM    1  Achilles tendon injury, right, initial encounter S86 001A                   Subjective: Pt reports decreased pain      Objective: See treatment diary below      Assessment: Tolerated treatment well  Patient demonstrated fatigue post treatment, would benefit from continued PT and pt had decreased swelling throughout session and was able to ambulate with increased dorsiflexion  Plan: Continue per plan of care  Progress treatment as tolerated         Precautions: see protocol       Manual        PROM  TERRELL TERRELL TERRELL TERRELL TERRELL TERRELL TERRELL      DF mobs TERRELL TERRELL TERRELL np TERRELL np np      PF STM   TERRELL TERRELL np TERRELL np                                    Exercise Diary        Bike 10' - 10' 12' np 10' 10'      Ankle 4 way x30 RTB x30 RTB np x30 RTB np GTB x30    PF-black 2x20 np      Dorsi mob step x20 x20 x20 np np np x30      Biodex 3x LOS, 1x WS 3x LOS- L12, 2x WS np np 3' WS np np      Step down anterior    x30 4" np np np      Walking    HHA 2x20ft 3x50ft np 3x50ft      Heel raises     2x20 2x20 2x20      Knee ext       2 plates 1F69      Leg press       30# x20                                                                                                                                                         Modalities

## 2020-01-02 ENCOUNTER — OFFICE VISIT (OUTPATIENT)
Dept: PHYSICAL THERAPY | Age: 35
End: 2020-01-02
Payer: COMMERCIAL

## 2020-01-02 DIAGNOSIS — S86.001A ACHILLES TENDON INJURY, RIGHT, INITIAL ENCOUNTER: Primary | ICD-10-CM

## 2020-01-02 PROCEDURE — 97112 NEUROMUSCULAR REEDUCATION: CPT | Performed by: PHYSICAL THERAPIST

## 2020-01-02 PROCEDURE — 97140 MANUAL THERAPY 1/> REGIONS: CPT | Performed by: PHYSICAL THERAPIST

## 2020-01-02 PROCEDURE — 97110 THERAPEUTIC EXERCISES: CPT | Performed by: PHYSICAL THERAPIST

## 2020-01-02 NOTE — PROGRESS NOTES
Daily Note     Today's date: 2020  Patient name: Renetta Seymour  : 1985  MRN: [id number]  Referring provider: Sadaf Clifford MD  Dx:   Encounter Diagnosis     ICD-10-CM    1  Achilles tendon injury, right, initial encounter S86 001A                   Subjective: Pt reports continuing to need scooter at work at end of day       Objective: See treatment diary below      Assessment: Tolerated treatment well  Patient demonstrated fatigue post treatment, would benefit from continued PT and pt had increased dorsifleixon ROM following dorsiflexion mobilizations  Plan: Continue per plan of care  Progress treatment as tolerated         Precautions: see protocol       Manual   1/2     PROM  TERRELL TERRELL TERRELL TERRELL TERRELL TERRELL TERRELL TERRELL     DF mobs TERRELL TERRELL TERRELL np TERRELL np np TERRELL     PF STM   TERRELL TERRELL np TERRELL np np                                   Exercise Diary   1/2     Bike 10' - 10' 12' np 10' 10' 10'     Ankle 4 way x30 RTB x30 RTB np x30 RTB np GTB x30    PF-black 2x20 np np     Dorsi mob step x20 x20 x20 np np np x30 2x30     Biodex 3x LOS, 1x WS 3x LOS- L12, 2x WS np np 3' WS np np np     Step down anterior    x30 4" np np np np     Walking    HHA 2x20ft 3x50ft np 3x50ft np     Heel raises     2x20 2x20 2x20 2x20     Knee ext       2 plates 8V34 np     Leg press       30# x20 70# x20                                                                                                                                                        Modalities

## 2020-01-07 ENCOUNTER — OFFICE VISIT (OUTPATIENT)
Dept: PHYSICAL THERAPY | Age: 35
End: 2020-01-07
Payer: COMMERCIAL

## 2020-01-07 DIAGNOSIS — S86.001A ACHILLES TENDON INJURY, RIGHT, INITIAL ENCOUNTER: Primary | ICD-10-CM

## 2020-01-07 PROCEDURE — 97110 THERAPEUTIC EXERCISES: CPT | Performed by: PHYSICAL THERAPIST

## 2020-01-07 PROCEDURE — 97112 NEUROMUSCULAR REEDUCATION: CPT | Performed by: PHYSICAL THERAPIST

## 2020-01-07 PROCEDURE — 97140 MANUAL THERAPY 1/> REGIONS: CPT | Performed by: PHYSICAL THERAPIST

## 2020-01-07 NOTE — PROGRESS NOTES
Daily Note     Today's date: 2020  Patient name: Berhane Keane  : 1985  MRN: [id number]  Referring provider: Keenan Encarnacion MD  Dx:   Encounter Diagnosis     ICD-10-CM    1  Achilles tendon injury, right, initial encounter S86 001A                   Subjective: Pt reports pain near incision      Objective: See treatment diary below      Assessment: Tolerated treatment well  Patient demonstrated fatigue post treatment, would benefit from continued PT and displayed increased dorsiflexion following dorsiflexion mobilizations with strap  Plan: Continue per plan of care  Progress treatment as tolerated         Precautions: see protocol       Manual      PROM  TERRELL TERRELL TERRELL TERRELL TERRELL TERRELL TERRELL TERRELL TERRELL    DF mobs TERRELL TERRELL TERRELL np TERRELL np np TERRELL np    PF STM   TERRELL TERRELL np TERRELL np np np                                  Exercise Diary      Bike 10' - 10' 12' np 10' 10' 10' 10'    Ankle 4 way x30 RTB x30 RTB np x30 RTB np GTB x30    PF-black 2x20 np np np    Dorsi mob step x20 x20 x20 np np np x30 2x30 x30    Biodex 3x LOS, 1x WS 3x LOS- L12, 2x WS np np 3' WS np np np WS 10'    Step down anterior    x30 4" np np np np np    Walking    HHA 2x20ft 3x50ft np 3x50ft np np    Heel raises     2x20 2x20 2x20 2x20 x30    Knee ext       2 plates 2Q69 np np    Leg press       30# x20 70# x20 np                                                                                                                                                       Modalities

## 2020-01-09 ENCOUNTER — OFFICE VISIT (OUTPATIENT)
Dept: PHYSICAL THERAPY | Age: 35
End: 2020-01-09
Payer: COMMERCIAL

## 2020-01-09 DIAGNOSIS — S86.001A ACHILLES TENDON INJURY, RIGHT, INITIAL ENCOUNTER: Primary | ICD-10-CM

## 2020-01-09 PROCEDURE — 97110 THERAPEUTIC EXERCISES: CPT | Performed by: PHYSICAL THERAPIST

## 2020-01-09 PROCEDURE — 97112 NEUROMUSCULAR REEDUCATION: CPT | Performed by: PHYSICAL THERAPIST

## 2020-01-09 NOTE — PROGRESS NOTES
Daily Note     Today's date: 2020  Patient name: Yevgeniy Tomlin  : 1985  MRN: [id number]  Referring provider: Zoila Parra MD  Dx:   Encounter Diagnosis     ICD-10-CM    1  Achilles tendon injury, right, initial encounter S86 001A                   Subjective: Pt reports minimal changes      Objective: See treatment diary below      Assessment: Tolerated treatment well  Patient demonstrated fatigue post treatment, would benefit from continued PT and pt continues to have difficulty with weight shifting diagonally on biodex  Plan: Continue per plan of care  Progress treatment as tolerated         Precautions: see protocol       Manual     PROM  TERRELL TERRELL TERRELL TERRELL TERRELL TERRELL TERRELL TERRELL TERRELL np   DF mobs TERRELL TERRELL TERRELL np TERRELL np np TERRELL np np   PF STM   TERRELL TERRELL np TERRELL np np np np                                 Exercise Diary     Bike 10' - 10' 12' np 10' 10' 10' 10' 10'   Ankle 4 way x30 RTB x30 RTB np x30 RTB np GTB x30    PF-black 2x20 np np np    Dorsi mob step x20 x20 x20 np np np x30 2x30 x30 np   Biodex 3x LOS, 1x WS 3x LOS- L12, 2x WS np np 3' WS np np np WS 10' WS 10'   Step down anterior    x30 4" np np np np np np   Walking    HHA 2x20ft 3x50ft np 3x50ft np np np   Heel raises     2x20 2x20 2x20 2x20 x30 x20   Knee ext       2 plates 7N62 np np np   Leg press       30# x20 70# x20 np 70# x20   Foam roll calf          5'                                                                                                                                         Modalities

## 2020-01-14 ENCOUNTER — OFFICE VISIT (OUTPATIENT)
Dept: PHYSICAL THERAPY | Age: 35
End: 2020-01-14
Payer: COMMERCIAL

## 2020-01-14 DIAGNOSIS — S86.001A ACHILLES TENDON INJURY, RIGHT, INITIAL ENCOUNTER: Primary | ICD-10-CM

## 2020-01-14 PROCEDURE — 97112 NEUROMUSCULAR REEDUCATION: CPT | Performed by: PHYSICAL THERAPIST

## 2020-01-14 PROCEDURE — 97110 THERAPEUTIC EXERCISES: CPT | Performed by: PHYSICAL THERAPIST

## 2020-01-14 PROCEDURE — 97140 MANUAL THERAPY 1/> REGIONS: CPT | Performed by: PHYSICAL THERAPIST

## 2020-01-14 NOTE — PROGRESS NOTES
Daily Note     Today's date: 2020  Patient name: Jenaro Meneses  : 1985  MRN: [id number]  Referring provider: Jad Rashid MD  Dx:   Encounter Diagnosis     ICD-10-CM    1  Achilles tendon injury, right, initial encounter S86 001A                   Subjective: Pt reports pain/swelling      Objective: See treatment diary below      Assessment: Tolerated treatment well  Patient demonstrated fatigue post treatment, would benefit from continued PT and pt was able to perform 3x10 calf raises w/o UE for first time during session      Plan: Continue per plan of care  Progress treatment as tolerated         Precautions: see protocol       Manual     PROM  TERRELL         np   DF mobs np         np   PF STM np         np                                 Exercise Diary     Bike 10'         10'   Ankle 4 way np            Dorsi mob step np         np   Biodex np         WS 10'   Step down anterior np         np   Walking np         np   Heel raises 2x10          x20   Knee ext np         np   Leg press np         70# x20   Foam roll calf np         5'   Golf ball-pf 2'                                                                                                                                     Modalities

## 2020-01-16 ENCOUNTER — OFFICE VISIT (OUTPATIENT)
Dept: PHYSICAL THERAPY | Age: 35
End: 2020-01-16
Payer: COMMERCIAL

## 2020-01-16 DIAGNOSIS — S86.001A ACHILLES TENDON INJURY, RIGHT, INITIAL ENCOUNTER: Primary | ICD-10-CM

## 2020-01-16 PROCEDURE — 97140 MANUAL THERAPY 1/> REGIONS: CPT | Performed by: PHYSICAL THERAPIST

## 2020-01-16 PROCEDURE — 97110 THERAPEUTIC EXERCISES: CPT | Performed by: PHYSICAL THERAPIST

## 2020-01-16 PROCEDURE — 97112 NEUROMUSCULAR REEDUCATION: CPT | Performed by: PHYSICAL THERAPIST

## 2020-01-16 NOTE — PROGRESS NOTES
Daily Note     Today's date: 2020  Patient name: Gillian Valel  : 1985  MRN: [id number]  Referring provider: Nelle Bumpers, MD  Dx:   Encounter Diagnosis     ICD-10-CM    1  Achilles tendon injury, right, initial encounter S86 001A                   Subjective: Pt reports pain/soreness      Objective: See treatment diary below      Assessment: Tolerated treatment fair  Patient demonstrated fatigue post treatment, would benefit from continued PT and pt has increased STM following manual therapy  Plan: Continue per plan of care  Progress treatment as tolerated         Precautions: see protocol       Manual     PROM  TERRELL TERRELL        np   DF mobs np np        np   PF STM np np        np   Calf STM  TERRELL                            Exercise Diary     Bike 10' 10'        10'   Ankle 4 way np np           Dorsi mob step np np        np   Biodex np np        WS 10'   Step down anterior np np        np   Walking np np        np   Heel raises 2x10  2x10        x20   Knee ext np np        np   Leg press np np        70# x20   Foam roll calf np 2'        5'   Golf ball-pf 2' 2'                                                                                                                                    Modalities

## 2020-01-20 ENCOUNTER — OFFICE VISIT (OUTPATIENT)
Dept: PHYSICAL THERAPY | Age: 35
End: 2020-01-20
Payer: COMMERCIAL

## 2020-01-20 DIAGNOSIS — S86.001A ACHILLES TENDON INJURY, RIGHT, INITIAL ENCOUNTER: Primary | ICD-10-CM

## 2020-01-20 PROCEDURE — 97110 THERAPEUTIC EXERCISES: CPT | Performed by: PHYSICAL THERAPIST

## 2020-01-20 PROCEDURE — 97140 MANUAL THERAPY 1/> REGIONS: CPT | Performed by: PHYSICAL THERAPIST

## 2020-01-20 PROCEDURE — 97112 NEUROMUSCULAR REEDUCATION: CPT | Performed by: PHYSICAL THERAPIST

## 2020-01-20 NOTE — PROGRESS NOTES
Daily Note     Today's date: 2020  Patient name: Gregg Bowens  : 1985  MRN: [id number]  Referring provider: Ignacio Mohs, MD  Dx:   Encounter Diagnosis     ICD-10-CM    1  Achilles tendon injury, right, initial encounter S86 001A                   Subjective: Pt states she has pain today      Objective: See treatment diary below      Assessment: Tolerated treatment well  Patient demonstrated fatigue post treatment, would benefit from continued PT and pt has pain in R medial foot with WB  Pt is TTP in R navicular and may require imaging of R foot  Referring Ignacia Rodrigues will be contactes  Plan: Continue per plan of care  Progress treatment as tolerated         Precautions: see protocol       Manual     PROM  TERRELL TERRELL TERRELL       np   DF mobs np np TERRELL       np   PF STM np np np       np   Calf STM  TERRELL np                           Exercise Diary     Bike 10' 10' 10'       10'   Ankle 4 way np np np          Dorsi mob step np np np       np   Biodex np np np       WS 10'   Step down anterior np np np       np   Walking np np np       np   Heel raises 2x10  2x10 np       x20   Knee ext np np np       np   Leg press np np np       70# x20   Foam roll calf np 2' np       5'   Golf ball-pf 2' 2' np          Step training   10'                                                                                                                      Modalities

## 2020-01-22 ENCOUNTER — OFFICE VISIT (OUTPATIENT)
Dept: OBGYN CLINIC | Facility: OTHER | Age: 35
End: 2020-01-22
Payer: COMMERCIAL

## 2020-01-22 VITALS
BODY MASS INDEX: 35.55 KG/M2 | WEIGHT: 240 LBS | HEART RATE: 101 BPM | HEIGHT: 69 IN | DIASTOLIC BLOOD PRESSURE: 113 MMHG | SYSTOLIC BLOOD PRESSURE: 153 MMHG

## 2020-01-22 DIAGNOSIS — Z98.890 S/P ACHILLES TENDON REPAIR: ICD-10-CM

## 2020-01-22 DIAGNOSIS — S86.001A ACHILLES TENDON INJURY, RIGHT, INITIAL ENCOUNTER: Primary | ICD-10-CM

## 2020-01-22 PROCEDURE — 99213 OFFICE O/P EST LOW 20 MIN: CPT | Performed by: ORTHOPAEDIC SURGERY

## 2020-01-22 NOTE — PROGRESS NOTES
Orthopaedic Surgery - Office Note  Tyler Hoang (30 y o  female)   : 1985   MRN: [id number]  Encounter Date: 2020    Chief Complaint   Patient presents with    Right Lower Leg - Follow-up       Assessment / Plan  S/P Right Achilles tendon repair performed on 19    · Activity as tolerated  · Avoid painful maneuvers  · WBAT  · Home exercise program reviewed  · Focus on progressing strength  · Focus on progressing ROM   · Mentioned to the patient that if she is concerned for a stress fracture of the midfoot (navicular) an MRI will be warranted to determine this diagnosis but this is not suspected on physical exam  She could also wear the CAM boot while ambulating  She wished to forego MRI at this time and wear CAM boot while at work  Give symptoms 2-3 weeks with this treatment, she was instructed to call office to have MRI of right foot ordered if symptoms persist    · Her continued symptoms are most likely due to her work and the constant ambulation/standing  She is a General surgery resident with [address]  Return in about 3 weeks (around 2020) for Recheck of right achilles  History of Present Illness  Tyler Hoang is a 30 y o  female who presents presents today for a post operative visit for her right ankle  She is now almost 4 months s/p right Achilles Tendon Repair done on 19  Today, patient states that she continues to have intermittent pain swelling about the right Achilles  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  She states that she has wean from her Cam boot since last visit  She states that she is constantly on her feet at work and walking which aggravates her symptoms  She is also noting intermittent pain with ambulation about the right midfoot  Denies numbness and tingling, fever, chills  Review of Systems  Pertinent items are noted in HPI  All other systems were reviewed and are negative      Physical Exam  BP (!) 153/113   Pulse 101   Ht 5' 9" (1 753 m)   Wt 109 kg (240 lb)   BMI 35 44 kg/m²   Cons: Appears well  No apparent distress  Psych: Alert  Oriented x3  Mood and affect normal   Eyes: PERRLA, EOMI  Resp: Normal effort  No audible wheezing or stridor  CV: Palpable pulse  No discernable arrhythmia  No LE edema  Lymph:  No palpable cervical, axillary, or inguinal lymphadenopathy  Skin: Warm  No palpable masses  No visible lesions  Neuro: Normal muscle tone  Normal and symmetric DTR's  Right Foot & Ankle Exam  Alignment:  Normal ankle alignment  Inspection:  Mild swelling  No edema  No erythema  Incision healed  Palpation:  posterior ankle and navicular tenderness  ROM:  Ankle DF 5  Ankle PF 40  Strength:  Anterior tibialis 5/5  Gastroc/Soleus 4+/5  Stability:  No objective ankle instablity  (-) Anterior  neutral and PF  (-) Talar Tilt  Tests:  No pertinent positive or negative tests  Neurovascular:  Sensation intact in DP/SP/Crews/Sa/T nerve distributions  2+ DP & PT pulses  Gait:  Antalgic  Studies Reviewed  No studies to review    Procedures  No procedures today  Medical, Surgical, Family, and Social History  The patient's medical history, family history, and social history, were reviewed and updated as appropriate  Past Medical History:   Diagnosis Date    Chronic sinus infection     Depression     Post-op pain        Past Surgical History:   Procedure Laterality Date    ESOPHAGOGASTRODUODENOSCOPY N/A 8/15/2018    Procedure: ESOPHAGOGASTRODUODENOSCOPY (EGD); Surgeon: Shira Reece MD;  Location:  GI LAB;   Service: Gastroenterology    HAND SURGERY Right     KNEE ARTHROSCOPY Left     lateral meniscus tear, ACL tear    KNEE SURGERY Left     CA REPAIR ACHILLES TENDON,PRIMARY Right 9/30/2019    Procedure: REPAIR TENDON ACHILLES;  Surgeon: Rhonda Pickett MD;  [facility];  Service: Orthopedics    UPPER GASTROINTESTINAL ENDOSCOPY         Family History   Problem Relation Age of Onset    Depression Mother     Hypertension Mother     Diabetes Father     Hypertension Father     Cancer Father     Heart failure Father     Leukemia Father     No Known Problems Sister     No Known Problems Brother     Diabetes Paternal Grandmother     Hypertension Paternal Grandmother     Alcohol abuse Maternal Uncle     Depression Maternal Uncle     Mental illness Maternal Uncle     Alcohol abuse Cousin     Depression Cousin     Mental illness Cousin        Social History     Occupational History    Not on file   Tobacco Use    Smoking status: Never Smoker    Smokeless tobacco: Never Used   Substance and Sexual Activity    Alcohol use: Yes     Comment: social    Drug use: No    Sexual activity: Not on file       No Known Allergies      Current Outpatient Medications:     aspirin (ECOTRIN) 325 mg EC tablet, Take 1 tablet (325 mg total) by mouth 2 (two) times a day for 14 days, Disp: 28 tablet, Rfl: 0    fexofenadine (ALLEGRA) 180 MG tablet, Take 1 tablet (180 mg total) by mouth daily (Patient not taking: Reported on 1/22/2020), Disp: , Rfl: 0    ibuprofen (MOTRIN) 800 mg tablet, Take by mouth every 6 (six) hours as needed for mild pain, Disp: , Rfl:     levonorgestrel (MIRENA) 20 MCG/24HR IUD, 1 Intra Uterine Device by Intrauterine route once for 1 dose (Patient taking differently: 1 each by Intrauterine route once ), Disp: 1 each, Rfl: 0    mometasone (NASONEX) 50 mcg/act nasal spray, 2 sprays into each nostril daily (Patient not taking: Reported on 12/11/2019), Disp: 17 g, Rfl: 5    ondansetron (ZOFRAN-ODT) 4 mg disintegrating tablet, Take 1 tablet (4 mg total) by mouth every 8 (eight) hours as needed for nausea or vomiting (Patient not taking: Reported on 12/11/2019), Disp: 15 tablet, Rfl: 0      Immanuel Bahena    Scribe Attestation    I,:   Denae Coffey am acting as a scribe while in the presence of the attending physician :        I,:   Marisabel Conn MD personally performed the services described in this documentation    as scribed in my presence :

## 2020-01-24 ENCOUNTER — TELEPHONE (OUTPATIENT)
Dept: OBGYN CLINIC | Facility: HOSPITAL | Age: 35
End: 2020-01-24

## 2020-01-24 ENCOUNTER — OFFICE VISIT (OUTPATIENT)
Dept: PHYSICAL THERAPY | Age: 35
End: 2020-01-24
Payer: COMMERCIAL

## 2020-01-24 DIAGNOSIS — S86.001A ACHILLES TENDON INJURY, RIGHT, INITIAL ENCOUNTER: Primary | ICD-10-CM

## 2020-01-24 DIAGNOSIS — M84.374A STRESS FRACTURE, RIGHT FOOT, INITIAL ENCOUNTER FOR FRACTURE: ICD-10-CM

## 2020-01-24 PROCEDURE — 97140 MANUAL THERAPY 1/> REGIONS: CPT | Performed by: PHYSICAL THERAPIST

## 2020-01-24 PROCEDURE — 97110 THERAPEUTIC EXERCISES: CPT | Performed by: PHYSICAL THERAPIST

## 2020-01-24 PROCEDURE — 97112 NEUROMUSCULAR REEDUCATION: CPT | Performed by: PHYSICAL THERAPIST

## 2020-01-24 NOTE — PROGRESS NOTES
Daily Note     Today's date: 2020  Patient name: Cassy Bertrand  : 1985  MRN: [id number]  Referring provider: Yajaira Ruth MD  Dx:   Encounter Diagnosis     ICD-10-CM    1  Achilles tendon injury, right, initial encounter S86 001A                   Subjective: Pt reports continued R midfoot pain      Objective: See treatment diary below      Assessment: Tolerated treatment well  Patient demonstrated fatigue post treatment, would benefit from continued PT and tolerated exercises well, but had midfoot pain with WB activity  Plan: Continue per plan of care  Progress treatment as tolerated         Precautions: see protocol       Manual     PROM  TERRELL TERRELL TERRELL TERRELL      np   DF mobs np np TERRELL TERRELL      np   PF STM np np np np      np   Calf STM  TERRELL np np                          Exercise Diary     Bike 10' 10' 10' 10'      10'   Ankle 4 way np np np np         Dorsi mob step np np np 27'      np   Biodex np np np np      WS 10'   Step down anterior np np np np      np   Walking np np np np      np   Heel raises 2x10  2x10 np np      x20   Knee ext np np np np      np   Leg press np np np np      70# x20   Foam roll calf np 2' np np      5'   Golf ball-pf 2' 2' np np         Step training   10' np                                                                                                                     Modalities

## 2020-02-03 ENCOUNTER — HOSPITAL ENCOUNTER (OUTPATIENT)
Dept: RADIOLOGY | Facility: HOSPITAL | Age: 35
Discharge: HOME | End: 2020-02-03
Payer: COMMERCIAL

## 2020-02-03 DIAGNOSIS — S86.001A ACHILLES TENDON INJURY, RIGHT, INITIAL ENCOUNTER: ICD-10-CM

## 2020-02-03 PROCEDURE — 73718 MRI LOWER EXTREMITY W/O DYE: CPT

## 2020-02-10 ENCOUNTER — OFFICE VISIT (OUTPATIENT)
Dept: PHYSICAL THERAPY | Age: 35
End: 2020-02-10
Payer: COMMERCIAL

## 2020-02-10 DIAGNOSIS — S86.001A ACHILLES TENDON INJURY, RIGHT, INITIAL ENCOUNTER: Primary | ICD-10-CM

## 2020-02-10 PROCEDURE — 97140 MANUAL THERAPY 1/> REGIONS: CPT | Performed by: PHYSICAL THERAPIST

## 2020-02-10 PROCEDURE — 97110 THERAPEUTIC EXERCISES: CPT | Performed by: PHYSICAL THERAPIST

## 2020-02-10 NOTE — PROGRESS NOTES
Daily Note     Today's date: 2/10/2020  Patient name: Farshad Puckett  : 1985  MRN: [id number]  Referring provider: Sohail Coker MD  Dx:   Encounter Diagnosis     ICD-10-CM    1  Achilles tendon injury, right, initial encounter S86 001A               Pt was referred to and evaluated by Leo Triplett with goal of decreasing swelling in LE  Pt will be re-evaluated when needed  Subjective: Pt reports minimal changes    Objective: See treatment diary below      Assessment: Tolerated treatment well  Patient demonstrated fatigue post treatment, would benefit from continued PT and pt continues to be limited in calf strength  Plan: Continue per plan of care  Progress treatment as tolerated         Precautions: see protocol       Manual  1/14 1/16 1/20 1/24 2/10     1/9   PROM  TERRELL TERRELL TERRELL TERRELL TERRELL     np   DF mobs np np TERRELL TERRELL TERRELL     np   PF STM np np np np np     np   Calf STM  TERRELL np np np             np            Exercise Diary  1/14 1/16 1/20 1/24 2/10     1/9   Bike 10' 10' 10' 10' 10'     10'   Ankle 4 way np np np np np        Dorsi mob step np np np 27' np     np   Biodex np np np np np     WS 10'   Step down anterior np np np np np     np   Walking np np np np np     np   Heel raises 2x10  2x10 np np 30x      x20   Knee ext np np np np np     np   Leg press np np np np np     70# x20   Foam roll calf np 2' np np np     5'   Golf ball-pf 2' 2' np np np        Step training   10' np np                                                                                                                    Modalities

## 2020-02-12 ENCOUNTER — OFFICE VISIT (OUTPATIENT)
Dept: OBGYN CLINIC | Facility: OTHER | Age: 35
End: 2020-02-12
Payer: COMMERCIAL

## 2020-02-12 VITALS
WEIGHT: 240 LBS | HEART RATE: 84 BPM | DIASTOLIC BLOOD PRESSURE: 99 MMHG | SYSTOLIC BLOOD PRESSURE: 153 MMHG | HEIGHT: 69 IN | BODY MASS INDEX: 35.55 KG/M2

## 2020-02-12 DIAGNOSIS — S86.001A ACHILLES TENDON INJURY, RIGHT, INITIAL ENCOUNTER: Primary | ICD-10-CM

## 2020-02-12 DIAGNOSIS — Z98.890 S/P ACHILLES TENDON REPAIR: ICD-10-CM

## 2020-02-12 PROCEDURE — 99213 OFFICE O/P EST LOW 20 MIN: CPT | Performed by: ORTHOPAEDIC SURGERY

## 2020-02-12 PROCEDURE — 1036F TOBACCO NON-USER: CPT | Performed by: ORTHOPAEDIC SURGERY

## 2020-02-12 PROCEDURE — 3008F BODY MASS INDEX DOCD: CPT | Performed by: ORTHOPAEDIC SURGERY

## 2020-02-12 RX ORDER — MELOXICAM 15 MG/1
15 TABLET ORAL DAILY
Qty: 30 TABLET | Refills: 0 | Status: SHIPPED | OUTPATIENT
Start: 2020-02-12 | End: 2020-12-08 | Stop reason: ALTCHOICE

## 2020-02-12 NOTE — LETTER
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 than would be expected for this point in her rehabilitation  As a result, she is experiencing persistent ankle swelling and discomfort  She would benefit from a period of rest, if possible, to allow the ankle to calm down  Because this is difficult to do in her position, I am trying to accomplish this with continued use of the CAM boot at work  I have also advised her to use the scooter as much as possible until the ankle calms down  I am expecting that she will require the use of the CAM boot at work through 7-9 months postop, given the high demands of her job  Full recovery may take one year  Please do not hesitate to contact me if you have any questions        Sincerely,      Liset Martel MD

## 2020-02-12 NOTE — PROGRESS NOTES
Orthopaedic Surgery - Office Note  Gillian Valle (86 y o  female)   : 1985   MRN: [id number]  Encounter Date: 2020    Chief Complaint   Patient presents with    Right Lower Leg - Follow-up       Assessment / Plan  S/P Right Achilles tendon repair performed on 19    · Activity as tolerated  · Avoid painful maneuvers  · WBAT  · Home exercise program reviewed  · Focus on progressing strength  · Prescribed Mobic once daily as well as charity stockings   No follow-ups on file  History of Present Illness  Gillian Valle is a 30 y o  female who presents today for a post operative visit for her right ankle  She is now 4 5 months s/p right achilles tendon repair done on 19  Today, patient notes that her symptoms remain about the same, mildly improved both in the mid foot and posterior ankle as they were from her last visit  She uses the CAM boot while at work due to the amount of steps she has to take during the day with mild benefit  She works as a general surgery resident  Denies numbness and tingling, fevers or chills  Review of Systems  Pertinent items are noted in HPI  All other systems were reviewed and are negative  Physical Exam  /99   Pulse 84   Ht 5' 9" (1 753 m)   Wt 109 kg (240 lb)   BMI 35 44 kg/m²   Cons: Appears well  No apparent distress  Psych: Alert  Oriented x3  Mood and affect normal   Eyes: PERRLA, EOMI  Resp: Normal effort  No audible wheezing or stridor  CV: Palpable pulse  No discernable arrhythmia  No LE edema  Lymph:  No palpable cervical, axillary, or inguinal lymphadenopathy  Skin: Warm  No palpable masses  No visible lesions  Neuro: Normal muscle tone  Normal and symmetric DTR's  Right Foot & Ankle Exam  Alignment:  Normal ankle alignment  Inspection:  No edema  No erythema  No ecchymosis  Incision healed  Palpation:  Mild tenderness at posterior ankle and midfoot  ROM:  Ankle DF 5  Ankle PF 45  Strength:  Anterior tibialis 5/5  Gastroc/Soleus 4+/5  Stability:  No objective ankle instablity  (-) Anterior  neutral and PF  (-) Talar Tilt  Tests:  No pertinent positive or negative tests  Neurovascular:  Sensation intact in DP/SP/Crews/Sa/T nerve distributions  2+ DP & PT pulses  Gait:  Antalgic  Studies Reviewed  I have personally reviewed pertinent films in PACS  MRI of right foot - No evidence of stress fracture or other significant osseous abnormality    Procedures  No procedures today  Medical, Surgical, Family, and Social History  The patient's medical history, family history, and social history, were reviewed and updated as appropriate  Past Medical History:   Diagnosis Date    Chronic sinus infection     Depression     Post-op pain        Past Surgical History:   Procedure Laterality Date    ESOPHAGOGASTRODUODENOSCOPY N/A 8/15/2018    Procedure: ESOPHAGOGASTRODUODENOSCOPY (EGD); Surgeon: Danii Salamanca MD;  Location:  GI LAB;   Service: Gastroenterology    HAND SURGERY Right     KNEE ARTHROSCOPY Left     lateral meniscus tear, ACL tear    KNEE SURGERY Left     LA REPAIR ACHILLES TENDON,PRIMARY Right 9/30/2019    Procedure: REPAIR TENDON ACHILLES;  Surgeon: Radha Robles MD;  Location: [facility];  Service: Orthopedics    UPPER GASTROINTESTINAL ENDOSCOPY         Family History   Problem Relation Age of Onset    Depression Mother     Hypertension Mother     Diabetes Father     Hypertension Father     Cancer Father     Heart failure Father     Leukemia Father     No Known Problems Sister     No Known Problems Brother     Diabetes Paternal Grandmother     Hypertension Paternal Grandmother     Alcohol abuse Maternal Uncle     Depression Maternal Uncle     Mental illness Maternal Uncle     Alcohol abuse Cousin     Depression Cousin     Mental illness Cousin        Social History     Occupational History    Not on file   Tobacco Use    Smoking status: Never Smoker    Smokeless tobacco: Never Used   Substance and Sexual Activity    Alcohol use: Yes     Comment: social    Drug use: No    Sexual activity: Not on file       No Known Allergies      Current Outpatient Medications:     fexofenadine (ALLEGRA) 180 MG tablet, Take 1 tablet (180 mg total) by mouth daily, Disp: , Rfl: 0    ibuprofen (MOTRIN) 800 mg tablet, Take by mouth every 6 (six) hours as needed for mild pain, Disp: , Rfl:     mometasone (NASONEX) 50 mcg/act nasal spray, 2 sprays into each nostril daily, Disp: 17 g, Rfl: 5    ondansetron (ZOFRAN-ODT) 4 mg disintegrating tablet, Take 1 tablet (4 mg total) by mouth every 8 (eight) hours as needed for nausea or vomiting, Disp: 15 tablet, Rfl: 0    aspirin (ECOTRIN) 325 mg EC tablet, Take 1 tablet (325 mg total) by mouth 2 (two) times a day for 14 days, Disp: 28 tablet, Rfl: 0    levonorgestrel (MIRENA) 20 MCG/24HR IUD, 1 Intra Uterine Device by Intrauterine route once for 1 dose (Patient taking differently: 1 each by Intrauterine route once ), Disp: 1 each, Rfl: 0      Immanuel Bahena    Scribe Attestation    I,:   Naty Vu am acting as a scribe while in the presence of the attending physician :        I,:   Radha Sauer MD personally performed the services described in this documentation    as scribed in my presence :

## 2020-02-13 ENCOUNTER — EVALUATION (OUTPATIENT)
Dept: PHYSICAL THERAPY | Age: 35
End: 2020-02-13
Payer: COMMERCIAL

## 2020-02-13 DIAGNOSIS — Z98.890 S/P ACHILLES TENDON REPAIR: Primary | ICD-10-CM

## 2020-02-13 DIAGNOSIS — I89.0 LYMPHEDEMA: ICD-10-CM

## 2020-02-13 PROCEDURE — 97110 THERAPEUTIC EXERCISES: CPT

## 2020-02-13 PROCEDURE — 97162 PT EVAL MOD COMPLEX 30 MIN: CPT

## 2020-02-14 NOTE — PROGRESS NOTES
PT Evaluation     Today's date: 2020  Patient name: Shreya Gallardo  : 1985  MRN: [id number]  Referring provider: Rocael Webb MD  Dx:   Encounter Diagnosis     ICD-10-CM    1  S/P Achilles tendon repair Z98 890    2   Lymphedema I89 0      +2 right le dorsal foot and ankle  Left le Right le reev left  Right     mtp 23cm 22 5      Lat malleolus 25 25 5      4cm prox to lat mal 21 5 22 5      8 22 5 23      12 25 5 25      16 28 5 27      20 32 30      24 35 33 5      28 38 36 5      32 40cm 38cm      36        40        44        48        52        56        Figure 8 52cm 54cm      Excessive keloid formation at scar site on right                     Assessment  Impairments: abnormal gait, abnormal or restricted ROM, activity intolerance, impaired balance, impaired physical strength, pain with function and weight-bearing intolerance  Other impairment: plantar flexion weakness,  +2 pitting edema right foot and ankle , excessive scar tissue formation at surgical site  Functional limitations: decreased tolerance to prolonged standing, walking , stairclimbing Understanding of Dx/Px/POC: good   Prognosis: good    Plan  Patient would benefit from: PT eval, lymphedema eval and skilled physical therapy  Referral necessary: No  Planned modality interventions: TENS and cryotherapy  Other planned modality interventions: modalities as needed  Planned therapy interventions: manual therapy, joint mobilization, massage, aquatic therapy, neuromuscular re-education, muscle pump exercises, strengthening, stretching, therapeutic activities, therapeutic exercise, home exercise program and compression  Other planned therapy interventions: trial vasopneumatic compression pump with right le elevation prn, kinesiotaping trial, fit for compression knee high 20-30 mmHG right le  Frequency: 2x week  Duration in weeks: 8  Treatment plan discussed with: patient        Subjective Evaluation    History of Present Illness  Date of surgery: 19 right achilles tendon repair  Not a recurrent problem   Quality of life: good    Pain  Current pain ratin  At best pain ratin  At worst pain ratin  Location: right mid foot radiating to forefoot,  1st hallux pain with extension  Quality: sharp, dull ache and tight  Relieving factors: ice, rest and change in position  Aggravating factors: stair climbing, walking, standing and running  Progression: improved    Social Support  Steps to enter house: yes (1 step down no railing )  Stairs in house: yes (2 steps )   Lives in: multiple-level home  Lives with: alone    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[address])  Exercise history: enjoys running, playing basketball     Treatments  Previous treatment: physical therapy  Current treatment: physical therapy  Patient Goals  Patient goals for therapy: decreased edema, decreased pain, increased strength, increased motion, independence with ADLs/IADLs and return to sport/leisure activities  Patient goal: achieve nonantalgic gait in 4-6 weeks         Objective           Precautions: no known allergies, PMH: seasonal allergic rhinitis, right hand surgery, left knee acl and lateral meniscus tear and repair, right achilles tendon rupture and repair 19, depression       Manual  20             I ladonna            Mld massage and soft tissue mobilization right ankle and foot             jt mobs right forefoot             Pt to bring in her orthotics for assessment              Fit for compression knee high right le               Aquatic exer 1 x week , mat exer 1 x week  Pt does not know how to swim!!!   Exercise Diary              Water walking 5 laps              Long sit heelcord stretch with strap             Hamstring stretch             Ankle pumps, laq, hip flexion             Standing heelraises, hip abd, flex/ext, knee flexion             Flutter kicks at [employer] or [place]             Marching in place progress to jogging             1/2 jj,             Cross country             Step ups              Sidestepping across pool             Backwards walking laps             Runners stretch in standing              Have foot by jet while on bench             Toe flexion with ankle dorsiflexion right             mre's right ankle all planes              3 position heelraises ll bars 10 rep each            Leg press              Toe press             Step upswith 2 rails                 Modalities

## 2020-02-17 ENCOUNTER — OFFICE VISIT (OUTPATIENT)
Dept: PHYSICAL THERAPY | Age: 35
End: 2020-02-17
Payer: COMMERCIAL

## 2020-02-17 DIAGNOSIS — I89.0 LYMPHEDEMA: ICD-10-CM

## 2020-02-17 DIAGNOSIS — Z98.890 HX OF ACHILLES TENDON REPAIR: Primary | ICD-10-CM

## 2020-02-17 PROCEDURE — 97113 AQUATIC THERAPY/EXERCISES: CPT

## 2020-02-17 NOTE — PROGRESS NOTES
Daily Note     Today's date: 2020  Patient name: Ry Hager  : 1985  MRN: [id number]  Referring provider: Irma Lorenzo MD  Dx:   Encounter Diagnosis     ICD-10-CM    1  Hx of Achilles tendon repair Z98 890    2  Lymphedema I89 0                   Subjective: no new c/o's given silicone sheeting today for scar  Objective: See treatment diary below      Assessment: Tolerated treatment well  Patient would benefit from continued PT  Aquatic exer begun       Plan: Continue per plan of care  Precautions: no known allergies, PMH: seasonal allergic rhinitis, right hand surgery, left knee acl and lateral meniscus tear and repair, right achilles tendon rupture and repair 19, depression       Manual  20            I eval            Mld massage and soft tissue mobilization right ankle and foot             jt mobs right forefoot             Pt to bring in her orthotics for assessment              Fit for compression knee high right le               Aquatic exer 1 x week , mat exer 1 x week  Pt does not know how to swim!!!   Exercise Diary              Water walking 5 laps  5            Long sit heelcord stretch with strap             Hamstring stretch 5 hold 10 sec             Ankle pumps, laq, hip flexion 30            Standing heelraises, hip abd, flex/ext, knee flexion 3 x 10            Flutter kicks at [place] or [employer]             Marching in place progress to jogging 20       reps       1/2 jj, 20            Cross country 20            Step ups  2 x 10            Sidestepping across pool 5 laps             Backwards walking laps             Runners stretch in standing              Have foot by jet while on bench 5 min with self massage            Toe flexion with ankle dorsiflexion right             mre's right ankle all planes              3 position heelraises ll bars 10 rep each            Leg press              Toe press             Step upswith 2 rails Modalities

## 2020-02-24 ENCOUNTER — OFFICE VISIT (OUTPATIENT)
Dept: PHYSICAL THERAPY | Age: 35
End: 2020-02-24
Payer: COMMERCIAL

## 2020-02-24 DIAGNOSIS — Z98.890 H/O ACHILLES TENDON REPAIR: Primary | ICD-10-CM

## 2020-02-24 PROCEDURE — 97113 AQUATIC THERAPY/EXERCISES: CPT

## 2020-02-25 NOTE — PROGRESS NOTES
Daily Note     Today's date: 2020  Patient name: Neva Irving  : 1985  MRN: [id number]  Referring provider: Praveen Saldana MD  Dx:   Encounter Diagnosis     ICD-10-CM    1  H/O Achilles tendon repair Z98 [id number]                   Subjective:  I go to ICU rotation  Objective: See treatment diary below      Assessment: Tolerated treatment well  Patient would benefit from continued PT      Plan: Continue per plan of care  Precautions: no known allergies, PMH: seasonal allergic rhinitis, right hand surgery, left knee acl and lateral meniscus tear and repair, right achilles tendon rupture and repair 19, depression       Manual  20           I eval            Mld massage and soft tissue mobilization right ankle and foot             jt mobs right forefoot             Pt to bring in her orthotics for assessment              Fit for compression knee high right le               Aquatic exer 1 x week , mat exer 1 x week  Pt does not know how to swim!!!   Exercise Diary             Water walking 5 laps  5 5 laps           Long sit heelcord stretch with strap  1 min x 1           Hamstring stretch 5 hold 10 sec  X 5 hold 10 sec           Ankle pumps, laq, hip flexion 30 3 x 10           Standing heelraises, hip abd, flex/ext, knee flexion 3 x 10 3 x 10           Flutter kicks at [facility]  5 min            Marching in place progress to jogging 20 jogging 2 min       reps       1/2 jj, 20 30           Cross country 20 30           Step ups  2 x 10            Sidestepping across pool 5 laps  5 laps           Backwards walking laps  3 laps            Runners stretch in standing              Have foot by jet while on bench 5 min with self massage            Toe flexion with ankle dorsiflexion right             mre's right ankle all planes              3 position heelraises ll bars 10 rep each            Leg press              Toe press             Step upswith 2 rails                 Modalities

## 2020-03-02 ENCOUNTER — APPOINTMENT (OUTPATIENT)
Dept: PHYSICAL THERAPY | Age: 35
End: 2020-03-02
Payer: COMMERCIAL

## 2020-03-03 ENCOUNTER — APPOINTMENT (OUTPATIENT)
Dept: PHYSICAL THERAPY | Age: 35
End: 2020-03-03
Payer: COMMERCIAL

## 2020-03-04 ENCOUNTER — APPOINTMENT (OUTPATIENT)
Dept: PHYSICAL THERAPY | Age: 35
End: 2020-03-04
Payer: COMMERCIAL

## 2020-03-09 ENCOUNTER — OFFICE VISIT (OUTPATIENT)
Dept: PHYSICAL THERAPY | Age: 35
End: 2020-03-09
Payer: COMMERCIAL

## 2020-03-09 DIAGNOSIS — Z98.890 H/O ACHILLES TENDON REPAIR: ICD-10-CM

## 2020-03-09 DIAGNOSIS — I89.0 LYMPHEDEMA: Primary | ICD-10-CM

## 2020-03-09 PROCEDURE — 97110 THERAPEUTIC EXERCISES: CPT

## 2020-03-09 PROCEDURE — 97140 MANUAL THERAPY 1/> REGIONS: CPT

## 2020-03-10 NOTE — PROGRESS NOTES
Daily Note     Today's date: 3/9/2020  Patient name: Yevgeniy Tomlin  : 1985  MRN: [id number]  Referring provider: Zoila Parra MD  Dx:   Encounter Diagnosis     ICD-10-CM    1  Lymphedema I89 0    2  H/O Achilles tendon repair [id number]                   Subjective: "I didn't work today  " Pt with noted decreased in right ankle girth reduction  Objective: See treatment diary below      Assessment: Tolerated treatment well  Patient would benefit from continued PT      Plan: Continue per plan of care  Precautions: no known allergies, PMH: seasonal allergic rhinitis, right hand surgery, left knee acl and lateral meniscus tear and repair, right achilles tendon rupture and repair 19, depression       Manual  2/13/20 2/17 2/24 3/9          I eval            Mld massage and soft tissue mobilization right ankle and foot    Man 30 min         jt mobs right forefoot    man         Pt to bring in her orthotics for assessment              Fit for compression knee high right le               Aquatic exer 1 x week , mat exer 1 x week  Pt does not know how to swim!!!   Exercise Diary  2/17 2/24 3/9          Water walking 5 laps  5 5 laps           Long sit heelcord stretch with strap  1 min x 1           Hamstring stretch 5 hold 10 sec  X 5 hold 10 sec           Ankle pumps, laq, hip flexion 30 3 x 10           Standing heelraises, hip abd, flex/ext, knee flexion 3 x 10 3 x 10           Flutter kicks at [facility] or [facility]  5 min            Marching in place progress to jogging 20 jogging 2 min       reps       1/2 jj, 20 30           Cross country 20 30           Step ups  2 x 10            Sidestepping across pool 5 laps  5 laps           Backwards walking laps  3 laps            Runners stretch in standing              Have foot by jet while on bench 5 min with self massage            Toe flexion with ankle dorsiflexion right             mre's right ankle all planes   3 x 10 man           3 position heelraises ll bars 10 rep each  2 x 10 , single heelraise 1 set of 10          Leg press    90 lbs 2 x 10 , 50 lb 2 x 10, toe press           Toe press   50 lb 2 x 10          Step upswith 2 rails   baps level 3 10 reps df/pf, inv/ever, ccw and cw circles              Modalities

## 2020-03-12 ENCOUNTER — OFFICE VISIT (OUTPATIENT)
Dept: PHYSICAL THERAPY | Age: 35
End: 2020-03-12
Payer: COMMERCIAL

## 2020-03-12 DIAGNOSIS — Z98.890 S/P ACHILLES TENDON REPAIR: ICD-10-CM

## 2020-03-12 DIAGNOSIS — I89.0 LYMPHEDEMA: Primary | ICD-10-CM

## 2020-03-12 PROCEDURE — 97113 AQUATIC THERAPY/EXERCISES: CPT

## 2020-03-13 NOTE — PROGRESS NOTES
Daily Note     Today's date: 3/12/2020  Patient name: Mariana Cline  : 1985  MRN: [id number]  Referring provider: Shelbi Aly MD  Dx:   Encounter Diagnosis     ICD-10-CM    1  Lymphedema I89 0    2  S/P Achilles tendon repair [id number]                   Subjective: "This rotation is not as tough on my ankle  I'm a little sore today  "      Objective: See treatment diary below      Assessment: Tolerated treatment well  Patient would benefit from continued PT      Plan: Continue per plan of care  Precautions: no known allergies, PMH: seasonal allergic rhinitis, right hand surgery, left knee acl and lateral meniscus tear and repair, right achilles tendon rupture and repair 19, depression       Manual  2/13/20 2/17 2/24 3/9          I eval            Mld massage and soft tissue mobilization right ankle and foot    Man 30 min         jt mobs right forefoot    man         Pt to bring in her orthotics for assessment              Fit for compression knee high right le               Aquatic exer 1 x week , mat exer 1 x week  Pt does not know how to swim!!!   Exercise Diary  2/17 2/24 3/9 3/12         Water walking 5 laps  5 5 laps  5 laps fwds and backwards         Long sit heelcord stretch with strap  1 min x 1  2 min x 1         Hamstring stretch 5 hold 10 sec  X 5 hold 10 sec  5 reps 10 sec hold          Ankle pumps, laq, hip flexion 30 3 x 10  3 x 10         Standing heelraises, hip abd, flex/ext, knee flexion 3 x 10 3 x 10  3 x 10         Flutter kicks at [facility] or [facility]  5 min   5 min         Marching in place progress to jogging 20 jogging 2 min   2 min each    reps       1/2 jj, 20 30  2 min          Cross country 20 30  2 min         Step ups  2 x 10   3 x 10         Sidestepping across pool 5 laps  5 laps  5 laps         Backwards walking laps  3 laps   perf          Runners stretch in standing              Have foot by jet while on bench 5 min with self massage            Toe flexion with ankle dorsiflexion right             mre's right ankle all planes   3 x 10 man           3 position heelraises ll bars 10 rep each  2 x 10 , single heelraise 1 set of 10          Leg press    90 lbs 2 x 10 , 50 lb 2 x 10, toe press           Toe press   50 lb 2 x 10          Step upswith 2 rails   baps level 3 10 reps df/pf, inv/ever, ccw and cw circles              Modalities

## 2020-03-17 ENCOUNTER — OFFICE VISIT (OUTPATIENT)
Dept: PHYSICAL THERAPY | Age: 35
End: 2020-03-17
Payer: COMMERCIAL

## 2020-03-17 DIAGNOSIS — Z98.890 STATUS POST ACHILLES TENDON REPAIR: Primary | ICD-10-CM

## 2020-03-17 PROCEDURE — 97140 MANUAL THERAPY 1/> REGIONS: CPT

## 2020-03-17 PROCEDURE — 97110 THERAPEUTIC EXERCISES: CPT

## 2020-03-18 NOTE — PROGRESS NOTES
PT Re-Evaluation     Today's date: 3/17/2020  Patient name: Vikram Bajwa  : 1985  MRN: [id number]  Referring provider: Stephanie Rodriguez MD  Dx:   Encounter Diagnosis     ICD-10-CM    1  Status post Achilles tendon repair Z98 [id number]      +2 right le dorsal foot and ankle  Left le Right le reev left  Right      mtp 23cm 22 5         Lat malleolus 25 25 5         4cm prox to lat mal 21 5 22 5         8 22 5 23         12 25 5 25         16 28 5 27         20 32 30         24 35 33 5         28 38 36 5         32 40cm 38cm         36             40             44             48             52             56             Figure 8 52cm 54cm         Excessive keloid formation at scar site on right                             Assessment  Assessment details: The pt has made steady progress in right le girth reduction, pain reduction and improving right le strength and scar flattening  Emphasis of PT now to return to strength training, closed kinetic chain activities, and progression to plyometric exer without increased c/o pain  Recommend use of silicone sheeting for further scar flattening, and use of compression knee high 15-20mmhg  ( 4 refills )  PT in progress to cutting and turning drills and eventual return to basketball play   She is recommended to obtain custom orthotics to improve foot ankle alignment and reduce bilateral foot pronation  Aquatic exer had proven helpful for edema reduction with use of hydrostatic pressure improving lymphatic circulation and use of reduced load on ankle joint allowing for increased strengthening without increased pain  The pt shall now return to her primary care PT for continued orthopedic rehabilitation   Custom orthotics are also recommended at this time   Impairments: activity intolerance, impaired balance, impaired physical strength, pain with function and weight-bearing intolerance  Functional limitations: decreased tolerance to prolonged weight bearing, repetitive ankle exer,  Symptom irritability: moderateUnderstanding of Dx/Px/POC: good   Prognosis: good    Goals  stg achieve pt to progress out of right le boot in 2-3 weeks with daily activity   reduction of pain to level 1 with daily activity in 2 weeks  ltg  Achieve 4+/5 right ankle plantar flexion in 4 weeks  Pt to tolerance plyometric exer in 4 weeks without pain  Increase gait speed with no gait deviation and achieve Tug  7 second    Plan  Patient would benefit from: PT eval and skilled physical therapy  Referral necessary: Yes  Planned modality interventions: cryotherapy  Other planned modality interventions: prn  Planned therapy interventions: manual therapy, neuromuscular re-education, balance, aquatic therapy, strengthening, stretching, therapeutic activities, therapeutic exercise, home exercise program and gait training  Frequency: 2x week  Duration in weeks: 8  Treatment plan discussed with: patient        Subjective Evaluation    History of Present Illness  Mechanism of injury: The 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 is not as demanding on her right le with pain now rated as a level 2 at the end of her day  The pt is still wearing her boot all day on the right le per her physician recommendation  Noted decrease in right le edema and noted reduction in scar formation with use of silicone sheeting  Recommend to continue PT with emphasis now on strength training, plyometric activity, continued closed kinetic chain activities  The pt has been given the information to be fitted for custom orthotics by Avis Saenz /  at the [address] [place] [address] which is not covered by [facility]   The pt has benefited from use of her orthotics in the past and is interested in obtaining but is pausing due to cost            Not a recurrent problem   Quality of life: good    Pain  Current pain ratin  At best pain ratin  At worst pain rating: 3  Location: right achilles tendon   no longer midfoot pain  Quality: dull ache  Relieving factors: ice, rest and change in position  Aggravating factors: walking and lifting (ankle repetitive activity)  Progression: improved    Treatments  Current treatment: physical therapy  Patient Goals  Patient goals for therapy: decreased edema, decreased pain, increased strength, independence with ADLs/IADLs and return to sport/leisure activities  Patient goal: reduction of right le edema by 2 cm met and reduction of pain by 50 percent met        Objective     Active Range of Motion   Left Ankle/Foot   Dorsiflexion (ke): WFL    Right Ankle/Foot   Dorsiflexion (ke): 15 degrees   Plantar flexion: [facility]    Additional Active Range of Motion Details  Prom 20 dorsiflexion right  C/o stiffness end range right plantar flexion     Strength/Myotome Testing     Left Ankle/Foot   Normal strength    Right Ankle/Foot   Dorsiflexion: 5  Plantar flexion: 3+  Inversion: 5  Eversion: 5    Ambulation     Ambulation: Level Surfaces   Ambulation without assistive device: independent    Additional Level Surfaces Ambulation Details  Very slow abby improved heel to toe roll off,  Tug                              Precautions: no known med allergies      Manual  3/17            Manual resisted ankle pre right all planes 3 x 10            Girth measurements for edema  Man right le                                                       Exercise Diary  3/17            heelraises 3 positions use ue llbar for support 3 x 10            Single heelraises 2 x 10            baps level 5 standing  Right le cw, cw, df/pf, inv/ever 20 reps             Fitter 3 cords 30 reps             Toe walking  5 ft x 2            Leg press 90 lb 1 set of 10 110 2 sets of 10            Single leg press 70 lb 2 sets of 10             Toe press  110lbs 2 x 10             Single toe press right  30 lb  2 x 10            Cross traininer /treadmill/lifecycle             Squats              heelcord stretch off of step 2 min hold x 1 heelcord stretch with strap  1 min x 1                                                                                                           Modalities  3/17            Ice post exer right ankle  perf

## 2020-03-20 ENCOUNTER — APPOINTMENT (OUTPATIENT)
Dept: PHYSICAL THERAPY | Age: 35
End: 2020-03-20
Payer: COMMERCIAL

## 2020-03-23 ENCOUNTER — APPOINTMENT (OUTPATIENT)
Dept: PHYSICAL THERAPY | Age: 35
End: 2020-03-23
Payer: COMMERCIAL

## 2020-03-25 ENCOUNTER — APPOINTMENT (OUTPATIENT)
Dept: PHYSICAL THERAPY | Age: 35
End: 2020-03-25
Payer: COMMERCIAL

## 2020-03-26 ENCOUNTER — APPOINTMENT (OUTPATIENT)
Dept: PHYSICAL THERAPY | Age: 35
End: 2020-03-26
Payer: COMMERCIAL

## 2020-03-27 ENCOUNTER — APPOINTMENT (OUTPATIENT)
Dept: PHYSICAL THERAPY | Age: 35
End: 2020-03-27
Payer: COMMERCIAL

## 2020-03-31 ENCOUNTER — APPOINTMENT (OUTPATIENT)
Dept: PHYSICAL THERAPY | Age: 35
End: 2020-03-31
Payer: COMMERCIAL

## 2020-04-07 ENCOUNTER — OFFICE VISIT (OUTPATIENT)
Dept: PHYSICAL THERAPY | Age: 35
End: 2020-04-07
Payer: COMMERCIAL

## 2020-04-07 DIAGNOSIS — Z98.890 STATUS POST ACHILLES TENDON REPAIR: Primary | ICD-10-CM

## 2020-04-07 PROCEDURE — 97110 THERAPEUTIC EXERCISES: CPT | Performed by: PHYSICAL THERAPIST

## 2020-04-08 ENCOUNTER — TELEPHONE (OUTPATIENT)
Dept: OBGYN CLINIC | Facility: OTHER | Age: 35
End: 2020-04-08

## 2020-04-08 DIAGNOSIS — Z98.890 OTHER SPECIFIED POSTPROCEDURAL STATES: Primary | ICD-10-CM

## 2020-04-09 ENCOUNTER — APPOINTMENT (OUTPATIENT)
Dept: PHYSICAL THERAPY | Age: 35
End: 2020-04-09
Payer: COMMERCIAL

## 2020-04-14 ENCOUNTER — OFFICE VISIT (OUTPATIENT)
Dept: PHYSICAL THERAPY | Age: 35
End: 2020-04-14
Payer: COMMERCIAL

## 2020-04-14 DIAGNOSIS — Z98.890 STATUS POST ACHILLES TENDON REPAIR: Primary | ICD-10-CM

## 2020-04-14 PROCEDURE — 97110 THERAPEUTIC EXERCISES: CPT

## 2020-04-14 PROCEDURE — 97140 MANUAL THERAPY 1/> REGIONS: CPT

## 2020-04-21 ENCOUNTER — OFFICE VISIT (OUTPATIENT)
Dept: PHYSICAL THERAPY | Age: 35
End: 2020-04-21
Payer: COMMERCIAL

## 2020-04-21 DIAGNOSIS — Z98.890 STATUS POST ACHILLES TENDON REPAIR: Primary | ICD-10-CM

## 2020-04-21 PROCEDURE — 97140 MANUAL THERAPY 1/> REGIONS: CPT | Performed by: SPECIALIST/TECHNOLOGIST

## 2020-04-21 PROCEDURE — 97110 THERAPEUTIC EXERCISES: CPT | Performed by: SPECIALIST/TECHNOLOGIST

## 2020-04-21 PROCEDURE — 97112 NEUROMUSCULAR REEDUCATION: CPT | Performed by: SPECIALIST/TECHNOLOGIST

## 2020-04-28 ENCOUNTER — OFFICE VISIT (OUTPATIENT)
Dept: PHYSICAL THERAPY | Age: 35
End: 2020-04-28
Payer: COMMERCIAL

## 2020-04-28 DIAGNOSIS — Z98.890 STATUS POST ACHILLES TENDON REPAIR: Primary | ICD-10-CM

## 2020-04-28 PROCEDURE — 97140 MANUAL THERAPY 1/> REGIONS: CPT | Performed by: SPECIALIST/TECHNOLOGIST

## 2020-04-28 PROCEDURE — 97110 THERAPEUTIC EXERCISES: CPT | Performed by: SPECIALIST/TECHNOLOGIST

## 2020-04-29 ENCOUNTER — TELEMEDICINE (OUTPATIENT)
Dept: OBGYN CLINIC | Facility: OTHER | Age: 35
End: 2020-04-29
Payer: COMMERCIAL

## 2020-04-29 DIAGNOSIS — S86.001A ACHILLES TENDON INJURY, RIGHT, INITIAL ENCOUNTER: Primary | ICD-10-CM

## 2020-04-29 PROCEDURE — 99442 PR PHYS/QHP TELEPHONE EVALUATION 11-20 MIN: CPT | Performed by: ORTHOPAEDIC SURGERY

## 2020-05-29 ENCOUNTER — TELEMEDICINE (OUTPATIENT)
Dept: FAMILY MEDICINE CLINIC | Facility: CLINIC | Age: 35
End: 2020-05-29
Payer: COMMERCIAL

## 2020-05-29 VITALS — WEIGHT: 235 LBS | BODY MASS INDEX: 34.8 KG/M2 | HEIGHT: 69 IN | TEMPERATURE: 98.9 F

## 2020-05-29 DIAGNOSIS — Z20.828 EXPOSURE TO SARS-ASSOCIATED CORONAVIRUS: Primary | ICD-10-CM

## 2020-05-29 DIAGNOSIS — R05.9 COUGH: ICD-10-CM

## 2020-05-29 DIAGNOSIS — Z20.828 EXPOSURE TO SARS-ASSOCIATED CORONAVIRUS: ICD-10-CM

## 2020-05-29 PROCEDURE — 99214 OFFICE O/P EST MOD 30 MIN: CPT | Performed by: PHYSICIAN ASSISTANT

## 2020-05-29 PROCEDURE — U0003 INFECTIOUS AGENT DETECTION BY NUCLEIC ACID (DNA OR RNA); SEVERE ACUTE RESPIRATORY SYNDROME CORONAVIRUS 2 (SARS-COV-2) (CORONAVIRUS DISEASE [COVID-19]), AMPLIFIED PROBE TECHNIQUE, MAKING USE OF HIGH THROUGHPUT TECHNOLOGIES AS DESCRIBED BY CMS-2020-01-R: HCPCS

## 2020-06-01 ENCOUNTER — TELEPHONE (OUTPATIENT)
Dept: FAMILY MEDICINE CLINIC | Facility: CLINIC | Age: 35
End: 2020-06-01

## 2020-06-01 LAB
INPATIENT: NORMAL
SARS-COV-2 RNA SPEC QL NAA+PROBE: NOT DETECTED

## 2020-06-11 ENCOUNTER — EVALUATION (OUTPATIENT)
Dept: PHYSICAL THERAPY | Age: 35
End: 2020-06-11
Payer: COMMERCIAL

## 2020-06-11 DIAGNOSIS — S86.001A ACHILLES TENDON INJURY, RIGHT, INITIAL ENCOUNTER: Primary | ICD-10-CM

## 2020-06-11 PROCEDURE — 97110 THERAPEUTIC EXERCISES: CPT | Performed by: PHYSICAL THERAPIST

## 2020-06-11 PROCEDURE — 97164 PT RE-EVAL EST PLAN CARE: CPT | Performed by: PHYSICAL THERAPIST

## 2020-06-23 ENCOUNTER — OFFICE VISIT (OUTPATIENT)
Dept: PHYSICAL THERAPY | Age: 35
End: 2020-06-23
Payer: COMMERCIAL

## 2020-06-23 DIAGNOSIS — S86.001A ACHILLES TENDON INJURY, RIGHT, INITIAL ENCOUNTER: Primary | ICD-10-CM

## 2020-06-23 PROCEDURE — 97110 THERAPEUTIC EXERCISES: CPT | Performed by: PHYSICAL THERAPIST

## 2020-06-30 ENCOUNTER — OFFICE VISIT (OUTPATIENT)
Dept: PHYSICAL THERAPY | Age: 35
End: 2020-06-30
Payer: COMMERCIAL

## 2020-06-30 DIAGNOSIS — S86.001A ACHILLES TENDON INJURY, RIGHT, INITIAL ENCOUNTER: Primary | ICD-10-CM

## 2020-06-30 PROCEDURE — 97110 THERAPEUTIC EXERCISES: CPT | Performed by: PHYSICAL THERAPIST

## 2020-07-01 DIAGNOSIS — S86.001A ACHILLES TENDON INJURY, RIGHT, INITIAL ENCOUNTER: Primary | ICD-10-CM

## 2020-07-07 ENCOUNTER — OFFICE VISIT (OUTPATIENT)
Dept: PHYSICAL THERAPY | Age: 35
End: 2020-07-07
Payer: COMMERCIAL

## 2020-07-07 DIAGNOSIS — S86.001A ACHILLES TENDON INJURY, RIGHT, INITIAL ENCOUNTER: Primary | ICD-10-CM

## 2020-07-07 PROCEDURE — 97110 THERAPEUTIC EXERCISES: CPT | Performed by: PHYSICAL THERAPIST

## 2020-07-07 NOTE — PROGRESS NOTES
Daily Note     Today's date: 2020  Patient name: Samantha Davenport  : 1985  MRN: [id number]  Referring provider: Devonte Birch MD  Dx:   Encounter Diagnosis     ICD-10-CM    1  Achilles tendon injury, right, initial encounter S86 001A                   Subjective: Pt reports less pain overall and states she has been able to perform exercises at work      Objective: See treatment diary below      Assessment: Tolerated treatment well  Patient demonstrated fatigue post treatment, would benefit from continued PT and pt continues to tolerated increased repititions of heel raises and demonstrates increased gait speef      Plan: Continue per plan of care  Progress treatment as tolerated         Precautions: R achilles repair       Manuals             Calf STM    TERRELL                                                Neuro Re-Ed                                                                                                        Ther Ex             Hip abd cybex 3x10 3x10 3x10 3x10         Knee ext cybex 3x10 3x10 3x10 3x10         HS curl DL- 30# 2x10, SL 10# 2x10 3x10 3x10 3x10         ambulation 1x500  1x500 np [id number]         Heel raise  3x10 SL 20# leg press SL 4x5 30#         SLS blue foam   3x30s np np         DL stance half roll  3x1 min 3x1 min np         Stance on BOSU   x2 min np         Ther Activity                                       Gait Training                                       Modalities

## 2020-07-13 ENCOUNTER — HOSPITAL ENCOUNTER (OUTPATIENT)
Dept: RADIOLOGY | Facility: HOSPITAL | Age: 35
Discharge: HOME | End: 2020-07-13
Attending: ORTHOPAEDIC SURGERY
Payer: COMMERCIAL

## 2020-07-13 DIAGNOSIS — S86.001A ACHILLES TENDON INJURY, RIGHT, INITIAL ENCOUNTER: ICD-10-CM

## 2020-07-13 PROCEDURE — 73721 MRI JNT OF LWR EXTRE W/O DYE: CPT

## 2020-07-14 ENCOUNTER — OFFICE VISIT (OUTPATIENT)
Dept: PHYSICAL THERAPY | Age: 35
End: 2020-07-14
Payer: COMMERCIAL

## 2020-07-14 DIAGNOSIS — S86.001A ACHILLES TENDON INJURY, RIGHT, INITIAL ENCOUNTER: Primary | ICD-10-CM

## 2020-07-14 PROCEDURE — 97140 MANUAL THERAPY 1/> REGIONS: CPT | Performed by: PHYSICAL THERAPIST

## 2020-07-14 PROCEDURE — 97110 THERAPEUTIC EXERCISES: CPT | Performed by: PHYSICAL THERAPIST

## 2020-07-14 NOTE — PROGRESS NOTES
Daily Note     Today's date: 2020  Patient name: Nallely Gallardo  : 1985  MRN: [id number]  Referring provider: Nelsy Song MD  Dx:   Encounter Diagnosis     ICD-10-CM    1  Achilles tendon injury, right, initial encounter S86 001A                   Subjective: Pt reports some pain in achilles tendon      Objective: See treatment diary below      Assessment: Tolerated treatment well  Patient demonstrated fatigue post treatment, would benefit from continued PT and pt has increased swelling in R ankle/achilles which is likely contributing to pain  Plan: Continue per plan of care  Progress treatment as tolerated         Precautions: R achilles repair       Manuals            Calf STM    TERRELL TERRELL                                               Neuro Re-Ed                                                                                                        Ther Ex             Hip abd cybex 3x10 3x10 3x10 3x10 3x10        Knee ext cybex 3x10 3x10 3x10 3x10 3x10        HS curl DL- 30# 2x10, SL 10# 2x10 3x10 3x10 3x10         ambulation 1x500  1x500 np [id number] np        Heel raise  3x10 SL 20# leg press SL 4x5 30# SL 4x5 30#        SLS blue foam   3x30s np np np        DL stance half roll  3x1 min 3x1 min np np        Stance on BOSU   x2 min np np        Leg press     3x10                                                            Ther Activity                                       Gait Training                                       Modalities

## 2020-07-15 ENCOUNTER — OFFICE VISIT (OUTPATIENT)
Dept: OBGYN CLINIC | Facility: OTHER | Age: 35
End: 2020-07-15
Payer: COMMERCIAL

## 2020-07-15 VITALS — HEIGHT: 69 IN | BODY MASS INDEX: 34.8 KG/M2 | WEIGHT: 235 LBS

## 2020-07-15 DIAGNOSIS — S86.001A ACHILLES TENDON INJURY, RIGHT, INITIAL ENCOUNTER: Primary | ICD-10-CM

## 2020-07-15 PROCEDURE — 1036F TOBACCO NON-USER: CPT | Performed by: ORTHOPAEDIC SURGERY

## 2020-07-15 PROCEDURE — 99213 OFFICE O/P EST LOW 20 MIN: CPT | Performed by: ORTHOPAEDIC SURGERY

## 2020-07-15 PROCEDURE — 3008F BODY MASS INDEX DOCD: CPT | Performed by: ORTHOPAEDIC SURGERY

## 2020-07-15 NOTE — PROGRESS NOTES
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 on he recovery and strengthening  When she does finalize her decision we plan on filing out all the necessary paperwork for her to be off for up to a year if necessary  Return if symptoms worsen or fail to improve  History of Present Illness  Micheal Chavez is a 30 y o  female follow up s/p right achilles tendon repair done on 19  She continues to have weakness and tenderness over the Achilles and plantar fascia    She continues to use the CAM boot while at work due to continued pain with increased activity  She has continued with PT outpatient and tries to do exercises on her own when she has time  She denies any distal numbness and tingling  Review of Systems  Pertinent items are noted in HPI  All other systems were reviewed and are negative  Physical Exam  Ht 5' 9" (1 753 m)   Wt 107 kg (235 lb)   BMI 34 70 kg/m²   Cons: Appears well  No apparent distress  Psych: Alert  Oriented x3  Mood and affect normal   Eyes: PERRLA, EOMI  Resp: Normal effort  No audible wheezing or stridor  CV: Palpable pulse  No discernable arrhythmia  No LE edema  Lymph:  No palpable cervical, axillary, or inguinal lymphadenopathy  Skin: Warm  No palpable masses  No visible lesions  Neuro: Normal muscle tone  Normal and symmetric DTR's  Right Foot & Ankle Exam  Alignment:  Normal ankle alignment  Inspection:  No edema  No erythema  No ecchymosis  Incision healed  Palpation:  mild to moderate tenderness at the achilles throughout the distal 1/3 of the tendon and mild tenderness over the plantar fascia     ROM:  Ankle DF 15  Ankle PF 60  Both comparable to the Left ankle  Strength:  Anterior tibialis 5/5  Gastroc/Soleus 4+/5  She does have weakness and cannot do a toe raise on the R side due to weakness  Stability:  No objective ankle instablity  (-) Anterior  neutral and PF  (-) Talar Tilt  Tests:  No pertinent positive or negative tests  Neurovascular:  Sensation intact in DP/SP/Crews/Sa/T nerve distributions  2+ DP & PT pulses  Gait:  Antalgic  Studies Reviewed  I have personally reviewed pertinent films in PACS  MRI of right ankle - from 7/13/20 show post surgical changes from achillies repair but no retear  Also chonic tearing of the anterior talofibular ligament    Procedures  No procedures today  Medical, Surgical, Family, and Social History  The patient's medical history, family history, and social history, were reviewed and updated as appropriate  Past Medical History:   Diagnosis Date    Chronic sinus infection     Depression     Post-op pain        Past Surgical History:   Procedure Laterality Date    ESOPHAGOGASTRODUODENOSCOPY N/A 8/15/2018    Procedure: ESOPHAGOGASTRODUODENOSCOPY (EGD); Surgeon: Rodrigue Brennan MD;  Location: [facility];   Service: Gastroenterology    HAND SURGERY Right     KNEE ARTHROSCOPY Left     lateral meniscus tear, ACL tear    KNEE SURGERY Left     TN REPAIR ACHILLES TENDON,PRIMARY Right 9/30/2019    Procedure: REPAIR TENDON ACHILLES;  Surgeon: Josh Nugent MD;  Location: [facility];  Service: Orthopedics    UPPER GASTROINTESTINAL ENDOSCOPY         Family History   Problem Relation Age of Onset    Depression Mother     Hypertension Mother     Diabetes Father     Hypertension Father     Cancer Father     Heart failure Father     Leukemia Father     No Known Problems Sister     No Known Problems Brother     Diabetes Paternal Grandmother     Hypertension Paternal Grandmother     Alcohol abuse Maternal Uncle     Depression Maternal Uncle     Mental illness Maternal Uncle     Alcohol abuse Cousin     Depression Cousin     Mental illness Cousin        Social History     Occupational History    Not on file   Tobacco Use    Smoking status: Never Smoker    Smokeless tobacco: Never Used   Substance and Sexual Activity    Alcohol use: Yes     Comment: social    Drug use: No    Sexual activity: Not on file       No Known Allergies      Current Outpatient Medications:     fexofenadine (ALLEGRA) 180 MG tablet, Take 1 tablet (180 mg total) by mouth daily, Disp: , Rfl: 0    ibuprofen (MOTRIN) 800 mg tablet, Take by mouth every 6 (six) hours as needed for mild pain, Disp: , Rfl:     mometasone (NASONEX) 50 mcg/act nasal spray, 2 sprays into each nostril daily, Disp: 17 g, Rfl: 5    aspirin (ECOTRIN) 325 mg EC tablet, Take 1 tablet (325 mg total) by mouth 2 (two) times a day for 14 days, Disp: 28 tablet, Rfl: 0    levonorgestrel (MIRENA) 20 MCG/24HR IUD, 1 Intra Uterine Device by Intrauterine route once for 1 dose (Patient taking differently: 1 each by Intrauterine route once ), Disp: 1 each, Rfl: 0    meloxicam (MOBIC) 15 mg tablet, Take 1 tablet (15 mg total) by mouth daily, Disp: 30 tablet, Rfl: 0      Ady Fabian PA-C    Scribe Attestation    I,:    am acting as a scribe while in the presence of the attending physician :        I,:    personally performed the services described in this documentation    as scribed in my presence :

## 2020-07-20 ENCOUNTER — OFFICE VISIT (OUTPATIENT)
Dept: PHYSICAL THERAPY | Age: 35
End: 2020-07-20
Payer: COMMERCIAL

## 2020-07-20 DIAGNOSIS — S86.001A ACHILLES TENDON INJURY, RIGHT, INITIAL ENCOUNTER: Primary | ICD-10-CM

## 2020-07-20 PROCEDURE — 97110 THERAPEUTIC EXERCISES: CPT | Performed by: PHYSICAL THERAPIST

## 2020-07-20 NOTE — PROGRESS NOTES
Daily Note     Today's date: 2020  Patient name: Irina Ness  : 1985  MRN: [id number]  Referring provider: Christopher Lovett MD  Dx:   Encounter Diagnosis     ICD-10-CM    1  Achilles tendon injury, right, initial encounter S86 001A                   Subjective: pt reports minimal pain      Objective: See treatment diary below      Assessment: Tolerated treatment well  Patient demonstrated fatigue post treatment, would benefit from continued PT and pt tolerated exercises well and is progressing with R plantarflexor muscular endurance  Plan: Continue per plan of care  Progress treatment as tolerated         Precautions: R achilles repair       Manuals           Calf STM    TERRELL TERRELL np                                              Neuro Re-Ed                                                                                                        Ther Ex             Hip abd cybex 3x10 3x10 3x10 3x10 3x10 3x10       Knee ext cybex 3x10 3x10 3x10 3x10 3x10 3x10       HS curl DL- 30# 2x10, SL 10# 2x10 3x10 3x10 3x10         ambulation 1x500  1x500 np [id number] np [id number]       Heel raise  3x10 SL 20# leg press SL 4x5 30# SL 4x5 30# SL 4x5 30#       SLS blue foam   3x30s np np np np       DL stance half roll  3x1 min 3x1 min np np np       Stance on BOSU   x2 min np np np       Leg press     3x10 np                                                           Ther Activity                                       Gait Training                                       Modalities

## 2020-07-28 ENCOUNTER — OFFICE VISIT (OUTPATIENT)
Dept: PHYSICAL THERAPY | Age: 35
End: 2020-07-28
Payer: COMMERCIAL

## 2020-07-28 DIAGNOSIS — S86.001A ACHILLES TENDON INJURY, RIGHT, INITIAL ENCOUNTER: Primary | ICD-10-CM

## 2020-07-28 PROCEDURE — 97110 THERAPEUTIC EXERCISES: CPT | Performed by: PHYSICAL THERAPIST

## 2020-07-28 NOTE — PROGRESS NOTES
Daily Note     Today's date: 2020  Patient name: Tierra Toure  : 1985  MRN: [id number]  Referring provider: Taylor Jang MD  Dx:   Encounter Diagnosis     ICD-10-CM    1  Achilles tendon injury, right, initial encounter S86 001A                   Subjective: Pt reports minimal changes      Objective: See treatment diary below      Assessment: Tolerated treatment well  Patient demonstrated fatigue post treatment, would benefit from continued PT and pt continues to be challenged with PREs  Plan: Continue per plan of care  Progress treatment as tolerated         Precautions: R achilles repair       Manuals          Calf STM    TERRELL TERRELL np np                                             Neuro Re-Ed                                                                                                        Ther Ex             Hip abd cybex 3x10 3x10 3x10 3x10 3x10 3x10 3x10      Knee ext cybex 3x10 3x10 3x10 3x10 3x10 3x10 3x10      HS curl DL- 30# 2x10, SL 10# 2x10 3x10 3x10 3x10         ambulation 1x500  1x500 np [id number] np [id number] np      Heel raise  3x10 SL 20# leg press SL 4x5 30# SL 4x5 30# SL 4x5 30# SL 4x5 30#      SLS blue foam   3x30s np np np np np      DL stance half roll  3x1 min 3x1 min np np np np      Stance on BOSU   x2 min np np np np      Leg press     3x10 np 3x10                                                          Ther Activity                                       Gait Training                                       Modalities

## 2020-07-29 DIAGNOSIS — S86.001A ACHILLES TENDON INJURY, RIGHT, INITIAL ENCOUNTER: Primary | ICD-10-CM

## 2020-08-04 ENCOUNTER — OFFICE VISIT (OUTPATIENT)
Dept: PHYSICAL THERAPY | Age: 35
End: 2020-08-04
Payer: COMMERCIAL

## 2020-08-04 DIAGNOSIS — S86.001A ACHILLES TENDON INJURY, RIGHT, INITIAL ENCOUNTER: Primary | ICD-10-CM

## 2020-08-04 PROCEDURE — 97110 THERAPEUTIC EXERCISES: CPT | Performed by: PHYSICAL THERAPIST

## 2020-08-04 NOTE — PROGRESS NOTES
Daily Note     Today's date: 2020  Patient name: Garrett Aquino  : 1985  MRN: [id number]  Referring provider: Roxanna Mckay MD  Dx:   Encounter Diagnosis     ICD-10-CM    1  Achilles tendon injury, right, initial encounter  S86 001A                   Subjective: Pt reports minimal changes       Objective: See treatment diary below      Assessment: Tolerated treatment well  Patient demonstrated fatigue post treatment, would benefit from continued PT and pt continues to be challenged with PREs  Plan: Continue per plan of care  Progress treatment as tolerated         Precautions: R achilles repair       Manuals         Calf STM    TERRELL TERRELL np np                                             Neuro Re-Ed                                                                                                        Ther Ex             Hip abd cybex 3x10 3x10 3x10 3x10 3x10 3x10 3x10 3x10 4 5 plates     Knee ext cybex 3x10 3x10 3x10 3x10 3x10 3x10 3x10 3x10 4 plates     HS curl DL- 30# 2x10, SL 10# 2x10 3x10 3x10 3x10    3x10 50#     ambulation 1x500  1x500 np [id number] np [id number] np np     Heel raise  3x10 SL 20# leg press SL 4x5 30# SL 4x5 30# SL 4x5 30# SL 4x5 30# SL 4x5 30#     SLS blue foam   3x30s np np np np np np     DL stance half roll  3x1 min 3x1 min np np np np np     Stance on BOSU   x2 min np np np np np     Leg press     3x10 np 3x10 3x10     Tandem walking blue foam        5x10'                                            Ther Activity                                       Gait Training                                       Modalities

## 2020-08-06 ENCOUNTER — OFFICE VISIT (OUTPATIENT)
Dept: PHYSICAL THERAPY | Age: 35
End: 2020-08-06
Payer: COMMERCIAL

## 2020-08-06 DIAGNOSIS — S86.001A ACHILLES TENDON INJURY, RIGHT, INITIAL ENCOUNTER: Primary | ICD-10-CM

## 2020-08-06 PROCEDURE — 97110 THERAPEUTIC EXERCISES: CPT | Performed by: SPECIALIST/TECHNOLOGIST

## 2020-08-06 NOTE — PROGRESS NOTES
Daily Note     Today's date: 2020  Patient name: Janice William  : 1985  MRN: [id number]  Referring provider: Nette Page MD  Dx:   Encounter Diagnosis     ICD-10-CM    1  Achilles tendon injury, right, initial encounter  S86 001A                   Subjective: Pt reports L calf soreness following previous treatment session  Objective: See treatment diary below    Assessment: Pt's PF strength improving slowly but consistently, pt able to perform isometric PF this visit in addition to sled pushes with concentration of toe toe off  Tolerated treatment well  Patient demonstrated fatigue post treatment, exhibited good technique with therapeutic exercises and would benefit from continued PT    Plan: Continue per plan of care  Progress treatment as tolerated         Precautions: R achilles repair       Manuals     Calf STM TERRELL TERRELL np np                                    Neuro Re-Ed                                                                                Ther Ex          Hip abd cybex 3x10 3x10 3x10 3x10 3x10 4 5 plates 73# 6R71    Knee ext cybex 3x10 3x10 3x10 3x10 3x10 4 plates 04# 7G91    HS curl 3x10    3x10 50# 3x10 50#     ambulation [id number] np [id number] np np     Heel raise SL 4x5 30# SL 4x5 30# SL 4x5 30# SL 4x5 30# SL 4x5 30# SL 4x5 30#, 3x10 foam     SLS blue foam  np np np np np -    DL stance half roll np np np np np -    Stance on BOSU np np np np np -    Leg press  3x10 np 3x10 3x10 3x10    Tandem walking blue foam     5x10' 5x10'    ISO PF @ Wall       30x    Sled Push Pull       3x 50ft  25#               Ther Activity                              Gait Training                              Modalities

## 2020-08-07 ENCOUNTER — TELEPHONE (OUTPATIENT)
Dept: OBGYN CLINIC | Facility: HOSPITAL | Age: 35
End: 2020-08-07

## 2020-08-07 NOTE — TELEPHONE ENCOUNTER
Radha has faxed me disability paperwork requesting patients targeted return to work date       Please advise    Thank you

## 2020-08-10 ENCOUNTER — OFFICE VISIT (OUTPATIENT)
Dept: PHYSICAL THERAPY | Age: 35
End: 2020-08-10
Payer: COMMERCIAL

## 2020-08-10 DIAGNOSIS — S86.001A ACHILLES TENDON INJURY, RIGHT, INITIAL ENCOUNTER: Primary | ICD-10-CM

## 2020-08-10 PROCEDURE — 97110 THERAPEUTIC EXERCISES: CPT | Performed by: PHYSICAL THERAPIST

## 2020-08-10 NOTE — PROGRESS NOTES
Daily Note     Today's date: 8/10/2020  Patient name: Janice William  : 1985  MRN: [id number]  Referring provider: Nette Page MD  Dx:   Encounter Diagnosis     ICD-10-CM    1  Achilles tendon injury, right, initial encounter  S86 001A                   Subjective: Pt reports minimal changes      Objective: See treatment diary below      Assessment: Tolerated treatment well  Patient demonstrated fatigue post treatment, would benefit from continued PT and pt has pain in midfoot with some SLS, sled pushing activity  Plan: Continue per plan of care  Progress treatment as tolerated         Precautions: R achilles repair       Manuals 7/7 7/14 7/20 7/28 8/4 8/6 8/10   Calf STM TERRELL TERRELL np np                                    Neuro Re-Ed                                                                                Ther Ex          Hip abd cybex 3x10 3x10 3x10 3x10 3x10 4 5 plates 70# 2P58 45# 5Z60   Knee ext cybex 3x10 3x10 3x10 3x10 3x10 4 plates 74# 9X44 11# 0U20   HS curl 3x10    3x10 50# 3x10 50#  3x10 50#    ambulation 1x090ct np [id number] np np     Heel raise SL 4x5 30# SL 4x5 30# SL 4x5 30# SL 4x5 30# SL 4x5 30# SL 4x5 30#, 3x10 foam  SL 4x5 30#, 3x10 foam   SLS blue foam  np np np np np - np   DL stance half roll np np np np np - 3x30s   Stance on BOSU np np np np np - dynadisk 3x30s   Leg press  3x10 np 3x10 3x10 3x10 3x10   Tandem walking blue foam     5x10' 5x10' np   ISO PF @ Wall       30x x20   Sled Push Pull       3x 50ft  25#  pain   PF isometrics       5x5s   Ther Activity                              Gait Training                              Modalities

## 2020-08-12 ENCOUNTER — OFFICE VISIT (OUTPATIENT)
Dept: PHYSICAL THERAPY | Age: 35
End: 2020-08-12
Payer: COMMERCIAL

## 2020-08-12 DIAGNOSIS — S86.001A ACHILLES TENDON INJURY, RIGHT, INITIAL ENCOUNTER: Primary | ICD-10-CM

## 2020-08-12 PROCEDURE — 97110 THERAPEUTIC EXERCISES: CPT | Performed by: PHYSICAL THERAPIST

## 2020-08-12 NOTE — PROGRESS NOTES
Daily Note     Today's date: 2020  Patient name: Juan Byrd  : 1985  MRN: [id number]  Referring provider: Rosanne Nash MD  Dx:   Encounter Diagnosis     ICD-10-CM    1  Achilles tendon injury, right, initial encounter  S86 001A                   Subjective: Pt reports some AM stiffness    Objective: See treatment diary below      Assessment: Tolerated treatment well  Patient demonstrated fatigue post treatment, would benefit from continued PT and pt continues to have midfoot pain with pushing sled, but pain did not increase with activity      Plan: Continue per plan of care  Progress treatment as tolerated         Precautions: R achilles repair       Manuals       8/10   Calf STM                                        Neuro Re-Ed                                                                                Ther Ex 55# 3x10         Hip abd cybex       55# 3x10   Knee ext cybex 40# 3x10      40# 3x10   HS curl       3x10 50#    ambulation          Heel raise SL 4x5 30#      SL 4x5 30#, 3x10 foam   SLS blue foam  np      np   DL stance half roll 3x30s      3x30s   Stance on BOSU dynadisk 3x30s      dynadisk 3x30s   Leg press 3x10      3x10   Tandem walking blue foam 5x10' front/back       np   ISO PF @ Wall np      x20   Sled Push Pull 50# 5x ea      pain   PF isometrics np      5x5s   Ther Activity                              Gait Training                              Modalities

## 2020-08-13 ENCOUNTER — OFFICE VISIT (OUTPATIENT)
Dept: PHYSICAL THERAPY | Age: 35
End: 2020-08-13
Payer: COMMERCIAL

## 2020-08-13 DIAGNOSIS — S86.001A ACHILLES TENDON INJURY, RIGHT, INITIAL ENCOUNTER: Primary | ICD-10-CM

## 2020-08-13 PROCEDURE — 97110 THERAPEUTIC EXERCISES: CPT | Performed by: PHYSICAL THERAPIST

## 2020-08-13 NOTE — PROGRESS NOTES
Daily Note     Today's date: 2020  Patient name: Yola Liang  : 1985  MRN: [id number]  Referring provider: Sarah Shanks MD  Dx:   Encounter Diagnosis     ICD-10-CM    1  Achilles tendon injury, right, initial encounter  S86 001A                   Subjective: Pt reports minimal changes      Objective: See treatment diary below      Assessment: Tolerated treatment well  Patient demonstrated fatigue post treatment, would benefit from continued PT and pt struggled with SLS and step ups onto BOSU but tolerated exercises well  Plan: Continue per plan of care  Progress treatment as tolerated         Precautions: R achilles repair       Manuals       8/10   Calf STM                                        Neuro Re-Ed                                                                                Ther Ex          Hip abd cybex 55# 3x10 np     55# 3x10   Knee ext cybex 40# 3x10 np     40# 3x10   HS curl       3x10 50#    ambulation          Heel raise SL 4x5 30# np     SL 4x5 30#, 3x10 foam   SLS blue foam  np np     np   DL stance half roll 3x30s 3x30s     3x30s   Stance on BOSU dynadisk 3x30s dynadisk 3x30s     dynadisk 3x30s   Leg press 3x10 np     3x10   Tandem walking blue foam 5x10' front/back  5x10' front/back     np   ISO PF @ Wall np Parallel bars 10x5s     x20   Sled Push Pull 50# 5x ea np     pain   PF isometrics np np     5x5s   Step up BOSU  x30 ea        SLS BOSU  50# 3x15        deadlift                              Ther Activity                              Gait Training                              Modalities

## 2020-08-14 ENCOUNTER — APPOINTMENT (OUTPATIENT)
Dept: PHYSICAL THERAPY | Age: 35
End: 2020-08-14
Payer: COMMERCIAL

## 2020-08-17 ENCOUNTER — OFFICE VISIT (OUTPATIENT)
Dept: PHYSICAL THERAPY | Age: 35
End: 2020-08-17
Payer: COMMERCIAL

## 2020-08-17 DIAGNOSIS — S86.001A ACHILLES TENDON INJURY, RIGHT, INITIAL ENCOUNTER: Primary | ICD-10-CM

## 2020-08-17 PROCEDURE — 97110 THERAPEUTIC EXERCISES: CPT | Performed by: PHYSICAL THERAPIST

## 2020-08-17 NOTE — PROGRESS NOTES
Daily Note     Today's date: 2020  Patient name: Savage Rodriguez  : 1985  MRN: [id number]  Referring provider: Belinda Antunez MD  Dx:   Encounter Diagnosis     ICD-10-CM    1  Achilles tendon injury, right, initial encounter  S86 001A                   Subjective: Pt reports R medial midfoot pain       Objective: See treatment diary below      Assessment: Tolerated treatment well  Patient demonstrated fatigue post treatment, would benefit from continued PT and pt continues to struggle with dynamic balance, with midfoot pain likely due to lack of dynamic exercise since injury      Plan: Continue per plan of care  Progress treatment as tolerated         Precautions: R achilles repair       Manuals       8/10   Calf STM                                        Neuro Re-Ed                                                                                Ther Ex          Hip abd cybex 55# 3x10 np 55# 3x10    55# 3x10   Knee ext cybex 40# 3x10 np 40# 3x10    40# 3x10   HS curl       3x10 50#    ambulation          Heel raise SL 4x5 30# np SL 4x5 30#    SL 4x5 30#, 3x10 foam   SLS blue foam  np np     np   DL stance half roll 3x30s 3x30s 3x30s    3x30s   Stance on BOSU dynadisk 3x30s dynadisk 3x30s dynadisk 3x30s    dynadisk 3x30s   Leg press 3x10 np 3x10    3x10   Tandem walking blue foam 5x10' front/back  5x10' front/back np    np   ISO PF @ Wall np Parallel bars 10x5s np    x20   Sled Push Pull 50# 5x ea np 50# 5x ea    pain   PF isometrics np np     5x5s   Step up BOSU  x30 ea Blue foam x30       SLS BOSU  np np       deadlift   np                           Ther Activity                              Gait Training                              Modalities

## 2020-08-19 ENCOUNTER — OFFICE VISIT (OUTPATIENT)
Dept: PHYSICAL THERAPY | Age: 35
End: 2020-08-19
Payer: COMMERCIAL

## 2020-08-19 DIAGNOSIS — S86.001A ACHILLES TENDON INJURY, RIGHT, INITIAL ENCOUNTER: Primary | ICD-10-CM

## 2020-08-19 PROCEDURE — 97110 THERAPEUTIC EXERCISES: CPT | Performed by: PHYSICAL THERAPIST

## 2020-08-19 NOTE — PROGRESS NOTES
Daily Note     Today's date: 2020  Patient name: Daniel Juan  : 1985  MRN: [id number]  Referring provider: Monika Richardson MD  Dx:   Encounter Diagnosis     ICD-10-CM    1  Achilles tendon injury, right, initial encounter  S86 001A                   Subjective: Pt reports minimal changes    Objective: See treatment diary below      Assessment: Tolerated treatment well  Patient demonstrated fatigue post treatment, would benefit from continued PT and pt continues to struggle with SLS  Plan: Continue per plan of care  Progress treatment as tolerated         Precautions: R achilles repair       Manuals      8/10   Calf STM                                        Neuro Re-Ed                                                                                Ther Ex          Hip abd cybex 55# 3x10 np 55# 3x10 55# 3x10   55# 3x10   Knee ext cybex 40# 3x10 np 40# 3x10 40# 3x10   40# 3x10   HS curl       3x10 50#    ambulation          Heel raise SL 4x5 30# np SL 4x5 30# SL 4x5 30#   SL 4x5 30#, 3x10 foam   SLS blue foam  np np     np   DL stance half roll 3x30s 3x30s 3x30s 3x30s   3x30s   Stance on BOSU dynadisk 3x30s dynadisk 3x30s dynadisk 3x30s Blue foam SLS 3x30s   dynadisk 3x30s   Leg press 3x10 np 3x10 3x10   3x10   Tandem walking blue foam 5x10' front/back  5x10' front/back np np   np   ISO PF @ Wall np Parallel bars 10x5s np Parallel bars 10x5s   x20   Sled Push Pull 50# 5x ea np 50# 5x ea np   pain   PF isometrics np np     5x5s   Step up BOSU  x30 ea Blue foam x30 np      SLS BOSU  np np np      deadlift   np np                          Ther Activity                              Gait Training                              Modalities

## 2020-08-20 ENCOUNTER — OFFICE VISIT (OUTPATIENT)
Dept: PHYSICAL THERAPY | Age: 35
End: 2020-08-20
Payer: COMMERCIAL

## 2020-08-20 DIAGNOSIS — S86.001A ACHILLES TENDON INJURY, RIGHT, INITIAL ENCOUNTER: Primary | ICD-10-CM

## 2020-08-20 PROCEDURE — 97110 THERAPEUTIC EXERCISES: CPT | Performed by: SPECIALIST/TECHNOLOGIST

## 2020-08-20 NOTE — PROGRESS NOTES
Daily Note     Today's date: 2020  Patient name: Nilesh Espinal  : 1985  MRN: [id number]  Referring provider: Earl Garza MD  Dx:   Encounter Diagnosis     ICD-10-CM    1  Achilles tendon injury, right, initial encounter  S86 001A                   Subjective: Gradual improvement in ambulation and balance per pt report  Objective: See treatment diary below  1:1 PT Vania Bachelor [phone]    Assessment: Gradual improvement in toe off during gait cycle and SLS balance attributed to improved strength and proprioception  Tolerated treatment well  Patient demonstrated fatigue post treatment and exhibited good technique with therapeutic exercises  And would benefit from continued PT  Plan: Continue per plan of care  Progress treatment as tolerated         Precautions: R achilles repair       Manuals        Calf STM                                        Neuro Re-Ed                    Ther Ex          Hip abd cybex 55# 3x10 np 55# 3x10 55# 3x10 55# 3x10     Knee ext cybex 40# 3x10 np 40# 3x10 40# 3x10 40# 3x10     HS curl     40# 3x10     ambulation          Heel raise SL 4x5 30# np SL 4x5 30# SL 4x5 30# SL 4x5 30#     SLS blue foam  np np        DL stance half roll 3x30s 3x30s 3x30s 3x30s 3x30s     Stance on BOSU dynadisk 3x30s dynadisk 3x30s dynadisk 3x30s Blue foam SLS 3x30s Blue foam SLS 3x30s     Leg press 3x10 np 3x10 3x10 3x10 45#     Tandem walking blue foam 5x10' front/back  5x10' front/back np np 5x10' FW/BW/SS     ISO PF @ Wall np Parallel bars 10x5s np Parallel bars 10x5s 20x // bars     Sled Push Pull 50# 5x ea np 50# 5x ea np 50# 5x     PF isometrics np np        Step up BOSU  x30 ea Blue foam x30 np x30     SLS BOSU  np np np np     deadlift   np np np     HR // Bars Slant     20x               Ther Activity                              Gait Training                              Modalities

## 2020-08-21 ENCOUNTER — APPOINTMENT (OUTPATIENT)
Dept: PHYSICAL THERAPY | Age: 35
End: 2020-08-21
Payer: COMMERCIAL

## 2020-08-24 ENCOUNTER — OFFICE VISIT (OUTPATIENT)
Dept: PHYSICAL THERAPY | Age: 35
End: 2020-08-24
Payer: COMMERCIAL

## 2020-08-24 DIAGNOSIS — S86.001A ACHILLES TENDON INJURY, RIGHT, INITIAL ENCOUNTER: Primary | ICD-10-CM

## 2020-08-24 PROCEDURE — 97110 THERAPEUTIC EXERCISES: CPT | Performed by: PHYSICAL THERAPIST

## 2020-08-24 NOTE — PROGRESS NOTES
Daily Note     Today's date: 2020  Patient name: Teofilo Bhakta  : 1985  MRN: [id number]  Referring provider: Consuelo Durbin MD  Dx:   Encounter Diagnosis     ICD-10-CM    1  Achilles tendon injury, right, initial encounter  S86 001A                   Subjective: Pt reports pain in lateral calcaneus that began yesterday and is worse with activity       Objective: See treatment diary below      Assessment: Tolerated treatment well  Patient demonstrated fatigue post treatment, would benefit from continued PT and pt continued to have pain throughout treatment  Pt will be re-evaluated next visit  Plan: Continue per plan of care  Progress treatment as tolerated         Precautions: R achilles repair       Manuals       Calf STM                                        Neuro Re-Ed                    Ther Ex          Bike      15'    Hip abd cybex 55# 3x10 np 55# 3x10 55# 3x10 55# 3x10 55# 3x10    Knee ext cybex 40# 3x10 np 40# 3x10 40# 3x10 40# 3x10 40# 3x10    HS curl     40# 3x10 40# 3x10    ambulation          Heel raise SL 4x5 30# np SL 4x5 30# SL 4x5 30# SL 4x5 30# np    SLS blue foam  np np    np    DL stance half roll 3x30s 3x30s 3x30s 3x30s 3x30s np    Stance on BOSU dynadisk 3x30s dynadisk 3x30s dynadisk 3x30s Blue foam SLS 3x30s Blue foam SLS 3x30s np    Leg press 3x10 np 3x10 3x10 3x10 45# np    Tandem walking blue foam 5x10' front/back  5x10' front/back np np 5x10' FW/BW/SS np    ISO PF @ Wall np Parallel bars 10x5s np Parallel bars 10x5s 20x // bars np    Sled Push Pull 50# 5x ea np 50# 5x ea np 50# 5x np    PF isometrics np np    np    Step up BOSU  x30 ea Blue foam x30 np x30 np    SLS BOSU  np np np np np    deadlift   np np np nnp    HR // Bars Slant     20x np              Ther Activity                              Gait Training                              Modalities

## 2020-08-26 ENCOUNTER — OFFICE VISIT (OUTPATIENT)
Dept: PHYSICAL THERAPY | Age: 35
End: 2020-08-26
Payer: COMMERCIAL

## 2020-08-26 DIAGNOSIS — S86.001A ACHILLES TENDON INJURY, RIGHT, INITIAL ENCOUNTER: Primary | ICD-10-CM

## 2020-08-26 PROCEDURE — 97110 THERAPEUTIC EXERCISES: CPT | Performed by: SPECIALIST/TECHNOLOGIST

## 2020-08-26 NOTE — PROGRESS NOTES
Daily Note     Today's date: 2020  Patient name: Edson Melissa  : 1985  MRN: [id number]  Referring provider: Ector Nixon MD  Dx:   Encounter Diagnosis     ICD-10-CM    1  Achilles tendon injury, right, initial encounter  S86 001A                   Subjective: Pt reports discomfort persists at achilles insertion however is has improved since  PT visit  Objective: See treatment diary below    Assessment: Omitted several PF exercises this visit to allow achilles discomfort to resolve  Improved toe off initiation during gait cycle and SLS balance observed  Tolerated treatment well  Patient demonstrated fatigue post treatment, exhibited good technique with therapeutic exercises and would benefit from continued PT    Plan: Continue per plan of care  Progress treatment as tolerated         Precautions: R achilles repair       Manuals      Calf STM                                        Neuro Re-Ed                    Ther Ex          Bike      15' 10'   Hip abd cybex 55# 3x10 np 55# 3x10 55# 3x10 55# 3x10 55# 3x10 55# 30x   Knee ext cybex 40# 3x10 np 40# 3x10 40# 3x10 40# 3x10 40# 3x10 40# 30x   HS curl     40# 3x10 40# 3x10 50# 30x   ambulation          Heel raise SL 4x5 30# np SL 4x5 30# SL 4x5 30# SL 4x5 30# np np   DL stance half roll 3x30s 3x30s 3x30s 3x30s 3x30s np 3x30s   Stance on BOSU dynadisk 3x30s dynadisk 3x30s dynadisk 3x30s Blue foam SLS 3x30s Blue foam SLS 3x30s np Blue foam SLS 3x30s   Leg press 3x10 np 3x10 3x10 3x10 45# np 75# 30x DL   Tandem walking blue foam 5x10' front/back  5x10' front/back np np 5x10' FW/BW/SS np 5x10' FW/BW/SS   ISO PF @ Wall np Parallel bars 10x5s np Parallel bars 10x5s 20x // bars np np   Sled Push Pull 50# 5x ea np 50# 5x ea np 50# 5x np np   PF isometrics np np    np    Step up BOSU  x30 ea Blue foam x30 np x30 np x30   SLS BOSU  np np np np np 30x5"   deadlift   np np np nnp 30x 25#   HR // Bars Slant     20x np np   Star [employer]ion       5x   Ther Activity                              Gait Training                              Modalities

## 2020-08-28 ENCOUNTER — OFFICE VISIT (OUTPATIENT)
Dept: PHYSICAL THERAPY | Age: 35
End: 2020-08-28
Payer: COMMERCIAL

## 2020-08-28 DIAGNOSIS — S86.001A ACHILLES TENDON INJURY, RIGHT, INITIAL ENCOUNTER: Primary | ICD-10-CM

## 2020-08-28 PROCEDURE — 97110 THERAPEUTIC EXERCISES: CPT | Performed by: SPECIALIST/TECHNOLOGIST

## 2020-08-28 NOTE — PROGRESS NOTES
Daily Note     Today's date: 2020  Patient name: Jaron Jain  : 1985  MRN: [id number]  Referring provider: Zita Barnes MD  Dx:   Encounter Diagnosis     ICD-10-CM    1  Achilles tendon injury, right, initial encounter  S86 001A                   Subjective: Pt reports lateral ankle soreness persists however PT treatment session on  did not make it any worse  Objective: See treatment diary below    Assessment: SLS stability continues to improve thus improving postural sway during gait cycle  Tolerated treatment well  Patient demonstrated fatigue post treatment, exhibited good technique with therapeutic exercises and would benefit from continued PT    Plan: Continue per plan of care  Progress treatment as tolerated         Precautions: R achilles repair     Manuals       Calf STM                                            Neuro Re-Ed                      Ther Ex           Bike   15' 10' 10'      Hip abd cybex 55# 3x10 55# 3x10 55# 3x10 55# 30x 55# 30x      Knee ext cybex 40# 3x10 40# 3x10 40# 3x10 40# 30x 45# 30x      HS curl  40# 3x10 40# 3x10 50# 30x 55# 30x      ambulation           Heel raise SL 4x5 30# SL 4x5 30# np np       DL stance half roll 3x30s 3x30s np 3x30s -      Stance on BOSU Blue foam SLS 3x30s Blue foam SLS 3x30s np Blue foam SLS 3x30s Blue foam SLS 3x30s      Leg press 3x10 3x10 45# np 75# 30x DL 75# 30x DL      Tandem walking blue foam np 5x10' FW/BW/SS np 5x10' FW/BW/SS 5x10' FW/BW      ISO PF @ Wall Parallel bars 10x5s 20x // bars np np np      Sled Push Pull np 50# 5x np np np      PF isometrics   np        Step up BOSU np x30 np x30 x30      SLS BOSU np np np 30x5" 30x5"      deadlift np np nnp 30x 25# 30x 25#      HR // Bars Slant  20x np np       [employer]    5x 5x      RDL Cone Touch SLS     [id number] targets      Ther Activity                                 Gait Training                                 Modalities *1:1 [facility] [phone]*

## 2020-08-31 ENCOUNTER — APPOINTMENT (OUTPATIENT)
Dept: PHYSICAL THERAPY | Age: 35
End: 2020-08-31
Payer: COMMERCIAL

## 2020-09-02 ENCOUNTER — APPOINTMENT (OUTPATIENT)
Dept: PHYSICAL THERAPY | Age: 35
End: 2020-09-02
Payer: COMMERCIAL

## 2020-09-04 ENCOUNTER — APPOINTMENT (OUTPATIENT)
Dept: PHYSICAL THERAPY | Age: 35
End: 2020-09-04
Payer: COMMERCIAL

## 2020-09-09 ENCOUNTER — APPOINTMENT (OUTPATIENT)
Dept: PHYSICAL THERAPY | Age: 35
End: 2020-09-09
Payer: COMMERCIAL

## 2020-09-11 ENCOUNTER — OFFICE VISIT (OUTPATIENT)
Dept: PHYSICAL THERAPY | Age: 35
End: 2020-09-11
Payer: COMMERCIAL

## 2020-09-11 DIAGNOSIS — S86.001A ACHILLES TENDON INJURY, RIGHT, INITIAL ENCOUNTER: Primary | ICD-10-CM

## 2020-09-11 PROCEDURE — 97110 THERAPEUTIC EXERCISES: CPT | Performed by: SPECIALIST/TECHNOLOGIST

## 2020-09-11 PROCEDURE — 97140 MANUAL THERAPY 1/> REGIONS: CPT | Performed by: SPECIALIST/TECHNOLOGIST

## 2020-09-11 NOTE — PROGRESS NOTES
Daily Note     Today's date: 2020  Patient name: Avis Thomas  : 1985  MRN: [id number]  Referring provider: Tatiana Smith MD  Dx:   Encounter Diagnosis     ICD-10-CM    1  Achilles tendon injury, right, initial encounter  S86 001A                   Subjective: Pt reports she recently got a 2nd opinion by ortho and she has achilles shortening and is to continue with stretches to improve this  Objective: See treatment diary below  Assessment: PT Will Updegrove discussed recent 2nd opinion with PT and how it effects POC moving forward  Mobilizations added to POC this visit to improve joint ROM in addition to extended hold times for gastroc-soleus to improve soft tissue extensibility  Pt encouraged to continue to perform these stretches as part of HEP with extended hold times  Tolerated treatment well  Pt limited with treatment time today due to work- unable to complete exercises  Patient would benefit from continued PT to improve R ankle DF, strength and mobility  Plan: Continue per plan of care  Progress treatment as tolerated         Precautions: R achilles repair     Manuals      Calf STM           DF Mobs, Distal Fib Post Mob      15'                           Neuro Re-Ed                      Ther Ex           Bike   15' 10' 10' 10'     Gastroc-Soleus Stretch      5' ea     Hip abd cybex 55# 3x10 55# 3x10 55# 3x10 55# 30x 55# 30x      Knee ext cybex 40# 3x10 40# 3x10 40# 3x10 40# 30x 45# 30x      HS curl  40# 3x10 40# 3x10 50# 30x 55# 30x      ambulation           Heel raise SL 4x5 30# SL 4x5 30# np np       DL stance half roll 3x30s 3x30s np 3x30s -      Stance on BOSU Blue foam SLS 3x30s Blue foam SLS 3x30s np Blue foam SLS 3x30s Blue foam SLS 3x30s      Leg press 3x10 3x10 45# np 75# 30x DL 75# 30x DL      Tandem walking blue foam np 5x10' FW/BW/SS np 5x10' FW/BW/SS 5x10' FW/BW      ISO PF @ Wall Parallel bars 10x5s 20x // bars np np np      [employer] Pull np 50# 5x np np np      PF isometrics   np        Step up BOSU np x30 np x30 x30      SLS BOSU np np np 30x5" 30x5"      deadlift np np nnp 30x 25# 30x 25#      HR // Bars Slant  20x np np       Star [employer]    5x 5x      RDL Cone Touch SLS     [id number] targets      Ther Activity                                 Gait Training                                 Modalities                                 *

## 2020-09-14 ENCOUNTER — OFFICE VISIT (OUTPATIENT)
Dept: PHYSICAL THERAPY | Age: 35
End: 2020-09-14
Payer: COMMERCIAL

## 2020-09-14 DIAGNOSIS — S86.001A ACHILLES TENDON INJURY, RIGHT, INITIAL ENCOUNTER: Primary | ICD-10-CM

## 2020-09-14 PROCEDURE — 97140 MANUAL THERAPY 1/> REGIONS: CPT | Performed by: PHYSICAL THERAPIST

## 2020-09-14 PROCEDURE — 97110 THERAPEUTIC EXERCISES: CPT | Performed by: PHYSICAL THERAPIST

## 2020-09-14 NOTE — PROGRESS NOTES
Daily Note     Today's date: 2020  Patient name: Nida Sales  : 1985  MRN: [id number]  Referring provider: Chaya Kanner, MD  Dx:   Encounter Diagnosis     ICD-10-CM    1  Achilles tendon injury, right, initial encounter  S86 001A                   Subjective: Pt reports concern about lack of progress      Objective: See treatment diary below      Assessment: Tolerated treatment well  Patient demonstrated fatigue post treatment, would benefit from continued PT and has decreased active/passive dorsiflexion  Pt continues to struggle with SLS and LE force production  Plan: Continue per plan of care  Progress treatment as tolerated         Precautions: R achilles repair     Manuals     Calf STM       TERRELL    DF Mobs, Distal Fib Post Mob      15' TERRELL                          Neuro Re-Ed                      Ther Ex           Bike   15' 10' 10' 10' 10'    Gastroc-Soleus Stretch      5' ea 5' ea    Hip abd cybex 55# 3x10 55# 3x10 55# 3x10 55# 30x 55# 30x  55# 30x    Knee ext cybex 40# 3x10 40# 3x10 40# 3x10 40# 30x 45# 30x  45# 30x    HS curl  40# 3x10 40# 3x10 50# 30x 55# 30x  55# 30x    ambulation           Heel raise SL 4x5 30# SL 4x5 30# np np       DL stance half roll 3x30s 3x30s np 3x30s -      Stance on BOSU Blue foam SLS 3x30s Blue foam SLS 3x30s np Blue foam SLS 3x30s Blue foam SLS 3x30s      Leg press 3x10 3x10 45# np 75# 30x DL 75# 30x DL      Tandem walking blue foam np 5x10' FW/BW/SS np 5x10' FW/BW/SS 5x10' FW/BW      ISO PF @ Wall Parallel bars 10x5s 20x // bars np np np      Sled Push Pull np 50# 5x np np np      PF isometrics   np        Step up BOSU np x30 np x30 x30      SLS BOSU np np np 30x5" 30x5"      deadlift np np nnp 30x 25# 30x 25#      HR // Bars Slant  20x np np       Star Excursion    5x 5x      RDL Cone Touch SLS     [id number] targets      Ther Activity                                 Gait Training                                 Modalities *

## 2020-09-16 ENCOUNTER — OFFICE VISIT (OUTPATIENT)
Dept: PHYSICAL THERAPY | Age: 35
End: 2020-09-16
Payer: COMMERCIAL

## 2020-09-16 DIAGNOSIS — S86.001A ACHILLES TENDON INJURY, RIGHT, INITIAL ENCOUNTER: Primary | ICD-10-CM

## 2020-09-16 PROCEDURE — 97110 THERAPEUTIC EXERCISES: CPT | Performed by: PHYSICAL THERAPIST

## 2020-09-16 PROCEDURE — 97140 MANUAL THERAPY 1/> REGIONS: CPT | Performed by: PHYSICAL THERAPIST

## 2020-09-16 NOTE — PROGRESS NOTES
Daily Note     Today's date: 2020  Patient name: Teofilo Bhakta  : 1985  MRN: [id number]  Referring provider: Consuelo Durbin MD  Dx:   Encounter Diagnosis     ICD-10-CM    1  Achilles tendon injury, right, initial encounter  S86 001A                   Subjective: pt has no complaints      Objective: See treatment diary below      Assessment: Tolerated treatment fair  Patient demonstrated fatigue post treatment, would benefit from continued PT and pt continues to have significant limitation in pf strength      Plan: Continue per plan of care  Progress treatment as tolerated         Precautions: R achilles repair     Manuals    Calf STM       TERRELL TERRELL   DF Mobs, Distal Fib Post Mob      15' TERRELL np                         Neuro Re-Ed                      Ther Ex           Bike   15' 10' 10' 10' 10' 10'   Gastroc-Soleus Stretch      5' ea 5' ea    Hip abd cybex 55# 3x10 55# 3x10 55# 3x10 55# 30x 55# 30x  55# 30x 55# 30x   Knee ext cybex 40# 3x10 40# 3x10 40# 3x10 40# 30x 45# 30x  45# 30x 55# 30x   HS curl  40# 3x10 40# 3x10 50# 30x 55# 30x  55# 30x 55# 30x   ambulation           Heel raise SL 4x5 30# SL 4x5 30# np np       DL stance half roll 3x30s 3x30s np 3x30s -   3x30s   Stance on BOSU Blue foam SLS 3x30s Blue foam SLS 3x30s np Blue foam SLS 3x30s Blue foam SLS 3x30s   Blue foam SLS 3x30s   Leg press 3x10 3x10 45# np 75# 30x DL 75# 30x DL   75# 30x DL   Heel raise x30   Tandem walking blue foam np 5x10' FW/BW/SS np 5x10' FW/BW/SS 5x10' FW/BW   np   ISO PF @ Wall Parallel bars 10x5s 20x // bars np np np   np   Sled Push Pull np 50# 5x np np np   50# 5x   PF isometrics   np     x15   Step up BOSU np x30 np x30 x30   np   SLS BOSU np np np 30x5" 30x5"   np   deadlift np np nnp 30x 25# 30x 25#   np   HR // Bars Slant  20x np np    np   Star Excursion    5x 5x   np   RDL Cone Touch SLS     [id number] targets   np   Ther Activity                                 Gait Training                                 Modalities                                 *

## 2020-09-18 ENCOUNTER — APPOINTMENT (OUTPATIENT)
Dept: PHYSICAL THERAPY | Age: 35
End: 2020-09-18
Payer: COMMERCIAL

## 2020-09-21 ENCOUNTER — OFFICE VISIT (OUTPATIENT)
Dept: PHYSICAL THERAPY | Age: 35
End: 2020-09-21
Payer: COMMERCIAL

## 2020-09-21 DIAGNOSIS — S86.001A ACHILLES TENDON INJURY, RIGHT, INITIAL ENCOUNTER: Primary | ICD-10-CM

## 2020-09-21 PROCEDURE — 97110 THERAPEUTIC EXERCISES: CPT | Performed by: SPECIALIST/TECHNOLOGIST

## 2020-09-21 NOTE — PROGRESS NOTES
Daily Note     Today's date: 2020  Patient name: Avis Thomas  : 1985  MRN: [id number]  Referring provider: Tatiana Smith MD  Dx:   Encounter Diagnosis     ICD-10-CM    1  Achilles tendon injury, right, initial encounter  S86 001A                   Subjective: Pt reports discomfort persists with HR  Pt also reports she continues to be concerned with lack of progress and is seeking 2nd opinions and possible achilles lengthening procedure which she discussed these concerns at length with PT Will Updegrove directly  Objective: See treatment diary below    Assessment: Continued effort to improve PF strength and SL stability to improve mobility and normalization of gait cycle  Tolerated treatment well  Patient demonstrated fatigue post treatment, exhibited good technique with therapeutic exercises and would benefit from continued PT    Plan: Continue per plan of care  Progress treatment as tolerated         Precautions: R achilles repair     Manuals      Calf STM    TERRELL TERRELL np     DF Mobs, Distal Fib Post Mob   15' TERRELL np np                           Neuro Re-Ed                      Ther Ex           Bike 10' 10' 10' 10' 10'      Gastroc-Soleus Stretch   5' ea 5' ea  5' ea     Hip abd cybex 55# 30x 55# 30x  55# 30x 55# 30x np     Knee ext cybex 40# 30x 45# 30x  45# 30x 55# 30x np     HS curl 50# 30x 55# 30x  55# 30x 55# 30x np     ambulation           Heel raise np          DL stance half roll 3x30s -   3x30s 3x30s     Stance on BOSU Blue foam SLS 3x30s Blue foam SLS 3x30s   Blue foam SLS 3x30s Blue foam SLS 3x30s     Leg press 75# 30x DL 75# 30x DL   75# 30x DL   Heel raise x30 75# 30x DL   Heel raise x30     Tandem walking blue foam 5x10' FW/BW/SS 5x10' FW/BW   np 5x 10' FW/BS/SS     ISO PF @ Wall np np   np      Sled Push Pull np np   50# 5x 50# 5x     PF isometrics     x15 x20     Step up BOSU x30 x30   np np     SLS BOSU 30x5" 30x5"   np np     deadlift 30x 25# 30x 25#   np np     HR // Bars Slant np    np np     Star Excursion 5x 5x   np 5x     RDL Cone Touch SLS  [id number] targets   np 20x      Ther Activity                                 Gait Training                                 Modalities                                 *

## 2020-09-23 ENCOUNTER — OFFICE VISIT (OUTPATIENT)
Dept: PHYSICAL THERAPY | Age: 35
End: 2020-09-23
Payer: COMMERCIAL

## 2020-09-23 DIAGNOSIS — S86.001A ACHILLES TENDON INJURY, RIGHT, INITIAL ENCOUNTER: Primary | ICD-10-CM

## 2020-09-23 PROCEDURE — 97110 THERAPEUTIC EXERCISES: CPT | Performed by: PHYSICAL THERAPIST

## 2020-09-23 PROCEDURE — 97140 MANUAL THERAPY 1/> REGIONS: CPT | Performed by: PHYSICAL THERAPIST

## 2020-09-23 NOTE — PROGRESS NOTES
Daily Note     Today's date: 2020  Patient name: Yola Liang  : 1985  MRN: [id number]  Referring provider: Sarah Shanks MD  Dx:   Encounter Diagnosis     ICD-10-CM    1  Achilles tendon injury, right, initial encounter  S86 001A                   Subjective: Pt reports minimal changes      Objective: See treatment diary below      Assessment: Tolerated treatment well  Patient demonstrated fatigue post treatment, would benefit from continued PT and pt continues to be challenged with balance activities and is limited in R LE PF  Plan: Continue per plan of care  Progress treatment as tolerated         Precautions: R achilles repair     Manuals     Calf STM    TERRELL TERRELL np TERRELL    DF Mobs, Distal Fib Post Mob   15' TERRELL np np np                          Neuro Re-Ed                      Ther Ex           Bike 10' 10' 10' 10' 10'  15'    Gastroc-Soleus Stretch   5' ea 5' ea  5' ea 5' ea    Hip abd cybex 55# 30x 55# 30x  55# 30x 55# 30x np np    Knee ext cybex 40# 30x 45# 30x  45# 30x 55# 30x np np    HS curl 50# 30x 55# 30x  55# 30x 55# 30x np np    ambulation           Heel raise np          DL stance half roll 3x30s -   3x30s 3x30s 3x30s    Stance on BOSU Blue foam SLS 3x30s Blue foam SLS 3x30s   Blue foam SLS 3x30s Blue foam SLS 3x30s Blue foam SLS 3x30s    Leg press 75# 30x DL 75# 30x DL   75# 30x DL   Heel raise x30 75# 30x DL   Heel raise x30 75# 30x DL   Heel raise x30    Tandem walking blue foam 5x10' FW/BW/SS 5x10' FW/BW   np 5x 10' FW/BS/SS 5x 10' FW/BS/SS    ISO PF @ Wall np np   np      Sled Push Pull np np   50# 5x 50# 5x np    PF isometrics     x15 x20 x20    Step up BOSU x30 x30   np np x30    SLS BOSU 30x5" 30x5"   np np 3x30s    deadlift 30x 25# 30x 25#   np np 3x10 50#    HR // Bars Slant np    np np np    Star Excursion 5x 5x   np 5x np    RDL Cone Touch SLS  [id number] targets   np 20x  np    Calf eccentrics       Step-3x15 Gait Training                                 Modalities                                 *

## 2020-09-24 ENCOUNTER — OFFICE VISIT (OUTPATIENT)
Dept: PHYSICAL THERAPY | Age: 35
End: 2020-09-24
Payer: COMMERCIAL

## 2020-09-24 DIAGNOSIS — S86.001A ACHILLES TENDON INJURY, RIGHT, INITIAL ENCOUNTER: Primary | ICD-10-CM

## 2020-09-24 PROCEDURE — 97110 THERAPEUTIC EXERCISES: CPT | Performed by: PHYSICAL THERAPIST

## 2020-09-24 NOTE — PROGRESS NOTES
Daily Note     Today's date: 2020  Patient name: Kayleen Officer  : 1985  MRN: [id number]  Referring provider: Jatinder Webster MD  Dx:   Encounter Diagnosis     ICD-10-CM    1  Achilles tendon injury, right, initial encounter  S86 001A                   Subjective: Pt reports minimal changes      Objective: See treatment diary below      Assessment: Tolerated treatment well  Patient demonstrated fatigue post treatment, would benefit from continued PT and pt was able to pf on toes while on trampoline but cannot jump due to lack of force production  Plan: Continue per plan of care  Progress treatment as tolerated         Precautions: R achilles repair     Manuals    Calf STM    TERRELL TERRELL np TERRELL TERRELL   DF Mobs, Distal Fib Post Mob   15' TERRELL np np np np                         Neuro Re-Ed                      Ther Ex           Bike 10' 10' 10' 10' 10'  15' 15'   Gastroc-Soleus Stretch   5' ea 5' ea  5' ea 5' ea 5' ea   Hip abd cybex 55# 30x 55# 30x  55# 30x 55# 30x np np 55# 30x   Knee ext cybex 40# 30x 45# 30x  45# 30x 55# 30x np np 55# 30x   HS curl 50# 30x 55# 30x  55# 30x 55# 30x np np 55# 30x   ambulation           Heel raise np          DL stance half roll 3x30s -   3x30s 3x30s 3x30s np   Stance on BOSU Blue foam SLS 3x30s Blue foam SLS 3x30s   Blue foam SLS 3x30s Blue foam SLS 3x30s Blue foam SLS 3x30s np   Leg press 75# 30x DL 75# 30x DL   75# 30x DL   Heel raise x30 75# 30x DL   Heel raise x30 75# 30x DL   Heel raise x30 75# 30x DL   Heel raise x30   Tandem walking blue foam 5x10' FW/BW/SS 5x10' FW/BW   np 5x 10' FW/BS/SS 5x 10' FW/BS/SS np   ISO PF @ Wall np np   np   np   Sled Push Pull np np   50# 5x 50# 5x np 50# x5 ea   PF isometrics     x15 x20 x20 np   Step up BOSU x30 x30   np np x30 np   SLS BOSU 30x5" 30x5"   np np 3x30s np   deadlift 30x 25# 30x 25#   np np 3x10 50# np   HR // Bars Slant np    np np np np   Star Excursion 5x 5x   np 5x np np   RDL Cone Touch SLS  [id number] targets   np 20x  np np   Calf eccentrics       Step-3x15  Step-3x15      Trampoline        5x30s              Gait Training                                 Modalities                                 *

## 2020-09-28 ENCOUNTER — OFFICE VISIT (OUTPATIENT)
Dept: PHYSICAL THERAPY | Age: 35
End: 2020-09-28
Payer: COMMERCIAL

## 2020-09-28 DIAGNOSIS — S86.001A ACHILLES TENDON INJURY, RIGHT, INITIAL ENCOUNTER: Primary | ICD-10-CM

## 2020-09-28 PROCEDURE — 97110 THERAPEUTIC EXERCISES: CPT | Performed by: PHYSICAL THERAPIST

## 2020-09-28 PROCEDURE — 97140 MANUAL THERAPY 1/> REGIONS: CPT | Performed by: PHYSICAL THERAPIST

## 2020-09-28 NOTE — PROGRESS NOTES
Daily Note     Today's date: 2020  Patient name: Rina Billings  : 1985  MRN: [id number]  Referring provider: Link Medina MD  Dx:   Encounter Diagnosis     ICD-10-CM    1  Achilles tendon injury, right, initial encounter  S86 001A                   Subjective: Pt reports less pain overall    Objective: See treatment diary below      Assessment: Tolerated treatment well  Patient demonstrated fatigue post treatment, would benefit from continued PT and pt has less pain overall with exercises and displays increased ROM  Pt remains limited in balance activities  Plan: Continue per plan of care  Progress treatment as tolerated         Precautions: R achilles repair     Manuals    Calf STM TERRELL       TERRELL   DF Mobs, Distal Fib Post Mob np       np                         Neuro Re-Ed                      Ther Ex           Bike 15'       15'   Gastroc-Soleus Stretch 5'       5' ea   Hip abd cybex 55# 30x       55# 30x   Knee ext cybex 55# 30x       55# 30x   HS curl 55# 30x       55# 30x   ambulation           Heel raise           DL stance half roll 3x30s       np   Stance on BOSU np       np   Leg press 75# 30x DL   Heel raise x30       75# 30x DL   Heel raise x30   Tandem walking blue foam 5x 10'       np   ISO PF @ Wall np       np   Sled Push Pull np       50# x5 ea   PF isometrics np       np   Step up BOSU np       np   SLS BOSU        np   deadlift np       np   HR // Bars Slant np       np   Star Excursion np       np   RDL Cone Touch SLS np       np   Calf eccentrics 3x15        Step-3x15      Trampoline np       5x30s              Gait Training                                 Modalities                                 *

## 2020-09-30 ENCOUNTER — APPOINTMENT (OUTPATIENT)
Dept: PHYSICAL THERAPY | Age: 35
End: 2020-09-30
Payer: COMMERCIAL

## 2020-09-30 ENCOUNTER — LAB REQUISITION (OUTPATIENT)
Dept: LAB | Facility: HOSPITAL | Age: 35
End: 2020-09-30

## 2020-09-30 DIAGNOSIS — Z11.59 ENCOUNTER FOR SCREENING FOR OTHER VIRAL DISEASES: ICD-10-CM

## 2020-09-30 LAB — SARS-COV-2 RNA RESP QL NAA+PROBE: NEGATIVE

## 2020-09-30 PROCEDURE — U0003 INFECTIOUS AGENT DETECTION BY NUCLEIC ACID (DNA OR RNA); SEVERE ACUTE RESPIRATORY SYNDROME CORONAVIRUS 2 (SARS-COV-2) (CORONAVIRUS DISEASE [COVID-19]), AMPLIFIED PROBE TECHNIQUE, MAKING USE OF HIGH THROUGHPUT TECHNOLOGIES AS DESCRIBED BY CMS-2020-01-R: HCPCS | Performed by: PHYSICIAN ASSISTANT

## 2020-10-01 ENCOUNTER — APPOINTMENT (OUTPATIENT)
Dept: PHYSICAL THERAPY | Age: 35
End: 2020-10-01
Payer: COMMERCIAL

## 2020-10-01 ENCOUNTER — OFFICE VISIT (OUTPATIENT)
Dept: PHYSICAL THERAPY | Age: 35
End: 2020-10-01
Payer: COMMERCIAL

## 2020-10-01 DIAGNOSIS — S86.001A ACHILLES TENDON INJURY, RIGHT, INITIAL ENCOUNTER: Primary | ICD-10-CM

## 2020-10-01 PROCEDURE — 97140 MANUAL THERAPY 1/> REGIONS: CPT | Performed by: PHYSICAL THERAPIST

## 2020-10-01 PROCEDURE — 97110 THERAPEUTIC EXERCISES: CPT | Performed by: PHYSICAL THERAPIST

## 2020-10-02 ENCOUNTER — OFFICE VISIT (OUTPATIENT)
Dept: PHYSICAL THERAPY | Age: 35
End: 2020-10-02
Payer: COMMERCIAL

## 2020-10-02 DIAGNOSIS — S86.001A ACHILLES TENDON INJURY, RIGHT, INITIAL ENCOUNTER: Primary | ICD-10-CM

## 2020-10-02 PROCEDURE — 97140 MANUAL THERAPY 1/> REGIONS: CPT | Performed by: PHYSICAL THERAPIST

## 2020-10-02 PROCEDURE — 97110 THERAPEUTIC EXERCISES: CPT | Performed by: PHYSICAL THERAPIST

## 2020-10-05 ENCOUNTER — OFFICE VISIT (OUTPATIENT)
Dept: PHYSICAL THERAPY | Age: 35
End: 2020-10-05
Payer: COMMERCIAL

## 2020-10-05 DIAGNOSIS — S86.001A ACHILLES TENDON INJURY, RIGHT, INITIAL ENCOUNTER: Primary | ICD-10-CM

## 2020-10-05 PROCEDURE — 97140 MANUAL THERAPY 1/> REGIONS: CPT | Performed by: PHYSICAL THERAPIST

## 2020-10-05 PROCEDURE — 97110 THERAPEUTIC EXERCISES: CPT | Performed by: PHYSICAL THERAPIST

## 2020-10-07 ENCOUNTER — OFFICE VISIT (OUTPATIENT)
Dept: PHYSICAL THERAPY | Age: 35
End: 2020-10-07
Payer: COMMERCIAL

## 2020-10-07 DIAGNOSIS — S86.001A ACHILLES TENDON INJURY, RIGHT, INITIAL ENCOUNTER: Primary | ICD-10-CM

## 2020-10-07 PROCEDURE — 97110 THERAPEUTIC EXERCISES: CPT | Performed by: PHYSICAL THERAPIST

## 2020-10-07 PROCEDURE — 97140 MANUAL THERAPY 1/> REGIONS: CPT | Performed by: PHYSICAL THERAPIST

## 2020-10-08 ENCOUNTER — OFFICE VISIT (OUTPATIENT)
Dept: PHYSICAL THERAPY | Age: 35
End: 2020-10-08
Payer: COMMERCIAL

## 2020-10-08 DIAGNOSIS — S86.001A ACHILLES TENDON INJURY, RIGHT, INITIAL ENCOUNTER: Primary | ICD-10-CM

## 2020-10-08 PROCEDURE — 97110 THERAPEUTIC EXERCISES: CPT | Performed by: PHYSICAL THERAPIST

## 2020-10-08 PROCEDURE — 97140 MANUAL THERAPY 1/> REGIONS: CPT | Performed by: PHYSICAL THERAPIST

## 2020-10-12 ENCOUNTER — OFFICE VISIT (OUTPATIENT)
Dept: PHYSICAL THERAPY | Age: 35
End: 2020-10-12
Payer: COMMERCIAL

## 2020-10-12 DIAGNOSIS — S86.001A ACHILLES TENDON INJURY, RIGHT, INITIAL ENCOUNTER: Primary | ICD-10-CM

## 2020-10-12 PROCEDURE — 97110 THERAPEUTIC EXERCISES: CPT | Performed by: PHYSICAL THERAPIST

## 2020-10-14 ENCOUNTER — OFFICE VISIT (OUTPATIENT)
Dept: PHYSICAL THERAPY | Age: 35
End: 2020-10-14
Payer: COMMERCIAL

## 2020-10-14 DIAGNOSIS — S86.001A ACHILLES TENDON INJURY, RIGHT, INITIAL ENCOUNTER: Primary | ICD-10-CM

## 2020-10-14 PROCEDURE — 97110 THERAPEUTIC EXERCISES: CPT | Performed by: PHYSICAL THERAPIST

## 2020-10-14 PROCEDURE — 97140 MANUAL THERAPY 1/> REGIONS: CPT | Performed by: PHYSICAL THERAPIST

## 2020-10-15 ENCOUNTER — OFFICE VISIT (OUTPATIENT)
Dept: PHYSICAL THERAPY | Age: 35
End: 2020-10-15
Payer: COMMERCIAL

## 2020-10-15 DIAGNOSIS — S86.001A ACHILLES TENDON INJURY, RIGHT, INITIAL ENCOUNTER: Primary | ICD-10-CM

## 2020-10-15 PROCEDURE — 97110 THERAPEUTIC EXERCISES: CPT | Performed by: PHYSICAL THERAPIST

## 2020-10-19 ENCOUNTER — OFFICE VISIT (OUTPATIENT)
Dept: PHYSICAL THERAPY | Age: 35
End: 2020-10-19
Payer: COMMERCIAL

## 2020-10-19 DIAGNOSIS — S86.001A ACHILLES TENDON INJURY, RIGHT, INITIAL ENCOUNTER: Primary | ICD-10-CM

## 2020-10-19 PROCEDURE — 97110 THERAPEUTIC EXERCISES: CPT | Performed by: PHYSICAL THERAPIST

## 2020-10-21 ENCOUNTER — OFFICE VISIT (OUTPATIENT)
Dept: PHYSICAL THERAPY | Age: 35
End: 2020-10-21
Payer: COMMERCIAL

## 2020-10-21 DIAGNOSIS — S86.001A ACHILLES TENDON INJURY, RIGHT, INITIAL ENCOUNTER: Primary | ICD-10-CM

## 2020-10-21 PROCEDURE — 97110 THERAPEUTIC EXERCISES: CPT | Performed by: PHYSICAL THERAPIST

## 2020-10-22 ENCOUNTER — OFFICE VISIT (OUTPATIENT)
Dept: PHYSICAL THERAPY | Age: 35
End: 2020-10-22
Payer: COMMERCIAL

## 2020-10-22 DIAGNOSIS — S86.001A ACHILLES TENDON INJURY, RIGHT, INITIAL ENCOUNTER: Primary | ICD-10-CM

## 2020-10-22 PROCEDURE — 97110 THERAPEUTIC EXERCISES: CPT | Performed by: PHYSICAL THERAPIST

## 2020-10-26 ENCOUNTER — OFFICE VISIT (OUTPATIENT)
Dept: PHYSICAL THERAPY | Age: 35
End: 2020-10-26
Payer: COMMERCIAL

## 2020-10-26 DIAGNOSIS — S86.001A ACHILLES TENDON INJURY, RIGHT, INITIAL ENCOUNTER: Primary | ICD-10-CM

## 2020-10-26 PROCEDURE — 97110 THERAPEUTIC EXERCISES: CPT | Performed by: PHYSICAL THERAPIST

## 2020-10-28 ENCOUNTER — OFFICE VISIT (OUTPATIENT)
Dept: PHYSICAL THERAPY | Age: 35
End: 2020-10-28
Payer: COMMERCIAL

## 2020-10-28 DIAGNOSIS — S86.001A ACHILLES TENDON INJURY, RIGHT, INITIAL ENCOUNTER: Primary | ICD-10-CM

## 2020-10-28 PROCEDURE — 97110 THERAPEUTIC EXERCISES: CPT | Performed by: SPECIALIST/TECHNOLOGIST

## 2020-10-29 ENCOUNTER — OFFICE VISIT (OUTPATIENT)
Dept: PHYSICAL THERAPY | Age: 35
End: 2020-10-29
Payer: COMMERCIAL

## 2020-10-29 DIAGNOSIS — S86.001A ACHILLES TENDON INJURY, RIGHT, INITIAL ENCOUNTER: Primary | ICD-10-CM

## 2020-10-29 PROCEDURE — 97110 THERAPEUTIC EXERCISES: CPT | Performed by: PHYSICAL THERAPIST

## 2020-11-02 ENCOUNTER — TELEPHONE (OUTPATIENT)
Dept: OBGYN CLINIC | Facility: HOSPITAL | Age: 35
End: 2020-11-02

## 2020-11-02 ENCOUNTER — OFFICE VISIT (OUTPATIENT)
Dept: PHYSICAL THERAPY | Age: 35
End: 2020-11-02
Payer: COMMERCIAL

## 2020-11-02 DIAGNOSIS — S86.001A ACHILLES TENDON INJURY, RIGHT, INITIAL ENCOUNTER: Primary | ICD-10-CM

## 2020-11-02 PROCEDURE — 97110 THERAPEUTIC EXERCISES: CPT | Performed by: PHYSICAL THERAPIST

## 2020-11-04 ENCOUNTER — OFFICE VISIT (OUTPATIENT)
Dept: PHYSICAL THERAPY | Age: 35
End: 2020-11-04
Payer: COMMERCIAL

## 2020-11-04 DIAGNOSIS — S86.001A ACHILLES TENDON INJURY, RIGHT, INITIAL ENCOUNTER: Primary | ICD-10-CM

## 2020-11-04 PROCEDURE — 97110 THERAPEUTIC EXERCISES: CPT | Performed by: PHYSICAL THERAPIST

## 2020-11-05 ENCOUNTER — OFFICE VISIT (OUTPATIENT)
Dept: PHYSICAL THERAPY | Age: 35
End: 2020-11-05
Payer: COMMERCIAL

## 2020-11-05 DIAGNOSIS — S86.001A ACHILLES TENDON INJURY, RIGHT, INITIAL ENCOUNTER: Primary | ICD-10-CM

## 2020-11-05 PROCEDURE — 97110 THERAPEUTIC EXERCISES: CPT | Performed by: PHYSICAL THERAPIST

## 2020-11-09 ENCOUNTER — OFFICE VISIT (OUTPATIENT)
Dept: PHYSICAL THERAPY | Age: 35
End: 2020-11-09
Payer: COMMERCIAL

## 2020-11-09 DIAGNOSIS — S86.001A ACHILLES TENDON INJURY, RIGHT, INITIAL ENCOUNTER: Primary | ICD-10-CM

## 2020-11-09 PROCEDURE — 97110 THERAPEUTIC EXERCISES: CPT | Performed by: PHYSICAL THERAPIST

## 2020-11-11 ENCOUNTER — OFFICE VISIT (OUTPATIENT)
Dept: PHYSICAL THERAPY | Age: 35
End: 2020-11-11
Payer: COMMERCIAL

## 2020-11-11 DIAGNOSIS — S86.001A ACHILLES TENDON INJURY, RIGHT, INITIAL ENCOUNTER: Primary | ICD-10-CM

## 2020-11-11 PROCEDURE — 97110 THERAPEUTIC EXERCISES: CPT | Performed by: PHYSICAL THERAPIST

## 2020-11-12 ENCOUNTER — OFFICE VISIT (OUTPATIENT)
Dept: PHYSICAL THERAPY | Age: 35
End: 2020-11-12
Payer: COMMERCIAL

## 2020-11-12 DIAGNOSIS — S86.001A ACHILLES TENDON INJURY, RIGHT, INITIAL ENCOUNTER: Primary | ICD-10-CM

## 2020-11-12 PROCEDURE — 97110 THERAPEUTIC EXERCISES: CPT | Performed by: SPECIALIST/TECHNOLOGIST

## 2020-11-16 ENCOUNTER — OFFICE VISIT (OUTPATIENT)
Dept: PHYSICAL THERAPY | Age: 35
End: 2020-11-16
Payer: COMMERCIAL

## 2020-11-16 DIAGNOSIS — S86.001A ACHILLES TENDON INJURY, RIGHT, INITIAL ENCOUNTER: Primary | ICD-10-CM

## 2020-11-16 PROCEDURE — 97110 THERAPEUTIC EXERCISES: CPT | Performed by: PHYSICAL THERAPIST

## 2020-11-18 ENCOUNTER — OFFICE VISIT (OUTPATIENT)
Dept: PHYSICAL THERAPY | Age: 35
End: 2020-11-18
Payer: COMMERCIAL

## 2020-11-18 DIAGNOSIS — S86.001A ACHILLES TENDON INJURY, RIGHT, INITIAL ENCOUNTER: Primary | ICD-10-CM

## 2020-11-18 PROCEDURE — 97164 PT RE-EVAL EST PLAN CARE: CPT | Performed by: PHYSICAL THERAPIST

## 2020-11-18 PROCEDURE — 97110 THERAPEUTIC EXERCISES: CPT | Performed by: PHYSICAL THERAPIST

## 2020-11-19 ENCOUNTER — APPOINTMENT (OUTPATIENT)
Dept: PHYSICAL THERAPY | Age: 35
End: 2020-11-19
Payer: COMMERCIAL

## 2020-11-23 ENCOUNTER — APPOINTMENT (OUTPATIENT)
Dept: PHYSICAL THERAPY | Age: 35
End: 2020-11-23
Payer: COMMERCIAL

## 2020-11-30 ENCOUNTER — APPOINTMENT (OUTPATIENT)
Dept: PHYSICAL THERAPY | Age: 35
End: 2020-11-30
Payer: COMMERCIAL

## 2020-12-02 ENCOUNTER — OFFICE VISIT (OUTPATIENT)
Dept: PHYSICAL THERAPY | Age: 35
End: 2020-12-02
Payer: COMMERCIAL

## 2020-12-02 DIAGNOSIS — S86.001A ACHILLES TENDON INJURY, RIGHT, INITIAL ENCOUNTER: Primary | ICD-10-CM

## 2020-12-02 PROCEDURE — 97110 THERAPEUTIC EXERCISES: CPT | Performed by: PHYSICAL THERAPIST

## 2020-12-03 ENCOUNTER — APPOINTMENT (OUTPATIENT)
Dept: PHYSICAL THERAPY | Age: 35
End: 2020-12-03
Payer: COMMERCIAL

## 2020-12-08 ENCOUNTER — TELEPHONE (OUTPATIENT)
Dept: PSYCHIATRY | Facility: CLINIC | Age: 35
End: 2020-12-08

## 2020-12-08 ENCOUNTER — OFFICE VISIT (OUTPATIENT)
Dept: FAMILY MEDICINE CLINIC | Facility: CLINIC | Age: 35
End: 2020-12-08
Payer: COMMERCIAL

## 2020-12-08 VITALS
TEMPERATURE: 98.5 F | WEIGHT: 239.7 LBS | RESPIRATION RATE: 16 BRPM | DIASTOLIC BLOOD PRESSURE: 80 MMHG | HEIGHT: 68 IN | SYSTOLIC BLOOD PRESSURE: 128 MMHG | BODY MASS INDEX: 36.33 KG/M2 | HEART RATE: 88 BPM

## 2020-12-08 DIAGNOSIS — Z01.419 ENCOUNTER FOR GYNECOLOGICAL EXAMINATION WITH PAPANICOLAOU SMEAR OF CERVIX: Primary | ICD-10-CM

## 2020-12-08 DIAGNOSIS — R41.840 ATTENTION AND CONCENTRATION DEFICIT: ICD-10-CM

## 2020-12-08 PROBLEM — J32.9 SINUSITIS: Status: RESOLVED | Noted: 2019-05-23 | Resolved: 2020-12-08

## 2020-12-08 PROBLEM — S86.001A ACHILLES TENDON INJURY, RIGHT, INITIAL ENCOUNTER: Status: RESOLVED | Noted: 2019-09-26 | Resolved: 2020-12-08

## 2020-12-08 PROCEDURE — 99395 PREV VISIT EST AGE 18-39: CPT | Performed by: PHYSICIAN ASSISTANT

## 2020-12-08 PROCEDURE — G0145 SCR C/V CYTO,THINLAYER,RESCR: HCPCS | Performed by: PHYSICIAN ASSISTANT

## 2020-12-09 ENCOUNTER — OFFICE VISIT (OUTPATIENT)
Dept: PHYSICAL THERAPY | Age: 35
End: 2020-12-09
Payer: COMMERCIAL

## 2020-12-09 ENCOUNTER — TELEPHONE (OUTPATIENT)
Dept: PSYCHIATRY | Facility: CLINIC | Age: 35
End: 2020-12-09

## 2020-12-09 DIAGNOSIS — S86.001A ACHILLES TENDON INJURY, RIGHT, INITIAL ENCOUNTER: Primary | ICD-10-CM

## 2020-12-09 PROCEDURE — 97110 THERAPEUTIC EXERCISES: CPT | Performed by: PHYSICAL THERAPIST

## 2020-12-14 ENCOUNTER — OFFICE VISIT (OUTPATIENT)
Dept: PSYCHIATRY | Facility: CLINIC | Age: 35
End: 2020-12-14
Payer: COMMERCIAL

## 2020-12-14 VITALS — WEIGHT: 239 LBS | HEIGHT: 68 IN | BODY MASS INDEX: 36.22 KG/M2

## 2020-12-14 DIAGNOSIS — F32.1 CURRENT MODERATE EPISODE OF MAJOR DEPRESSIVE DISORDER, UNSPECIFIED WHETHER RECURRENT (HCC): Primary | ICD-10-CM

## 2020-12-14 DIAGNOSIS — F41.9 ANXIETY: ICD-10-CM

## 2020-12-14 DIAGNOSIS — F43.12 CHRONIC POST-TRAUMATIC STRESS DISORDER (PTSD): ICD-10-CM

## 2020-12-14 LAB
LAB AP GYN PRIMARY INTERPRETATION: NORMAL
LAB AP LMP: NORMAL
Lab: NORMAL

## 2020-12-14 PROCEDURE — 90792 PSYCH DIAG EVAL W/MED SRVCS: CPT | Performed by: PHYSICIAN ASSISTANT

## 2020-12-14 RX ORDER — BUPROPION HYDROCHLORIDE 150 MG/1
150 TABLET ORAL EVERY MORNING
Qty: 30 TABLET | Refills: 2 | Status: SHIPPED | OUTPATIENT
Start: 2020-12-14 | End: 2021-01-08 | Stop reason: SDUPTHER

## 2020-12-16 ENCOUNTER — OFFICE VISIT (OUTPATIENT)
Dept: PHYSICAL THERAPY | Age: 35
End: 2020-12-16
Payer: COMMERCIAL

## 2020-12-16 DIAGNOSIS — S86.001A ACHILLES TENDON INJURY, RIGHT, INITIAL ENCOUNTER: Primary | ICD-10-CM

## 2020-12-16 PROCEDURE — 97110 THERAPEUTIC EXERCISES: CPT | Performed by: PHYSICAL THERAPIST

## 2020-12-30 ENCOUNTER — APPOINTMENT (OUTPATIENT)
Dept: PHYSICAL THERAPY | Age: 35
End: 2020-12-30
Payer: COMMERCIAL

## 2021-01-04 ENCOUNTER — APPOINTMENT (OUTPATIENT)
Dept: PHYSICAL THERAPY | Age: 36
End: 2021-01-04
Payer: COMMERCIAL

## 2021-01-06 ENCOUNTER — OFFICE VISIT (OUTPATIENT)
Dept: PHYSICAL THERAPY | Age: 36
End: 2021-01-06
Payer: COMMERCIAL

## 2021-01-06 DIAGNOSIS — S86.001A ACHILLES TENDON INJURY, RIGHT, INITIAL ENCOUNTER: Primary | ICD-10-CM

## 2021-01-06 PROCEDURE — 97110 THERAPEUTIC EXERCISES: CPT | Performed by: PHYSICAL THERAPIST

## 2021-01-06 NOTE — PROGRESS NOTES
Daily Note     Today's date: 2021  Patient name: Tawana Mario  : 1985  MRN: [id number]  Referring provider: Jax Arenas MD  Dx:   Encounter Diagnosis     ICD-10-CM    1  Achilles tendon injury, right, initial encounter  S86 001A                   Subjective: Pt reports pain following plymometric activities that last 2-3 days  States she would like to return to running      Objective: See treatment diary below      Assessment: Tolerated treatment well  Patient demonstrated fatigue post treatment, would benefit from continued PT and pt is progressing with plyometrics, will be seen monthly for status updates      Plan: Continue per plan of care  Progress treatment as tolerated         Precautions: R achilles repair     Manuals                                                     Neuro Re-Ed           Dynadisc  SL, DL stance 5x30s ea SL, DL stance 5x30s ea  SL, DL stance 5x30s ea                                                  Ther Ex                      Gastroc/soleus stretch 3x (5x30s) 3x (5x30s) np        Jump downs 6' 4x5 SL 4" 4x5 np dc       Eccentrics 3x15 3x15         Stance on BOSU 5x30s 5x30s 5x30s 5x30s 5x30s      Trampoline 3x1' 3x1'         Press offs-leg press   3x5 30#        Jumps    4x5 4x5      Leg press    100# 3x15 100# 3x15      dynadisc    5x30s                                                                                                                     Gait Training                                 Modalities                                 *

## 2021-01-08 ENCOUNTER — OFFICE VISIT (OUTPATIENT)
Dept: PSYCHIATRY | Facility: CLINIC | Age: 36
End: 2021-01-08
Payer: COMMERCIAL

## 2021-01-08 DIAGNOSIS — F32.1 CURRENT MODERATE EPISODE OF MAJOR DEPRESSIVE DISORDER, UNSPECIFIED WHETHER RECURRENT (HCC): Primary | ICD-10-CM

## 2021-01-08 DIAGNOSIS — F43.12 CHRONIC POST-TRAUMATIC STRESS DISORDER (PTSD): ICD-10-CM

## 2021-01-08 DIAGNOSIS — F41.9 ANXIETY: ICD-10-CM

## 2021-01-08 PROCEDURE — 99214 OFFICE O/P EST MOD 30 MIN: CPT | Performed by: PHYSICIAN ASSISTANT

## 2021-01-08 RX ORDER — BUPROPION HYDROCHLORIDE 300 MG/1
300 TABLET ORAL EVERY MORNING
Qty: 30 TABLET | Refills: 2 | Status: SHIPPED | OUTPATIENT
Start: 2021-01-08 | End: 2021-02-11

## 2021-01-08 NOTE — PSYCH
This note was not shared with the patient due to patient requested    PROGRESS NOTE        [facility]      Name and Date of Birth:  Vikram Bajwa 37 y o  1985    Date of Visit: 01/08/21    SUBJECTIVE:  Patient seen for follow-up of major depression, anxiety, chronic PTSD and medication check  Patient states that initially with starting the Wellbutrin she noted some increase in heart rate for a couple days but this has completely resolved  No other adverse effects noted  She also unfortunately has not noted much of a benefit though  Continues with anxiety and depression  No suicidal ideation but reports that death wish at times  Little enjoyment  States that she did spend the holidays with her friend's family which she was able to enjoy  Sleep and appetite are adequate  Denies any recent flashbacks  No panic attacks  Compliant with her medication  Physically she continues in treatment for her Achilles  States that this has been gradually improving  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  States that she met with her residency adviser and discussed restarting in July 2021  She will have to repeat the year  She was referred to a therapist specifically for EMDR to help with her past trauma  She is meeting with this therapist tomorrow virtually  She is concerned about seeing a therapist though virtually  She does continue to meet with her other therapist, Esteban Almonte, on a regular basis  Also states that she has somebody who is helping her with organization  She denies suicidal ideation, intent or plan at present, has no suicidal ideation, intent or plan at present  She denies any auditory hallucinations and visual hallucinations, denies any other delusional thinking, denies any delusional thinking  She denies any side effects from medications      HPI ROS Appetite Changes and Sleep: normal appetite, normal sleep    Review Of Systems:      Constitutional Negative   ENT Negative   Cardiovascular Negative   Respiratory Negative   Gastrointestinal Negative   Genitourinary Negative   Musculoskeletal Achilles pain   Integumentary Negative   Neurological Negative   Endocrine Negative   Other Symptoms Negative and None       Laboratory Results: No results found for this or any previous visit      Substance Abuse History:    Social History     Substance and Sexual Activity   Drug Use No       Family Psychiatric History:     Family History   Problem Relation Age of Onset    Depression Mother     Hypertension Mother     Diabetes Father     Hypertension Father     Cancer Father     Heart failure Father     Leukemia Father     No Known Problems Sister     No Known Problems Brother     Diabetes Paternal Grandmother     Hypertension Paternal Grandmother     Alcohol abuse Maternal Uncle     Depression Maternal Uncle     Mental illness Maternal Uncle     Alcohol abuse Cousin     Depression Cousin     Mental illness Cousin        The following portions of the patient's history were reviewed and updated as appropriate: past family history, past medical history, past social history, past surgical history and problem list     Social History     Socioeconomic History    Marital status: Single     Spouse name: Not on file    Number of children: Not on file    Years of education: Not on file    Highest education level: Not on file   Occupational History    Not on file   Social Needs    Financial resource strain: Not on file    Food insecurity     Worry: Not on file     Inability: Not on file   [employer] needs     Medical: Not on file     Non-medical: Not on file   Tobacco Use    Smoking status: Never Smoker    Smokeless tobacco: Never Used   Substance and Sexual Activity    Alcohol use: Yes     Comment: social    Drug use: No    Sexual activity: Not Currently     Partners: Male   Lifestyle  Physical activity     Days per week: Not on file     Minutes per session: Not on file    Stress: Not on file   Relationships    Social connections     Talks on phone: Not on file     Gets together: Not on file     Attends Jehovah's witness service: Not on file     Active member of club or organization: Not on file     Attends meetings of clubs or organizations: Not on file     Relationship status: Not on file    Intimate partner violence     Fear of current or ex partner: Not on file     Emotionally abused: Not on file     Physically abused: Not on file     Forced sexual activity: Not on file   Other Topics Concern    Not on file   Social History Narrative    Not on file     Social History     Social History Narrative    Not on file        Social History     Tobacco History     Smoking Status  Never Smoker    Smokeless Tobacco Use  Never Used          Alcohol History     Alcohol Use Status  Yes Comment  social          Drug Use     Drug Use Status  No          Sexual Activity     Sexually Active  Not Currently Partners  Male          Activities of Daily Living    Not Asked                     OBJECTIVE:     Mental Status Evaluation:    Appearance age appropriate, casually dressed   Behavior Good eye contact, cooperative   Speech normal volume, normal pitch   Mood Depressed, anxious   Affect Concurrent   Thought Processes logical   Associations intact associations   Thought Content Negative   Perceptual Disturbances: none   Abnormal Thoughts  Risk Potential Suicidal ideation - None, death wish at times  Homicidal ideation - None  Potential for aggression - No   Orientation oriented to person, place, time/date and situation   Memory recent and remote memory grossly intact   Cosciousness alert and awake   Attention Span attention span and concentration are fair   Intellect Appears to be of above Average Intelligence   Insight age appropriate    Judgement good    Muscle Strength and  Gait Stable gait   Language Within normal limits   Fund of Knowledge displays adequate knowledge of current events   Pain Achilles pain   Pain Scale Pain       Assessment/Plan:       Diagnoses and all orders for this visit:    Current moderate episode of major depressive disorder, unspecified whether recurrent (HCC)  -     buPROPion (WELLBUTRIN XL) 300 mg 24 hr tablet; Take 1 tablet (300 mg total) by mouth every morning    Anxiety    Chronic post-traumatic stress disorder (PTSD)          Treatment Recommendations/Precautions:  Increase Wellbutrin XL to 300 mg q a m  Will follow with therapist as scheduled, meeting with EMDR therapist tomorrow virtually  Will follow-up in 4-6 weeks  Plan to return to residency program in July of 2021  Patient will notify me if she needs a letter or papers to be completed        Risks/Benefits      Risks, Benefits And Possible Side Effects Of Medications:    Risks, benefits, and possible side effects of medications explained to patient and patient verbalizes understanding and agreement for treatment      Controlled Medication Discussion:     Not applicable    Psychotherapy Provided:     Individual psychotherapy provided: No

## 2021-01-11 ENCOUNTER — APPOINTMENT (OUTPATIENT)
Dept: PHYSICAL THERAPY | Age: 36
End: 2021-01-11
Payer: COMMERCIAL

## 2021-01-13 ENCOUNTER — APPOINTMENT (OUTPATIENT)
Dept: PHYSICAL THERAPY | Age: 36
End: 2021-01-13
Payer: COMMERCIAL

## 2021-01-18 ENCOUNTER — TELEPHONE (OUTPATIENT)
Dept: PSYCHIATRY | Facility: CLINIC | Age: 36
End: 2021-01-18

## 2021-01-18 NOTE — TELEPHONE ENCOUNTER
I emailed a release form to saira  For her [facility] paperwork  She said once completed if a copy can be sent to her email ([email or web address]), and fax to HR  asira is also requesting a letter that explains the length of time being out to go along with the paperwork

## 2021-02-02 DIAGNOSIS — F32.1 CURRENT MODERATE EPISODE OF MAJOR DEPRESSIVE DISORDER, UNSPECIFIED WHETHER RECURRENT (HCC): ICD-10-CM

## 2021-02-02 RX ORDER — BUPROPION HYDROCHLORIDE 150 MG/1
TABLET ORAL
Qty: 30 TABLET | Refills: 2 | OUTPATIENT
Start: 2021-02-02

## 2021-02-03 ENCOUNTER — APPOINTMENT (OUTPATIENT)
Dept: PHYSICAL THERAPY | Age: 36
End: 2021-02-03
Payer: COMMERCIAL

## 2021-02-05 ENCOUNTER — OFFICE VISIT (OUTPATIENT)
Dept: PHYSICAL THERAPY | Age: 36
End: 2021-02-05
Payer: COMMERCIAL

## 2021-02-05 DIAGNOSIS — S86.001A ACHILLES TENDON INJURY, RIGHT, INITIAL ENCOUNTER: Primary | ICD-10-CM

## 2021-02-05 PROCEDURE — 97110 THERAPEUTIC EXERCISES: CPT | Performed by: PHYSICAL THERAPIST

## 2021-02-05 NOTE — PROGRESS NOTES
Daily Note     Today's date: 2021  Patient name: Ry Hager  : 1985  MRN: [id number]  Referring provider: Irma Lorenzo MD  Dx:   Encounter Diagnosis     ICD-10-CM    1  Achilles tendon injury, right, initial encounter  S86 001A                   Subjective: Pt reports DOMS from gym prevents her from exercising more than 1x week      Objective: See treatment diary below      Assessment: Tolerated treatment well  Patient demonstrated fatigue post treatment, would benefit from continued PT and pt was advised to decreased intensity, increase frequency of exercise routine  Plan: Continue per plan of care  Progress treatment as tolerated         Precautions: R achilles repair     Manuals                                                    Neuro Re-Ed           Dynadisc  SL, DL stance 5x30s ea SL, DL stance 5x30s ea  SL, DL stance 5x30s ea SL, DL stance 5x30s ea                                                 Ther Ex                      Gastroc/soleus stretch 3x (5x30s) 3x (5x30s) np        Jump downs 6' 4x5 SL 4" 4x5 np dc       Eccentrics 3x15 3x15         Stance on BOSU 5x30s 5x30s 5x30s 5x30s 5x30s 5x30s     Trampoline 3x1' 3x1'         Press offs-leg press   3x5 30#        Jumps    4x5 4x5      Leg press    100# 3x15 100# 3x15 Calf raise-4x5 ea     dynadisc    5x30s                                                                                                                     Gait Training                                 Modalities                                 *

## 2021-02-06 ENCOUNTER — IMMUNIZATIONS (OUTPATIENT)
Dept: FAMILY MEDICINE CLINIC | Facility: HOSPITAL | Age: 36
End: 2021-02-06

## 2021-02-06 DIAGNOSIS — Z23 ENCOUNTER FOR IMMUNIZATION: Primary | ICD-10-CM

## 2021-02-06 PROCEDURE — 91300 SARS-COV-2 / COVID-19 MRNA VACCINE (PFIZER-BIONTECH) 30 MCG: CPT

## 2021-02-06 PROCEDURE — 0001A SARS-COV-2 / COVID-19 MRNA VACCINE (PFIZER-BIONTECH) 30 MCG: CPT

## 2021-02-11 ENCOUNTER — TELEMEDICINE (OUTPATIENT)
Dept: PSYCHIATRY | Facility: CLINIC | Age: 36
End: 2021-02-11
Payer: COMMERCIAL

## 2021-02-11 DIAGNOSIS — F43.12 CHRONIC POST-TRAUMATIC STRESS DISORDER (PTSD): ICD-10-CM

## 2021-02-11 DIAGNOSIS — F32.1 CURRENT MODERATE EPISODE OF MAJOR DEPRESSIVE DISORDER, UNSPECIFIED WHETHER RECURRENT (HCC): Primary | ICD-10-CM

## 2021-02-11 DIAGNOSIS — R41.840 ATTENTION AND CONCENTRATION DEFICIT: ICD-10-CM

## 2021-02-11 PROCEDURE — 99443 PR PHYS/QHP TELEPHONE EVALUATION 21-30 MIN: CPT | Performed by: PHYSICIAN ASSISTANT

## 2021-02-11 NOTE — PSYCH
Virtual Brief Visit    Assessment/Plan:    Problem List Items Addressed This Visit     None      Visit Diagnoses     Current moderate episode of major depressive disorder, unspecified whether recurrent ([facility] [address] )    -  Primary    Relevant Medications    Vortioxetine HBr (Trintellix) 5 MG tablet    Attention and concentration deficit        Chronic post-traumatic stress disorder (PTSD)        Relevant Medications    Vortioxetine HBr (Trintellix) 5 MG tablet                Reason for visit is   Chief Complaint   Patient presents with    Follow-up    Medication Management    Virtual Brief Visit        Encounter provider Gretel Brito PA-C    Provider located at [facility] [id number]    Recent Visits  No visits were found meeting these conditions  Showing recent visits within past 7 days and meeting all other requirements     Today's Visits  Date Type Provider Dept   02/11/21 Telemedicine [facility] today's visits and meeting all other requirements     Future Appointments  No visits were found meeting these conditions  Showing future appointments within next 150 days and meeting all other requirements        After connecting through telephone, the patient was identified by name and date of birth  Chepe Gonzalez was informed that this is a telemedicine visit and that the visit is being conducted through telephone  My office door was closed  No one else was in the room  She acknowledged consent and understanding of privacy and security of the platform  The patient has agreed to participate and understands she can discontinue the visit at any time  Patient is aware this is a billable service  Subjective    Chepe Gonzalez is a 37 y o  female with depression, PTSD  HPI   Patient seen for follow-up and medication check    She has been on Wellbutrin  mg daily for over a month   She has not noted any improvement as far as her mood  States initially with the increase she has some insomnia but this has resolved  She continues with depression and anxiety  She is continuing to see her therapist on a regular basis as well as a therapist for EMDR due to her past trauma  Continues with difficulty with focus and concentration  States that she is having difficulty maintaining her focus for more than 30 minutes  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 and this has been challenging  We discussed her anxiety and PTSD likely causing her attention deficits  Her attention and concentration should improve as her anxiety, depression and PTSD he is appropriately treated  She has been adhering to routine  Has been Studying during the days and 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  Motivation can be difficult at times  She denies any suicidal or homicidal ideation  Denies death wish  physically she has been doing okay  She continues in physical therapy rehabilitating her Achilles tendon  She has been doing low-impact exercise  We discussed exercise also being beneficial for her focus and concentration as well as her anxiety  Past Medical History:   Diagnosis Date    Chronic sinus infection     Depression     Post-op pain        Past Surgical History:   Procedure Laterality Date    ESOPHAGOGASTRODUODENOSCOPY N/A 8/15/2018    Procedure: ESOPHAGOGASTRODUODENOSCOPY (EGD); Surgeon: Zachary Mckeon MD;  [facility];   Service: Gastroenterology    HAND SURGERY Right     KNEE ARTHROSCOPY Left     lateral meniscus tear, ACL tear    KNEE SURGERY Left     MA REPAIR ACHILLES TENDON,PRIMARY Right 9/30/2019    Procedure: REPAIR TENDON ACHILLES;  Surgeon: Delaney Lin MD;  Location: [facility] OR;  Service: Orthopedics    UPPER GASTROINTESTINAL ENDOSCOPY         Current Outpatient Medications   Medication Sig Dispense Refill    fexofenadine (ALLEGRA) 180 MG tablet Take 1 tablet (180 mg total) by mouth daily  0    ibuprofen (MOTRIN) 800 mg tablet Take by mouth every 6 (six) hours as needed for mild pain      levonorgestrel (MIRENA) 20 MCG/24HR IUD 1 Intra Uterine Device by Intrauterine route once for 1 dose (Patient taking differently: 1 each by Intrauterine route once ) 1 each 0    mometasone (NASONEX) 50 mcg/act nasal spray 2 sprays into each nostril daily 17 g 5    Vortioxetine HBr (Trintellix) 5 MG tablet Take 1 tablet (5 mg total) by mouth daily 30 tablet 2     No current facility-administered medications for this visit  No Known Allergies    Review of Systems     As noted in HPI  Fair sleep, fair appetite     Mental status exam:   Speech is clear and coherent, normal rate and volume   Affect is dysthymic, anxious  Linear and coherent thought process   No suicidal or homicidal ideation   No psychotic signs or symptoms, no hallucinations or delusions   Cognition is intact   Insight and judgment is intact    There were no vitals filed for this visit  Treatment plan as follows: Wellbutrin  mg q a m  decrease every other day for week and then discontinue  Trial Trintellix 5 mg daily, discussed potential adverse effect of nausea   If no improvement after two weeks patient will call me and we will discuss a dose increase   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  Continue with individual therapy with Juan Manuel Mcclain      I spent 25 minutes directly with the patient during this visit   This note was not shared with the patient due to patient requested    VIRTUAL VISIT 98 Sahra Casanova acknowledges that she has consented to an online visit or consultation   She understands that the online visit is based solely on information provided by her, and that, in the absence of a face-to-face physical evaluation by the physician, the diagnosis she receives is both limited and provisional in terms of accuracy and completeness  This is not intended to replace a full medical face-to-face evaluation by the physician  Farshad Puckett understands and accepts these terms

## 2021-02-23 ENCOUNTER — TELEPHONE (OUTPATIENT)
Dept: OBGYN CLINIC | Facility: HOSPITAL | Age: 36
End: 2021-02-23
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
## 2021-02-28 ENCOUNTER — IMMUNIZATIONS (OUTPATIENT)
Dept: FAMILY MEDICINE CLINIC | Facility: HOSPITAL | Age: 36
End: 2021-02-28

## 2021-02-28 DIAGNOSIS — Z23 ENCOUNTER FOR IMMUNIZATION: Primary | ICD-10-CM

## 2021-02-28 PROCEDURE — 0002A SARS-COV-2 / COVID-19 MRNA VACCINE (PFIZER-BIONTECH) 30 MCG: CPT

## 2021-02-28 PROCEDURE — 91300 SARS-COV-2 / COVID-19 MRNA VACCINE (PFIZER-BIONTECH) 30 MCG: CPT

## 2021-03-03 ENCOUNTER — TELEPHONE (OUTPATIENT)
Dept: PSYCHIATRY | Facility: CLINIC | Age: 36
End: 2021-03-03

## 2021-03-03 ENCOUNTER — OFFICE VISIT (OUTPATIENT)
Dept: PHYSICAL THERAPY | Age: 36
End: 2021-03-03
Payer: COMMERCIAL

## 2021-03-03 DIAGNOSIS — S86.001A ACHILLES TENDON INJURY, RIGHT, INITIAL ENCOUNTER: Primary | ICD-10-CM

## 2021-03-03 PROCEDURE — 97110 THERAPEUTIC EXERCISES: CPT | Performed by: PHYSICAL THERAPIST

## 2021-03-03 NOTE — TELEPHONE ENCOUNTER
Radha called would like to talk to you regarding the medication Trintellix  Patient said it makes her feel less attentive and more anxious          Contact# [phone]

## 2021-03-03 NOTE — PROGRESS NOTES
Daily Note     Today's date: 3/3/2021  Patient name: Farshad Puckett  : 1985  MRN: [id number]  Referring provider: Sohail Coker MD  Dx:   Encounter Diagnosis     ICD-10-CM    1  Achilles tendon injury, right, initial encounter  [id number] 001A                   Subjective: Pt reports continued decreased activity tolerance due to pain      Objective: See treatment diary below      Assessment: Tolerated treatment fair  Patient demonstrated fatigue post treatment, would benefit from continued PT and as pt continues to struggle with pain and decreased RLE force production, pt was instructed on desensistization techniques  Pt will be seen in two weeks and we will determine POC moving forward at that time         Plan: see above     Precautions: R achilles repair     Manuals   12/9 12/16 1/6 2/5 3/3                                                Neuro Re-Ed           Dynadisc  SL, DL stance 5x30s ea SL, DL stance 5x30s ea  SL, DL stance 5x30s ea SL, DL stance 5x30s ea SL, DL stance 5x30s ea                                                Ther Ex                      Gastroc/soleus stretch 3x (5x30s) 3x (5x30s) np    3x (5x30s)    Jump downs 6' 4x5 SL 4" 4x5 np dc       Eccentrics 3x15 3x15         Stance on BOSU 5x30s 5x30s 5x30s 5x30s 5x30s 5x30s np    Trampoline 3x1' 3x1'         Press offs-leg press   3x5 30#        Jumps    4x5 4x5      Leg press    100# 3x15 100# 3x15 Calf raise-4x5 ea Calf raise-4x5 ea    dynadisc    5x30s                                                                                                                     Gait Training                                 Modalities                                 *

## 2021-03-04 DIAGNOSIS — F41.9 ANXIETY: Primary | ICD-10-CM

## 2021-03-04 DIAGNOSIS — R41.840 ATTENTION AND CONCENTRATION DEFICIT: ICD-10-CM

## 2021-03-04 DIAGNOSIS — J30.1 SEASONAL ALLERGIC RHINITIS DUE TO POLLEN: ICD-10-CM

## 2021-03-04 DIAGNOSIS — F32.1 CURRENT MODERATE EPISODE OF MAJOR DEPRESSIVE DISORDER, UNSPECIFIED WHETHER RECURRENT (HCC): ICD-10-CM

## 2021-03-04 RX ORDER — DESVENLAFAXINE 25 MG/1
25 TABLET, EXTENDED RELEASE ORAL DAILY
Qty: 30 TABLET | Refills: 0 | Status: SHIPPED | OUTPATIENT
Start: 2021-03-04 | End: 2021-03-11 | Stop reason: SDUPTHER

## 2021-03-04 NOTE — TELEPHONE ENCOUNTER
Spoke with Corrine Aaron who reports overall feeling worse since starting Trintellix  She has been taking 5 mg once a day  Will discontinue due to adverse effects  Had been on Lexapro in the past as well as Wellbutrin which was not beneficial Trial desvenlafaxine 25 mg once a day  Due to medication intolerance consider gene testing which was discussed  She has follow-up appointment next week

## 2021-03-11 ENCOUNTER — TELEMEDICINE (OUTPATIENT)
Dept: PSYCHIATRY | Facility: CLINIC | Age: 36
End: 2021-03-11
Payer: COMMERCIAL

## 2021-03-11 DIAGNOSIS — R41.840 ATTENTION AND CONCENTRATION DEFICIT: ICD-10-CM

## 2021-03-11 DIAGNOSIS — F43.12 CHRONIC POST-TRAUMATIC STRESS DISORDER (PTSD): ICD-10-CM

## 2021-03-11 DIAGNOSIS — F41.9 ANXIETY: ICD-10-CM

## 2021-03-11 DIAGNOSIS — F32.1 CURRENT MODERATE EPISODE OF MAJOR DEPRESSIVE DISORDER, UNSPECIFIED WHETHER RECURRENT (HCC): Primary | ICD-10-CM

## 2021-03-11 PROCEDURE — 99442 PR PHYS/QHP TELEPHONE EVALUATION 11-20 MIN: CPT | Performed by: PHYSICIAN ASSISTANT

## 2021-03-11 RX ORDER — DESVENLAFAXINE 25 MG/1
25 TABLET, EXTENDED RELEASE ORAL DAILY
Qty: 30 TABLET | Refills: 1 | Status: SHIPPED | OUTPATIENT
Start: 2021-03-11 | End: 2021-04-05

## 2021-03-11 NOTE — PSYCH
This note was not shared with the patient due to patient requested    Virtual Brief Visit    Assessment/Plan:    Problem List Items Addressed This Visit     None      Visit Diagnoses     Current moderate episode of major depressive disorder, unspecified whether recurrent ([address] )    -  Primary    Relevant Medications    Desvenlafaxine Succinate ER 25 MG TB24    Chronic post-traumatic stress disorder (PTSD)        Relevant Medications    Desvenlafaxine Succinate ER 25 MG TB24    Anxiety        Relevant Medications    Desvenlafaxine Succinate ER 25 MG TB24    Attention and concentration deficit        Relevant Medications    Desvenlafaxine Succinate ER 25 MG TB24                Reason for visit is   Chief Complaint   Patient presents with    Follow-up    Medication Management    Virtual Brief Visit        Encounter provider Avis Calzada PA-C    Provider located at [facility] [address]    Recent Visits  No visits were found meeting these conditions  Showing recent visits within past 7 days and meeting all other requirements     Today's Visits  Date Type Provider Dept   03/11/21 Telemedicine [facility] today's visits and meeting all other requirements     Future Appointments  No visits were found meeting these conditions  Showing future appointments within next 150 days and meeting all other requirements        After connecting through telephone, the patient was identified by name and date of birth  Shreya Gallardo was informed that this is a telemedicine visit and that the visit is being conducted through telephone  My office door was closed  No one else was in the room  She acknowledged consent and understanding of privacy and security of the platform  The patient has agreed to participate and understands she can discontinue the visit at any time      Patient is aware this is a billable service  Subjective    Piyush Aden is a 37 y o  female  With depression, anxiety  HPI     [place] was seen for follow-up of depression, anxiety, chronic PTSD and medication change  She was unable to tolerate Trintellix due to adverse effects  She felt more anxious and stimulated with taking Trintellix  Continues with poor focus and difficulty with concentration  In addition to anxiety  My brain feels "super foggy"  Reports that her sleep pattern is fluctuating  States that she typically the has a hard time falling asleep  Denies any nightmares or significant anxiety at night  Her appetite has been adequate  Reports limited motivation and interest during the day  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 but has been having difficulty with that  She did not past the  Exam this past January  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  Continues to see her therapist for EMDR  Also seeing an acupuncturist   Physically she reports that she continues with Achilles tendon pain and is following with Physical therapy  Unfortunately her exercise capability is limited due to this  No new medications   Or other medical issues noted  Past Medical History:   Diagnosis Date    Chronic sinus infection     Depression     Post-op pain        Past Surgical History:   Procedure Laterality Date    ESOPHAGOGASTRODUODENOSCOPY N/A 8/15/2018    Procedure: ESOPHAGOGASTRODUODENOSCOPY (EGD); Surgeon: Nuno Duke MD;  [facility];   Service: Gastroenterology    HAND SURGERY Right     KNEE ARTHROSCOPY Left     lateral meniscus tear, ACL tear    KNEE SURGERY Left     MO REPAIR ACHILLES TENDON,PRIMARY Right 9/30/2019    Procedure: REPAIR TENDON ACHILLES;  Surgeon: Elke Carter MD;  [facility];  Service: Orthopedics    UPPER GASTROINTESTINAL ENDOSCOPY         Current Outpatient Medications   Medication Sig Dispense Refill    Desvenlafaxine Succinate ER 25 MG TB24 Take 1 tablet (25 mg total) by mouth daily 30 tablet 1    fexofenadine (ALLEGRA) 180 MG tablet Take 1 tablet (180 mg total) by mouth daily  0    ibuprofen (MOTRIN) 800 mg tablet Take by mouth every 6 (six) hours as needed for mild pain      levonorgestrel (MIRENA) 20 MCG/24HR IUD 1 Intra Uterine Device by Intrauterine route once for 1 dose (Patient taking differently: 1 each by Intrauterine route once ) 1 each 0    mometasone (NASONEX) 50 mcg/act nasal spray 2 sprays into each nostril daily 17 g 5     No current facility-administered medications for this visit  No Known Allergies    Review of Systems   As noted in HPI      Mental status exam:   Speech is clear and coherent, normal rate and volume   Mood is anxious, dysthymic   No suicidal homicidal ideation   No psychotic signs or symptoms, no hallucinations or delusions   Reports decreased focus and concentration   Cognition appears at her baseline  Insight and judgment is intact    Medications as follows    Trintellix discontinued due to adverse effect  Start Pristiq 25 mg daily   Discussed benefits of serotonin norepinephrine agent with patient and norepinephrine benefiting her focus and concentration   Will follow-up in 2-3 weeks for rebound and plan to titrate medication as tolerated    There were no vitals filed for this visit  I spent 20 minutes directly with the patient during this visit    [address] acknowledges that she has consented to an online visit or consultation  She understands that the online visit is based solely on information provided by her, and that, in the absence of a face-to-face physical evaluation by the physician, the diagnosis she receives is both limited and provisional in terms of accuracy and completeness  This is not intended to replace a full medical face-to-face evaluation by the physician  Renetta Seymour understands and accepts these terms

## 2021-03-17 ENCOUNTER — OFFICE VISIT (OUTPATIENT)
Dept: PHYSICAL THERAPY | Age: 36
End: 2021-03-17
Payer: COMMERCIAL

## 2021-03-17 DIAGNOSIS — S86.001A ACHILLES TENDON INJURY, RIGHT, INITIAL ENCOUNTER: Primary | ICD-10-CM

## 2021-03-17 PROCEDURE — 97140 MANUAL THERAPY 1/> REGIONS: CPT | Performed by: SPECIALIST/TECHNOLOGIST

## 2021-03-17 PROCEDURE — 97110 THERAPEUTIC EXERCISES: CPT | Performed by: SPECIALIST/TECHNOLOGIST

## 2021-03-17 NOTE — PROGRESS NOTES
Daily Note     Today's date: 3/17/2021  Patient name: Beth Lancaster  : 1985  MRN: [id number]  Referring provider: Lissette Sorensen MD  Dx:   Encounter Diagnosis     ICD-10-CM    1  Achilles tendon injury, right, initial encounter  S86 001A                   Subjective: Pt continues to report it takes 1 weeks to recover from jumping activity  Pt stated she has metabolic test upcoming  Objective: See treatment diary below      Assessment: Tolerated treatment well  Patient demonstrated fatigue post treatment, would benefit from continued PT and pt was advised to continue to perform HEP, including calf eccentrics  Plan: Continue per plan of care  Progress treatment as tolerated         Precautions: R achilles repair     Manuals   12/9 12/16 1/6 2/5 3/3 3/17   Calf STM        TERRELL                                    Neuro Re-Ed           Dynadisc  SL, DL stance 5x30s ea SL, DL stance 5x30s ea  SL, DL stance 5x30s ea SL, DL stance 5x30s ea SL, DL stance 5x30s ea                                                Ther Ex                      Gastroc/soleus stretch 3x (5x30s) 3x (5x30s) np    3x (5x30s) 3x (5x30s)   Jump downs 6' 4x5 SL 4" 4x5 np dc       Eccentrics 3x15 3x15      3x15 ea   Stance on BOSU 5x30s 5x30s 5x30s 5x30s 5x30s 5x30s np    Trampoline 3x1' 3x1'         Press offs-leg press   3x5 30#        Jumps    4x5 4x5      Leg press    100# 3x15 100# 3x15 Calf raise-4x5 ea Calf raise-4x5 ea    dynadisc    5x30s       Star exc        3x ea                                                                                                      Gait Training                                 Modalities                                 *

## 2021-03-31 ENCOUNTER — OFFICE VISIT (OUTPATIENT)
Dept: PHYSICAL THERAPY | Age: 36
End: 2021-03-31
Payer: COMMERCIAL

## 2021-03-31 DIAGNOSIS — S86.001A ACHILLES TENDON INJURY, RIGHT, INITIAL ENCOUNTER: Primary | ICD-10-CM

## 2021-03-31 PROCEDURE — 97140 MANUAL THERAPY 1/> REGIONS: CPT | Performed by: SPECIALIST/TECHNOLOGIST

## 2021-03-31 PROCEDURE — 97110 THERAPEUTIC EXERCISES: CPT | Performed by: SPECIALIST/TECHNOLOGIST

## 2021-03-31 NOTE — PROGRESS NOTES
Daily Note     Today's date: 3/31/2021  Patient name: Denny Lea  : 1985  MRN: [id number]  Referring provider: Ivonne Lauren MD  Dx:   Encounter Diagnosis     ICD-10-CM    1  Achilles tendon injury, right, initial encounter  S86 001A                   Subjective: Pt reports compliance with HEP and is seeking additonal orthopedic opinion at a different health network in upcoming weeks   Objective: See treatment diary below    Assessment: Tolerated treatment well  Patient demonstrated fatigue post treatment, exhibited good technique with therapeutic exercises and would benefit from continued PT  FOTO is currently below predicted values at this time  Pt would benefit from continued PT to improve RLE ROM and strength to return to occupational duties and recreational activities as desired  Plan: Continue per plan of care  Progress treatment as tolerated         Precautions: R achilles repair     Manuals   12/9 12/16 1/6 2/5 3/3 3/17 3/31   Calf STM        TERRELL TERRELL   AnkleROM          TERRELL   FOTO SCORE INTIAL: 38         47 (+16)               Neuro Re-Ed            Dynadisc  SL, DL stance 5x30s ea SL, DL stance 5x30s ea  SL, DL stance 5x30s ea SL, DL stance 5x30s ea SL, DL stance 5x30s ea  SL, DL stance 5x30s ea                                                   Ther Ex                        Gastroc/soleus stretch 3x (5x30s) 3x (5x30s) np    3x (5x30s) 3x (5x30s) 3x (5x30s)   Jump downs 6' 4x5 SL 4" 4x5 np dc        Eccentrics 3x15 3x15      3x15 ea 3x15 ea   Stance on BOSU 5x30s 5x30s 5x30s 5x30s 5x30s 5x30s np  5x30s   Trampoline 3x1' 3x1'          Press offs-leg press   3x5 30#      3x5 30#   Jumps    4x5 4x5       Leg press    100# 3x15 100# 3x15 Calf raise-4x5 ea Calf raise-4x5 ea  Calf raise-4x5 ea, 95# 3x15   dynadisc    5x30s        Star exc        3x ea 3x ea                                                                                                               Gait Training Modalities                                    *

## 2021-04-02 ENCOUNTER — TELEPHONE (OUTPATIENT)
Dept: PSYCHIATRY | Facility: CLINIC | Age: 36
End: 2021-04-02

## 2021-04-05 ENCOUNTER — TELEMEDICINE (OUTPATIENT)
Dept: PSYCHIATRY | Facility: CLINIC | Age: 36
End: 2021-04-05
Payer: COMMERCIAL

## 2021-04-05 DIAGNOSIS — F32.1 CURRENT MODERATE EPISODE OF MAJOR DEPRESSIVE DISORDER, UNSPECIFIED WHETHER RECURRENT (HCC): Primary | ICD-10-CM

## 2021-04-05 DIAGNOSIS — F41.9 ANXIETY: ICD-10-CM

## 2021-04-05 DIAGNOSIS — F43.12 CHRONIC POST-TRAUMATIC STRESS DISORDER (PTSD): ICD-10-CM

## 2021-04-05 DIAGNOSIS — R41.840 ATTENTION AND CONCENTRATION DEFICIT: ICD-10-CM

## 2021-04-05 PROCEDURE — 98968 PH1 ASSMT&MGMT NQHP 21-30: CPT | Performed by: PHYSICIAN ASSISTANT

## 2021-04-05 RX ORDER — DESVENLAFAXINE 50 MG/1
50 TABLET, EXTENDED RELEASE ORAL DAILY
Qty: 30 TABLET | Refills: 1 | Status: SHIPPED | OUTPATIENT
Start: 2021-04-05 | End: 2021-05-25 | Stop reason: SDUPTHER

## 2021-04-05 NOTE — PSYCH
This note was not shared with the patient due to patient requested    Virtual Brief Visit    Assessment/Plan:    Problem List Items Addressed This Visit     None      Visit Diagnoses     Current moderate episode of major depressive disorder, unspecified whether recurrent ([address] )    -  Primary    Relevant Medications    desvenlafaxine succinate (PRISTIQ) 50 mg 24 hr tablet    Chronic post-traumatic stress disorder (PTSD)        Relevant Medications    desvenlafaxine succinate (PRISTIQ) 50 mg 24 hr tablet    Anxiety        Relevant Medications    desvenlafaxine succinate (PRISTIQ) 50 mg 24 hr tablet    Attention and concentration deficit        Relevant Medications    desvenlafaxine succinate (PRISTIQ) 50 mg 24 hr tablet                Reason for visit is   Chief Complaint   Patient presents with    Medication Management    Follow-up    Virtual Brief Visit        Encounter provider Dorrene Saint, PA-C    Provider located at [address] [facility] [address]    Recent Visits  No visits were found meeting these conditions  Showing recent visits within past 7 days and meeting all other requirements     Today's Visits  Date Type Provider Dept   04/05/21 Telemedicine Dorrene Saint, PA-C [address] today's visits and meeting all other requirements     Future Appointments  No visits were found meeting these conditions  Showing future appointments within next 150 days and meeting all other requirements        After connecting through telephone, the patient was identified by name and date of birth  Theta [employer] was informed that this is a telemedicine visit and that the visit is being conducted through telephone  My office door was closed  No one else was in the room  She acknowledged consent and understanding of privacy and security of the platform   The patient has agreed to participate and understands she can discontinue the visit at any time  Patient is aware this is a billable service  Subjective    Shayy Guerrero is a 37 y o  female  With history of chronic PTSD, anxiety, major depression  HPI    Radha was seen for follow-up and medication change  She has been taking desvenlafaxine 25 mg for three weeks  Notes that she has been tolerating well without adverse effect  Previously had adverse effect to Trintellix as well as Wellbutrin  Reports she feels as though her mood has slightly improved  as well  Depression is slightly better although continues with difficulty with motivation  Sleeping is improved  And she is working on sleep hygiene  Appetite is adequate  Does note continued difficulty with focus and concentration  Struggles with trying to study and maintaining attention  Has been seeing her therapist regularly and receiving EMDR  For her past trauma  Also continues with difficulties due to her Achilles  She is obtaining 2nd opinion  She had been exercising on a regular basis prior to her injury a urine half ago  This has been difficult for her as well due to her limited mobility  Is continuing physical therapy for this  Past Medical History:   Diagnosis Date    Chronic sinus infection     Depression     Post-op pain        Past Surgical History:   Procedure Laterality Date    ESOPHAGOGASTRODUODENOSCOPY N/A 8/15/2018    Procedure: ESOPHAGOGASTRODUODENOSCOPY (EGD); Surgeon: Clyde Knott MD;  [facility];   Service: Gastroenterology    HAND SURGERY Right     KNEE ARTHROSCOPY Left     lateral meniscus tear, ACL tear    KNEE SURGERY Left     [place] REPAIR ACHILLES TENDON,PRIMARY Right 9/30/2019    Procedure: REPAIR TENDON ACHILLES;  Surgeon: Abdon Glass MD;  Location: [facility];  Service: Orthopedics    UPPER GASTROINTESTINAL ENDOSCOPY         Current Outpatient Medications   Medication Sig Dispense Refill    desvenlafaxine succinate (PRISTIQ) 50 mg 24 hr tablet Take 1 tablet (50 mg total) by mouth daily 30 tablet 1    fexofenadine (ALLEGRA) 180 MG tablet Take 1 tablet (180 mg total) by mouth daily  0    ibuprofen (MOTRIN) 800 mg tablet Take by mouth every 6 (six) hours as needed for mild pain      levonorgestrel (MIRENA) 20 MCG/24HR IUD 1 Intra Uterine Device by Intrauterine route once for 1 dose (Patient taking differently: 1 each by Intrauterine route once ) 1 each 0    mometasone (NASONEX) 50 mcg/act nasal spray 2 sprays into each nostril daily 17 g 5     No current facility-administered medications for this visit  No Known Allergies    Review of Systems   As noted in HPI      Mental status exam:   Speech is clear and coherent, normal rate and volume   Mood is slightly less depressed,  Continues with anxiety  Linear and coherent thought process   No suicidal or homicidal ideation   No psychotic signs or symptoms, no hallucinations or delusions   Reports concentration is diminished   Insight and judgment is fair   Cognition is fair     Medications as follows: Will increase test venlafaxine from 25 mg to 50 mg daily   Follow-up in one month and she will call sooner if any questions or concerns   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 improvement in depressive disorder /mood disorder  Continue to follow-up with her therapist as schedule  Also Will continue with after care for her Achilles rupture       There were no vitals filed for this visit  I spent 25 minutes directly with the patient during this visit    [address] acknowledges that she has consented to an online visit or consultation  She understands that the online visit is based solely on information provided by her, and that, in the absence of a face-to-face physical evaluation by the physician, the diagnosis she receives is both limited and provisional in terms of accuracy and completeness   This is not intended to replace a full medical face-to-face evaluation by the physician  Samantha Davenport understands and accepts these terms

## 2021-04-14 ENCOUNTER — APPOINTMENT (OUTPATIENT)
Dept: PHYSICAL THERAPY | Age: 36
End: 2021-04-14
Payer: COMMERCIAL

## 2021-04-15 ENCOUNTER — TELEPHONE (OUTPATIENT)
Dept: OBGYN CLINIC | Facility: HOSPITAL | Age: 36
End: 2021-04-15

## 2021-04-15 NOTE — TELEPHONE ENCOUNTER
Patient sees Jessica Muñoz from [facility] called, she was looking for patients last office visit or telemed visit with the

## 2021-04-16 ENCOUNTER — APPOINTMENT (OUTPATIENT)
Dept: PHYSICAL THERAPY | Age: 36
End: 2021-04-16
Payer: COMMERCIAL

## 2021-04-21 ENCOUNTER — OFFICE VISIT (OUTPATIENT)
Dept: PHYSICAL THERAPY | Age: 36
End: 2021-04-21
Payer: COMMERCIAL

## 2021-04-21 DIAGNOSIS — S86.001A ACHILLES TENDON INJURY, RIGHT, INITIAL ENCOUNTER: Primary | ICD-10-CM

## 2021-04-21 PROCEDURE — 97110 THERAPEUTIC EXERCISES: CPT | Performed by: PHYSICAL THERAPIST

## 2021-04-21 PROCEDURE — 97164 PT RE-EVAL EST PLAN CARE: CPT | Performed by: PHYSICAL THERAPIST

## 2021-04-21 PROCEDURE — 97140 MANUAL THERAPY 1/> REGIONS: CPT | Performed by: PHYSICAL THERAPIST

## 2021-04-21 NOTE — PROGRESS NOTES
Daily Note     Today's date: 2021  Patient name: Yola Liang  : 1985  MRN: [id number]  Referring provider: Sarah Shanks MD  Dx:   Encounter Diagnosis     ICD-10-CM    1  Achilles tendon injury, right, initial encounter  S86 001A                   Subjective: Pt reports overall decrease in pain with exercise since last visit    Objective: See treatment diary below      Assessment: Tolerated treatment well  Patient demonstrated fatigue post treatment, would benefit from continued PT and pt will benefit from increased visits for next 8 weeks as activity tolerance has increased  Plan: Continue per plan of care  Progress treatment as tolerated         Precautions: R achilles repair     Manuals 4/21  12/9 12/16 1/6 2/5 3/3 3/17 3/31   Calf STM TERRELL       TERRELL TERRELL   AnkleROM          TERRELL   FOTO SCORE INTIAL: 38         47 (+16)               Neuro Re-Ed            Dynadisc  SL, DL stance 5x30s ea SL, DL stance 5x30s ea  SL, DL stance 5x30s ea SL, DL stance 5x30s ea SL, DL stance 5x30s ea  SL, DL stance 5x30s ea                                                   Ther Ex                        Gastroc/soleus stretch  3x (5x30s) np    3x (5x30s) 3x (5x30s) 3x (5x30s)   Jump downs  SL 4" 4x5 np dc        Eccentrics 3x15 3x15      3x15 ea 3x15 ea   Stance on BOSU  5x30s 5x30s 5x30s 5x30s 5x30s np  5x30s   Trampoline  3x1'          Press offs-leg press   3x5 30#      3x5 30#   Jumps    4x5 4x5       Leg press Push off 50# 4x5    100# 3x15 100# 3x15 Calf raise-4x5 ea Calf raise-4x5 ea  Calf raise-4x5 ea, 95# 3x15   dynadisc    5x30s        Star exc        3x ea 3x ea   Jumps DL 2x5, SL 1x5           Sled push-on toes 4x 50#                                                                                               Gait Training                                    Modalities                                    *

## 2021-04-26 ENCOUNTER — OFFICE VISIT (OUTPATIENT)
Dept: PHYSICAL THERAPY | Age: 36
End: 2021-04-26
Payer: COMMERCIAL

## 2021-04-26 DIAGNOSIS — S86.001A ACHILLES TENDON INJURY, RIGHT, INITIAL ENCOUNTER: Primary | ICD-10-CM

## 2021-04-26 PROCEDURE — 97140 MANUAL THERAPY 1/> REGIONS: CPT | Performed by: PHYSICAL THERAPIST

## 2021-04-26 PROCEDURE — 97110 THERAPEUTIC EXERCISES: CPT | Performed by: PHYSICAL THERAPIST

## 2021-04-26 NOTE — PROGRESS NOTES
Daily Note     Today's date: 2021  Patient name: Marlene Brown  : 1985  MRN: [id number]  Referring provider: Margy Burton MD  Dx:   Encounter Diagnosis     ICD-10-CM    1  Achilles tendon injury, right, initial encounter  S86 001A                   Subjective: Pt reports minimal changes      Objective: See treatment diary below      Assessment: Tolerated treatment well  Patient demonstrated fatigue post treatment and would benefit from continued PT      Plan: Continue per plan of care  Progress treatment as tolerated         Precautions: R achilles repair     Manuals            Calf STM TERRELL TERRELL          AnkleROM             FOTO SCORE INTIAL: 45                        Neuro Re-Ed            Dynadisc                                                            Ther Ex                        Gastroc/soleus stretch            Jump downs            Eccentrics 3x15           Stance on BOSU            Trampoline            Press offs-leg press            Jumps            Leg press Push off 50# 4x5  Push off 50# 4x5           dynadisc            Star exc            Jumps DL 2x5, SL 1x5 DL 4x5, SL side to side 4x5          Sled push-on toes 4x 50#                                                                                               Gait Training                                    Modalities                                    *

## 2021-04-30 ENCOUNTER — OFFICE VISIT (OUTPATIENT)
Dept: PHYSICAL THERAPY | Age: 36
End: 2021-04-30
Payer: COMMERCIAL

## 2021-04-30 DIAGNOSIS — S86.001A ACHILLES TENDON INJURY, RIGHT, INITIAL ENCOUNTER: Primary | ICD-10-CM

## 2021-04-30 PROCEDURE — 97110 THERAPEUTIC EXERCISES: CPT | Performed by: PHYSICAL THERAPIST

## 2021-04-30 PROCEDURE — 97140 MANUAL THERAPY 1/> REGIONS: CPT | Performed by: PHYSICAL THERAPIST

## 2021-04-30 NOTE — PROGRESS NOTES
Daily Note     Today's date: 2021  Patient name: Juan Byrd  : 1985  MRN: [id number]  Referring provider: Rosanne Nash MD  Dx:   Encounter Diagnosis     ICD-10-CM    1  Achilles tendon injury, right, initial encounter  S86 001A                   Subjective: Pt reports minimal pain after last session      Objective: See treatment diary below      Assessment: Tolerated treatment well  Patient demonstrated fatigue post treatment, would benefit from continued PT and pt remains challeneged with plyometric exercises      Plan: Continue per plan of care  Progress treatment as tolerated         Precautions: R achilles repair     Manuals           Calf STM TERRELL TERRELL          AnkleROM             FOTO SCORE INTIAL: 45                        Neuro Re-Ed            Dynadisc                                                            Ther Ex                        Gastroc/soleus stretch            Jump downs            Eccentrics 3x15           Stance on BOSU            Trampoline            Press offs-leg press            Jumps            Leg press Push off 50# 4x5  Push off 50# 4x5           dynadisc            Star exc            Jumps DL 2x5, SL 1x5 DL 4x5, SL side to side 4x5          Sled push-on toes 4x 50#                                                                                               Gait Training                                    Modalities                                    *

## 2021-05-25 ENCOUNTER — TELEMEDICINE (OUTPATIENT)
Dept: PSYCHIATRY | Facility: CLINIC | Age: 36
End: 2021-05-25
Payer: COMMERCIAL

## 2021-05-25 DIAGNOSIS — F41.9 ANXIETY: ICD-10-CM

## 2021-05-25 DIAGNOSIS — F32.1 CURRENT MODERATE EPISODE OF MAJOR DEPRESSIVE DISORDER, UNSPECIFIED WHETHER RECURRENT (HCC): Primary | ICD-10-CM

## 2021-05-25 DIAGNOSIS — F43.12 CHRONIC POST-TRAUMATIC STRESS DISORDER (PTSD): ICD-10-CM

## 2021-05-25 DIAGNOSIS — R41.840 ATTENTION AND CONCENTRATION DEFICIT: ICD-10-CM

## 2021-05-25 PROCEDURE — 99443 PR PHYS/QHP TELEPHONE EVALUATION 21-30 MIN: CPT | Performed by: PHYSICIAN ASSISTANT

## 2021-05-25 RX ORDER — DESVENLAFAXINE 50 MG/1
50 TABLET, EXTENDED RELEASE ORAL DAILY
Qty: 90 TABLET | Refills: 1 | Status: SHIPPED | OUTPATIENT
Start: 2021-05-25 | End: 2021-06-25 | Stop reason: SDUPTHER

## 2021-05-25 NOTE — PSYCH
This note was not shared with the patient due to patient requested  Virtual Brief Visit    Assessment/Plan:    Problem List Items Addressed This Visit     None      Visit Diagnoses     Current moderate episode of major depressive disorder, unspecified whether recurrent ([address] )    -  Primary    Relevant Medications    desvenlafaxine succinate (PRISTIQ) 50 mg 24 hr tablet    Chronic post-traumatic stress disorder (PTSD)        Relevant Medications    desvenlafaxine succinate (PRISTIQ) 50 mg 24 hr tablet    Anxiety        Relevant Medications    desvenlafaxine succinate (PRISTIQ) 50 mg 24 hr tablet    Attention and concentration deficit        Relevant Medications    desvenlafaxine succinate (PRISTIQ) 50 mg 24 hr tablet                Reason for visit is   Chief Complaint   Patient presents with    Medication Management    Follow-up    Virtual Brief Visit        Encounter provider Ary Cockayne, PA-C    Provider located at [address] [address]    Recent Visits  No visits were found meeting these conditions  Showing recent visits within past 7 days and meeting all other requirements     Today's Visits  Date Type Provider Dept   05/25/21 Telemedicine [facility]-C [facility] today's visits and meeting all other requirements     Future Appointments  No visits were found meeting these conditions  Showing future appointments within next 150 days and meeting all other requirements        After connecting through telephone, the patient was identified by name and date of birth  Nallely Slider was informed that this is a telemedicine visit and that the visit is being conducted through [email or web address] and patient was informed that this is not a secure, HIPAA-compliant platform  She agrees to proceed     My office door was closed  No one else was in the room    She acknowledged consent and understanding of privacy and security of the platform  The patient has agreed to participate and understands she can discontinue the visit at any time  Patient is aware this is a billable service  Subjective    Darien Peterson is a 37 y o  female   With history of major depression, anxiety and chronic PTSD  HPI    Radha was seen for follow-up of major depression, chronic PTSD,  anxiety and medication check  At her last appointment in April her Pristiq was increased from 25 mg to 50 mg daily  She has gradually noted improvement with her mood over the past few weeks  Notes that her depression has been improving  Anxiety has been decreasing  Her sleep pattern has also been gradually improving  Notes that her appetite is slightly decreased but she is eating throughout the day  No suicidal or homicidal ideation  She continues to work with her therapist and receiving EMDR for her past traumas  Notes that her " foggy" feeling is also gradually improving  Her concentration and motivation have been better but she is still working on adhering to schedule  she will try to implement of schedule/ routine to help her with back to work plan for July 1st   Discussed concerns for return to work and taking exams due to continued anxiety symptoms  Discussed with patient possibility for accommodations such as additional length of time to take exams and quiet space due to her anxiety disorder  she will notify me if forms need to be completed for specific accommodations moving forward  She has been compliant in taking medications as prescribed  Also continues in physical therapy for her Achilles injury  States that she continues with pain but this has also been lessening  No other new medical issues or medications noted      Past Medical History:   Diagnosis Date    Chronic sinus infection     Depression     Post-op pain        Past Surgical History:   Procedure Laterality Date    ESOPHAGOGASTRODUODENOSCOPY N/A 8/15/2018    Procedure: ESOPHAGOGASTRODUODENOSCOPY (EGD); Surgeon: Herrera Perez MD;  [facility]; Service: Gastroenterology    HAND SURGERY Right     KNEE ARTHROSCOPY Left     lateral meniscus tear, ACL tear    KNEE SURGERY Left     [place] REPAIR ACHILLES TENDON,PRIMARY Right 9/30/2019    Procedure: REPAIR TENDON ACHILLES;  Surgeon: Ashleigh Peter MD;  Location[facility];  Service: Orthopedics    UPPER GASTROINTESTINAL ENDOSCOPY         Current Outpatient Medications   Medication Sig Dispense Refill    desvenlafaxine succinate (PRISTIQ) 50 mg 24 hr tablet Take 1 tablet (50 mg total) by mouth daily 90 tablet 1    fexofenadine (ALLEGRA) 180 MG tablet Take 1 tablet (180 mg total) by mouth daily  0    ibuprofen (MOTRIN) 800 mg tablet Take by mouth every 6 (six) hours as needed for mild pain      levonorgestrel (MIRENA) 20 MCG/24HR IUD 1 Intra Uterine Device by Intrauterine route once for 1 dose (Patient taking differently: 1 each by Intrauterine route once ) 1 each 0    mometasone (NASONEX) 50 mcg/act nasal spray 2 sprays into each nostril daily 17 g 5     No current facility-administered medications for this visit  No Known Allergies    Review of Systems     As noted in HPI     Mental status exam:   Speech is clear and coherent, normal rate and volume  Mood is less depressed, less anxious  Linear and coherent thought process   No suicidal or homicidal ideation   No psychotic signs symptoms, no hallucinations or delusions    concentration improving   Cognition is intact  Insight and judgment is intact    Medications treatment plan as follows:  Continue Pristiq 50 mg daily, patient is tolerating well and positive response noted   Will follow-up in one month to re-evaluate  Plan to return to work July 1st  Continue  Follow-up with her therapist as scheduled    There were no vitals filed for this visit        I spent 25 minutes directly with the patient during this visit    nVIRTUAL VISIT DISCLAIMER    Kayleen Officer acknowledges that she has consented to an online visit or consultation  She understands that the online visit is based solely on information provided by her, and that, in the absence of a face-to-face physical evaluation by the physician, the diagnosis she receives is both limited and provisional in terms of accuracy and completeness  This is not intended to replace a full medical face-to-face evaluation by the physician  Kayleen Officer understands and accepts these terms

## 2021-06-04 ENCOUNTER — TELEPHONE (OUTPATIENT)
Dept: OBGYN CLINIC | Facility: HOSPITAL | Age: 36
End: 2021-06-04

## 2021-06-04 NOTE — TELEPHONE ENCOUNTER
Cassi Gr is calling from [employer] to make sure we received the forms faxed on 06-02-21  Advised they have been scanned into the chart  Cassi Gr advised that these forms need to be completed and returned by 06-17-21

## 2021-06-15 NOTE — TELEPHONE ENCOUNTER
We did not agree with their assessment and will not  Fill out their paperwork which states that we do agree  If there is further issue we will plan to see her back in the beginning of July for re-evaluation prior to releasing her

## 2021-06-23 ENCOUNTER — TELEPHONE (OUTPATIENT)
Dept: PSYCHIATRY | Facility: CLINIC | Age: 36
End: 2021-06-23

## 2021-06-23 ENCOUNTER — TELEPHONE (OUTPATIENT)
Dept: OBGYN CLINIC | Facility: HOSPITAL | Age: 36
End: 2021-06-23

## 2021-06-23 NOTE — TELEPHONE ENCOUNTER
Patient called in regards to disability paperwork  She would like to speak with Candi Davila or Deri Goldberg if possible  She can be reached at [phone]   Thank you

## 2021-06-23 NOTE — TELEPHONE ENCOUNTER
Did leave message on the patient's voicemail that I would give her a call tomorrow morning to discuss

## 2021-06-23 NOTE — TELEPHONE ENCOUNTER
Christal Andres called she needs a return back to work letter   She will be going back to work July 1st      Please fax to [phone]  Attn: Rashawn Bell pt arrived to ed c/o abd pain x 2 days. pt rates pain 8/10, and describes as 
sharp and tightness.  bowel sounds present in all quads, tenderness on right 
upper and lower quad that radiates to right flank. pt is a &o x 4, pt is shaky 
and jittery. pt states, "im in the middle of detoxing and this is my day 2 and 
i feel jittery and in pain." pt states last drink was 2-3days ago. vs stable. 
no resp distress. pt denies dysuria. pt also states he vomitted this morning.



pmh: alcohol abuse



nka.

## 2021-06-24 NOTE — TELEPHONE ENCOUNTER
Please put the patient on schedule next Wednesday At [facility]    For final follow-up of the Achilles so we can review disability paperwork and clear her for July 1st

## 2021-06-25 ENCOUNTER — TELEMEDICINE (OUTPATIENT)
Dept: PSYCHIATRY | Facility: CLINIC | Age: 36
End: 2021-06-25
Payer: COMMERCIAL

## 2021-06-25 DIAGNOSIS — F43.12 CHRONIC POST-TRAUMATIC STRESS DISORDER (PTSD): ICD-10-CM

## 2021-06-25 DIAGNOSIS — F32.1 CURRENT MODERATE EPISODE OF MAJOR DEPRESSIVE DISORDER, UNSPECIFIED WHETHER RECURRENT (HCC): Primary | ICD-10-CM

## 2021-06-25 DIAGNOSIS — R41.840 ATTENTION AND CONCENTRATION DEFICIT: ICD-10-CM

## 2021-06-25 DIAGNOSIS — F51.01 PRIMARY INSOMNIA: ICD-10-CM

## 2021-06-25 DIAGNOSIS — F41.9 ANXIETY: ICD-10-CM

## 2021-06-25 PROCEDURE — 99443 PR PHYS/QHP TELEPHONE EVALUATION 21-30 MIN: CPT | Performed by: PHYSICIAN ASSISTANT

## 2021-06-25 RX ORDER — TRAZODONE HYDROCHLORIDE 50 MG/1
50 TABLET ORAL
Qty: 30 TABLET | Refills: 1 | Status: SHIPPED | OUTPATIENT
Start: 2021-06-25 | End: 2021-07-23 | Stop reason: SDUPTHER

## 2021-06-25 RX ORDER — DESVENLAFAXINE 50 MG/1
50 TABLET, EXTENDED RELEASE ORAL DAILY
Qty: 90 TABLET | Refills: 1 | Status: SHIPPED | OUTPATIENT
Start: 2021-06-25 | End: 2021-09-03 | Stop reason: SDUPTHER

## 2021-06-25 NOTE — PSYCH
Virtual Brief Visit    Assessment/Plan:    Problem List Items Addressed This Visit     None      Visit Diagnoses     Current moderate episode of major depressive disorder, unspecified whether recurrent ([facility] [address] )    -  Primary    Relevant Medications    traZODone (DESYREL) 50 mg tablet    desvenlafaxine succinate (PRISTIQ) 50 mg 24 hr tablet    Chronic post-traumatic stress disorder (PTSD)        Relevant Medications    traZODone (DESYREL) 50 mg tablet    desvenlafaxine succinate (PRISTIQ) 50 mg 24 hr tablet    Anxiety        Relevant Medications    desvenlafaxine succinate (PRISTIQ) 50 mg 24 hr tablet    Attention and concentration deficit        Relevant Medications    desvenlafaxine succinate (PRISTIQ) 50 mg 24 hr tablet    Primary insomnia        Relevant Medications    traZODone (DESYREL) 50 mg tablet                Reason for visit is   Chief Complaint   Patient presents with    Medication Management    Follow-up    Virtual Brief Visit        Encounter provider Ifrah Sahu PA-C    Provider located at [address] [id number]    Recent Visits  Date Type Provider Dept   06/23/21 Telephone SHA Fatima recent visits within past 7 days and meeting all other requirements  Today's Visits  Date Type Provider Dept   06/25/21 [address] [place] [address], SHA Lo today's visits and meeting all other requirements  Future Appointments  No visits were found meeting these conditions  Showing future appointments within next 150 days and meeting all other requirements       After connecting through telephone, the patient was identified by name and date of birth  Emy Body was informed that this is a telemedicine visit and that the visit is being conducted through telephone  My office door was closed  No one else was in the room    She acknowledged consent and understanding of privacy and security of the platform  The patient has agreed to participate and understands she can discontinue the visit at any time  Patient is aware this is a billable service  Subjective    Garrett Aquino is a 37 y o  female   With history of depression, chronic PTSD, anxiety  HPI    Radha was seen for follow-up of major depression, chronic PTSD, anxiety and medication check  Overall her depression and anxiety has been gradually improving over the past few months  Continues to work with her therapist on a regular basis which has been helpful and also utilizing EMDR for past trauma  Started studying and unfortunately had panic attacks with this  States that her heart starts pounding, she feels on edge in very fearful/overwhelmed without a reason  Working with her therapist on deep breathing techniques and other coping skills which has been helpful  Reports that overall since starting with the Pristiq and seeing her therapist her focus and concentration has improved  does note some continued difficulties though with reading comprehension  No adverse effects noted with Pristiq  Tolerating well  Her sleep schedule has been variable which we discussed  With returning to work she will be starting around 06:00  Review sleep hygiene  Will also add trazodone as needed to help with sleep pattern  Continues to report that her appetite has been slightly diminished but she has been maintaining about the same weight  She has been recuperating gradually as well with her Achilles injury  She will be seeing orthopedics in the next couple days to return her to work from a physical aspect as well  No new medications or other medical issues were noted  Medication list is reviewed and updated  Reports that she feels ready to resume work as previously discussed July 1st     Does have residual anxiety with plans to return      We discussed this and readjustment time  Past Medical History:   Diagnosis Date    Chronic sinus infection     Depression     Post-op pain        Past Surgical History:   Procedure Laterality Date    ESOPHAGOGASTRODUODENOSCOPY N/A 8/15/2018    Procedure: ESOPHAGOGASTRODUODENOSCOPY (EGD); Surgeon: Josselin Gracia MD;  Location:  GI LAB; Service: Gastroenterology    HAND SURGERY Right     KNEE ARTHROSCOPY Left     lateral meniscus tear, ACL tear    KNEE SURGERY Left     [place] REPAIR ACHILLES TENDON,PRIMARY Right 9/30/2019    Procedure: REPAIR TENDON ACHILLES;  Surgeon: Vicki Aguilar MD;  Location: [facility];  Service: Orthopedics    UPPER GASTROINTESTINAL ENDOSCOPY         Current Outpatient Medications   Medication Sig Dispense Refill    desvenlafaxine succinate (PRISTIQ) 50 mg 24 hr tablet Take 1 tablet (50 mg total) by mouth daily 90 tablet 1    fexofenadine (ALLEGRA) 180 MG tablet Take 1 tablet (180 mg total) by mouth daily  0    ibuprofen (MOTRIN) 800 mg tablet Take by mouth every 6 (six) hours as needed for mild pain      levonorgestrel (MIRENA) 20 MCG/24HR IUD 1 Intra Uterine Device by Intrauterine route once for 1 dose (Patient taking differently: 1 each by Intrauterine route once ) 1 each 0    mometasone (NASONEX) 50 mcg/act nasal spray 2 sprays into each nostril daily 17 g 5    traZODone (DESYREL) 50 mg tablet Take 1 tablet (50 mg total) by mouth daily at bedtime as needed for sleep 30 tablet 1     No current facility-administered medications for this visit          No Known Allergies    Review of Systems     As noted in HPI     Mental status exam:   Speech is clear and coherent, normal rate and volume   Mood is less depressed, less anxious   Linear and coherent thought process   No suicidal or homicidal ideation   No psychotic signs or symptoms, no hallucinations or delusions were voiced    concentration diminished at times  Cognition is intact  Insight and judgment is intact     Medications and treatment plan as follows:   add trazodone 50 mg at bedtime as needed for insomnia  Pristiq 50 mg daily  Plan to return to work as scheduled for July 1st   Letter will be sent clearing her to return   Will continue with follow-up with her therapist as scheduled  Follow-up with me in 4 weeks and she will call sooner if any questions or concerns    There were no vitals filed for this visit  I spent 30 minutes directly with the patient during this visit    [address] acknowledges that she has consented to an online visit or consultation  She understands that the online visit is based solely on information provided by her, and that, in the absence of a face-to-face physical evaluation by the physician, the diagnosis she receives is both limited and provisional in terms of accuracy and completeness  This is not intended to replace a full medical face-to-face evaluation by the physician  Marlene Brown understands and accepts these terms

## 2021-06-30 ENCOUNTER — OFFICE VISIT (OUTPATIENT)
Dept: PHYSICAL THERAPY | Age: 36
End: 2021-06-30
Payer: COMMERCIAL

## 2021-06-30 ENCOUNTER — OFFICE VISIT (OUTPATIENT)
Dept: OBGYN CLINIC | Facility: OTHER | Age: 36
End: 2021-06-30
Payer: COMMERCIAL

## 2021-06-30 VITALS
WEIGHT: 239 LBS | HEART RATE: 83 BPM | SYSTOLIC BLOOD PRESSURE: 130 MMHG | HEIGHT: 68 IN | BODY MASS INDEX: 36.22 KG/M2 | DIASTOLIC BLOOD PRESSURE: 89 MMHG

## 2021-06-30 DIAGNOSIS — M54.12 CERVICAL RADICULOPATHY: ICD-10-CM

## 2021-06-30 DIAGNOSIS — S86.001A ACHILLES TENDON INJURY, RIGHT, INITIAL ENCOUNTER: Primary | ICD-10-CM

## 2021-06-30 DIAGNOSIS — Z98.890 S/P ACHILLES TENDON REPAIR: ICD-10-CM

## 2021-06-30 DIAGNOSIS — Z98.890 HISTORY OF ACHILLES TENDON REPAIR: Primary | ICD-10-CM

## 2021-06-30 PROCEDURE — 97140 MANUAL THERAPY 1/> REGIONS: CPT | Performed by: PHYSICAL THERAPIST

## 2021-06-30 PROCEDURE — 97110 THERAPEUTIC EXERCISES: CPT | Performed by: PHYSICAL THERAPIST

## 2021-06-30 PROCEDURE — 99213 OFFICE O/P EST LOW 20 MIN: CPT | Performed by: ORTHOPAEDIC SURGERY

## 2021-06-30 NOTE — PROGRESS NOTES
Daily Note     Today's date: 2021  Patient name: Enmanuel Bo  : 1985  MRN: [id number]  Referring provider: Esteban Oropeza MD  Dx:   Encounter Diagnosis     ICD-10-CM    1  History of Achilles tendon repair  Z98 890    2  Cervical radiculopathy  M54 12                   Subjective: Pt reports less pain with ambulation/ALS      Objective: See treatment diary below      Assessment: Tolerated treatment well  Patient demonstrated fatigue post treatment, would benefit from continued PT and pt remains limited in LE force production, was advised on strengthening/plyometrics for return to run program      Plan: Continue per plan of care  Progress treatment as tolerated         Precautions: R achilles repair     Manuals          Calf STM TERRELL TERRELL          AnkleROM             FOTO SCORE INTIAL: 45            PF STM   TERRELL         Neuro Re-Ed            Dynadisc                                                            Ther Ex                        Gastroc/soleus stretch            Jump downs            Eccentrics 3x15           Stance on BOSU            Trampoline            Press offs-leg press            Jumps   4x5         Leg press Push off 50# 4x5  Push off 50# 4x5           dynadisc            Star exc            Jumps DL 2x5, SL 1x5 DL 4x5, SL side to side 4x5          Sled push-on toes 4x 50#           Heel raise   2x15                                                                                 Gait Training                                    Modalities                                    *

## 2021-06-30 NOTE — PROGRESS NOTES
Orthopaedic Surgery - Office Note  Taryn Gallardo (28 y o  female)   : 1985   MRN: [id number]  Encounter Date: 2021    Chief Complaint   Patient presents with    Right Achilles Tendon - Follow-up       Assessment / Plan  S/p Achilles tendon repair 19    · Activity as tolerated  · Home exercise program reviewed  · Anti-inflammatories or Tylenol prn pain  · The last time the patient was in the office in 2020, at that appointment she was not ready to return to work  Since her last appointment she has switched physical therapist and has been focusing on physical therapy  At this point she is able to return to work without restrictions on 2021  Return if symptoms worsen or fail to improve  History of Present Illness  Taryn Gallardo is a 37 y o  female who presents for follow up evaluation of right achilles tendon repair on 2019  She has taken a year off from work with medical leave  She was last seen by me in 2020, at which point she was not ready to return to work due to persistent ankle pain and stiffnes  In the last year she did go for a second opinion and has switched physical therapy sites  She has been compliant with formal physical therapy since her surgery  Her last physical therapist have been focusing on manual therapy to decrease scar tissue  She states overall she is feeling much better and is having minimal pain  At this time she is ready to return to work  She denies any further injury or trauma to her right ankle  She denies any distal paresthesias  Review of Systems  Pertinent items are noted in HPI  All other systems were reviewed and are negative  Physical Exam  /89   Pulse 83   Ht 5' 8 25" (1 734 m)   Wt 108 kg (239 lb)   BMI 36 07 kg/m²   Cons: Appears well  No apparent distress  Psych: Alert  Oriented x3  Mood and affect normal   Eyes: PERRLA, EOMI  Resp: Normal effort  No audible wheezing or stridor  CV: Palpable pulse  No discernable arrhythmia  No LE edema  Lymph:  No palpable cervical, axillary, or inguinal lymphadenopathy  Skin: Warm  No palpable masses  No visible lesions  Neuro: Normal muscle tone  Normal and symmetric DTR's  Sl heel raise   No tenderness       Right Lower Extremity Exam  Alignment:  Normal knee alignment  Inspection:  No swelling  No edema  No erythema  No ecchymosis  No muscle atrophy  Incision healed  Palpation:  No tenderness  No effusion  No warmth  ROM:  Ankle DF 10°, equal bilateral  Ankle PF 40°  Strength:  Able to perform single-leg heel rise  Stability:  No objective LE joint instablity  Neurovascular:  Sensation intact in DP/SP/Crews/Sa/T nerve distributions  2+ DP & PT pulses  Gait:  Normal     Studies Reviewed  No studies to review    Procedures  No procedures today  Medical, Surgical, Family, and Social History  The patient's medical history, family history, and social history, were reviewed and updated as appropriate  Past Medical History:   Diagnosis Date    Chronic sinus infection     Depression     Post-op pain        Past Surgical History:   Procedure Laterality Date    ESOPHAGOGASTRODUODENOSCOPY N/A 8/15/2018    Procedure: ESOPHAGOGASTRODUODENOSCOPY (EGD); Surgeon: Vielka Yi MD;  [facility];   Service: Gastroenterology    HAND SURGERY Right     KNEE ARTHROSCOPY Left     lateral meniscus tear, ACL tear    KNEE SURGERY Left     [place] REPAIR ACHILLES TENDON,PRIMARY Right 9/30/2019    Procedure: REPAIR TENDON ACHILLES;  Surgeon: Tremaine Sullivan MD;  Location: [facility] OR;  Service: Orthopedics    UPPER GASTROINTESTINAL ENDOSCOPY         Family History   Problem Relation Age of Onset    Depression Mother     Hypertension Mother     Diabetes Father     Hypertension Father     Cancer Father     Heart failure Father     Leukemia Father     No Known Problems Sister     No Known Problems Brother     Diabetes Paternal Grandmother     Hypertension Paternal Grandmother     Alcohol abuse Maternal Uncle     Depression Maternal Uncle     Mental illness Maternal Uncle     Alcohol abuse Cousin     Depression Cousin     Mental illness Cousin        Social History     Occupational History    Not on file   Tobacco Use    Smoking status: Never Smoker    Smokeless tobacco: Never Used   Vaping Use    Vaping Use: Never used   Substance and Sexual Activity    Alcohol use: Yes     Comment: social    Drug use: No    Sexual activity: Not Currently     Partners: Male       No Known Allergies      Current Outpatient Medications:     desvenlafaxine succinate (PRISTIQ) 50 mg 24 hr tablet, Take 1 tablet (50 mg total) by mouth daily, Disp: 90 tablet, Rfl: 1    fexofenadine (ALLEGRA) 180 MG tablet, Take 1 tablet (180 mg total) by mouth daily, Disp: , Rfl: 0    ibuprofen (MOTRIN) 800 mg tablet, Take by mouth every 6 (six) hours as needed for mild pain, Disp: , Rfl:     mometasone (NASONEX) 50 mcg/act nasal spray, 2 sprays into each nostril daily, Disp: 17 g, Rfl: 5    traZODone (DESYREL) 50 mg tablet, Take 1 tablet (50 mg total) by mouth daily at bedtime as needed for sleep, Disp: 30 tablet, Rfl: 1    levonorgestrel (MIRENA) 20 MCG/24HR IUD, 1 Intra Uterine Device by Intrauterine route once for 1 dose (Patient taking differently: 1 each by Intrauterine route once ), Disp: 1 each, Rfl: 0      Heidi Palangio    Scribe Attestation    I,:  Anisa Hall am acting as a scribe while in the presence of the attending physician :       I,:  Osito Trejo MD personally performed the services described in this documentation    as scribed in my presence :

## 2021-07-23 ENCOUNTER — TELEMEDICINE (OUTPATIENT)
Dept: PSYCHIATRY | Facility: CLINIC | Age: 36
End: 2021-07-23
Payer: COMMERCIAL

## 2021-07-23 DIAGNOSIS — F32.1 CURRENT MODERATE EPISODE OF MAJOR DEPRESSIVE DISORDER, UNSPECIFIED WHETHER RECURRENT (HCC): ICD-10-CM

## 2021-07-23 DIAGNOSIS — F43.12 CHRONIC POST-TRAUMATIC STRESS DISORDER (PTSD): Primary | ICD-10-CM

## 2021-07-23 DIAGNOSIS — F51.01 PRIMARY INSOMNIA: ICD-10-CM

## 2021-07-23 DIAGNOSIS — R41.840 ATTENTION AND CONCENTRATION DEFICIT: ICD-10-CM

## 2021-07-23 DIAGNOSIS — F41.9 ANXIETY: ICD-10-CM

## 2021-07-23 PROCEDURE — 99214 OFFICE O/P EST MOD 30 MIN: CPT | Performed by: PHYSICIAN ASSISTANT

## 2021-07-23 RX ORDER — TRAZODONE HYDROCHLORIDE 50 MG/1
50 TABLET ORAL
Qty: 90 TABLET | Refills: 1 | Status: SHIPPED | OUTPATIENT
Start: 2021-07-23 | End: 2022-01-07 | Stop reason: SDUPTHER

## 2021-07-23 NOTE — PSYCH
This note was not shared with the patient due to patient requested note    Virtual Regular Visit    Verification of patient location:    Patient is currently located in the state of [place]  Patient is currently located in a state in which I am licensed      Assessment/Plan:    Problem List Items Addressed This Visit     None      Visit Diagnoses     Chronic post-traumatic stress disorder (PTSD)    -  Primary    Relevant Medications    traZODone (DESYREL) 50 mg tablet    Anxiety        Current moderate episode of major depressive disorder, unspecified whether recurrent (HCC)        Relevant Medications    traZODone (DESYREL) 50 mg tablet    Attention and concentration deficit        Primary insomnia        Relevant Medications    traZODone (DESYREL) 50 mg tablet          Goals addressed in session: Goal 1          Reason for visit is   Chief Complaint   Patient presents with    Medication Management    Follow-up    Virtual Regular Visit        Encounter provider Westley Toledo PA-C    Provider located at [address] [address]      Recent Visits  No visits were found meeting these conditions  Showing recent visits within past 7 days and meeting all other requirements  Today's Visits  Date Type Provider Dept   [facility]'s visits and meeting all other requirements  Future Appointments  No visits were found meeting these conditions  Showing future appointments within next 150 days and meeting all other requirements       The patient was identified by name and date of birth  Samson Martínez was informed that this is a telemedicine visit and that the [facility] and patient was informed that this is a secure, HIPAA-compliant platform  She agrees to proceed     My office door was closed  No one else was in the room    She acknowledged consent and understanding of privacy and security of the video platform  The patient has agreed to participate and understands they can discontinue the visit at any time  Patient is aware this is a billable service  Subjective  Ilene Barthel is a 37 y o  female with hx of MDD, chronic PTSD   HPI   Radha was seen for follow up of depression, anxiety and medication management  Reports that overall her moods have been more manageable with current medications and treatment regimen  She is taking the trazodone at night and has been getting adequate rest with that  Typically sleeping about 7 hours  States that without the trazodone she is not sleeping  Continues with residual anxiety and difficulties with focus at times but does note an overall improvement  With current treatment plan  Is readjusting to being back at work and has been back for about three weeks  States that she feels physically tired due to being on her feet long hours  Physically reports that she has been feeling better as far as her Achilles tendon repair  Has not been able to follow-up with  Her therapist since being back at work this month but is planning to continue there in her treatment  discussed some concerns regarding test taking due to her residual anxiety symptoms  and distractibility  reviewed sleep hygiene, adequate exercise and   Nutrition  Past Medical History:   Diagnosis Date    Chronic sinus infection     Depression     Post-op pain        Past Surgical History:   Procedure Laterality Date    ESOPHAGOGASTRODUODENOSCOPY N/A 8/15/2018    Procedure: ESOPHAGOGASTRODUODENOSCOPY (EGD); Surgeon: Liss Brock MD;  [facility];   Service: Gastroenterology    HAND SURGERY Right     KNEE ARTHROSCOPY Left     lateral meniscus tear, ACL tear    KNEE SURGERY Left     [place] REPAIR ACHILLES TENDON,PRIMARY Right 9/30/2019    Procedure: REPAIR TENDON ACHILLES;  Surgeon: Judy Lofton MD; [facility];  Service: Orthopedics    UPPER GASTROINTESTINAL ENDOSCOPY         Current Outpatient Medications   Medication Sig Dispense Refill    desvenlafaxine succinate (PRISTIQ) 50 mg 24 hr tablet Take 1 tablet (50 mg total) by mouth daily 90 tablet 1    fexofenadine (ALLEGRA) 180 MG tablet Take 1 tablet (180 mg total) by mouth daily  0    ibuprofen (MOTRIN) 800 mg tablet Take by mouth every 6 (six) hours as needed for mild pain      levonorgestrel (MIRENA) 20 MCG/24HR IUD 1 Intra Uterine Device by Intrauterine route once for 1 dose (Patient taking differently: 1 each by Intrauterine route once ) 1 each 0    mometasone (NASONEX) 50 mcg/act nasal spray 2 sprays into each nostril daily 17 g 5    traZODone (DESYREL) 50 mg tablet Take 1 tablet (50 mg total) by mouth daily at bedtime as needed for sleep 90 tablet 1     No current facility-administered medications for this visit  No Known Allergies    Review of Systems  As noted in HPI    Video Exam    There were no vitals filed for this visit  Physical Exam   MSE:  Pt with clear and coherent speech, normal rate and volume  Good eye contact, good hygiene  Affect appropriate, mood euthymic  Linear and coherent thought process  No suicidal or homicidal ideation  No psychotic s/s, no hallucination or delusions noted    Cognition appears stable   Insight and judgment is stable     continue with current medications treatment plan as follows:  Pristiq 50 mg daily   Trazodone 50 mg at bedtime as needed    continuing treatment with therapy  Discussed possibility of serotonin syndrome with concomitant Pristiq and trazodone use   Patient verbalized understanding and will continue with meds as noted and notify me of any changes or concerns  Will follow-up in 2-3 months and she will call sooner if any questions or concerns    VIRTUAL VISIT DISCLAIMER    Stacy Valentin verbally agrees to participate in [employer]   Pt is aware that [facility] Services could be limited without vital signs or the ability to perform a full hands-on physical Marlyse Severino understands she or the provider may request at any time to terminate the video visit and request the patient to seek care or treatment in person

## 2021-08-25 NOTE — TELEPHONE ENCOUNTER
[facility] Rehabilitation Daily Progress     Patient Name: Silvia Avila  : 1935  Date of visit: 2021  Visit #:87/12  POC Dates: 21-10/19/21  Referral Diagnosis: Right Shoulder pain  Referring provider: Chuy Avila MD  Visit Diagnosis: Right shoulder pain       Assessment:     Pt returns for a follow-up and feels the pain levels continue but did get 2 hours relief after the last visit.  She felt both modalities were more successful for pain control. Will continue for 1-2 more visits to see if we get longer lasting relief with each visit     Patient is appropriate to continue with skilled physical therapy intervention, as indicated by initial plan of care.    Goals:  See note    Plan / Patient Education:   Plan:  Manual therapy   ultrasound-  1-2 more visits to determine effectiveness of ultrasound and manual therapy combination    Subjective:     Pain Ratin                        0 at rest  Client Self Report:       Think the ultrasound really helped.  It took all the pain away for about 2 hours before it returned.       Objective:     Patient Active Problem List   Diagnosis     Anemia     Type 2 diabetes mellitus (H)     Anxiety     Hypertension     Mixed hyperlipidemia     IgA monoclonal gammopathy of uncertain significance     Personal history of breast cancer     Bilateral sensorineural hearing loss     Gastroesophageal reflux disease     Osteopenia       AROM Measurements in degrees (sitting):   Date:                              21  R Shoulder flexion:     80 and pain   102, pain   100, pain    R Shoulder abduction:  80 and pain   92, pain  78, pain    PROM Measurements in degrees:  Date:                               21  R Shoulder flex:              170-pain  R Shoulder abd:              1 3-5-pain    Treatment Today         Date 21   Exercise          HEP:  Supine cane  Burton Alarcon flex, standing cane abd, wall walk          Pulley's for shoulder flex and abd    HOLD 3-5 seconds X 5 Hold 3-5 seconds X7 Hold 3-5 seconds x 4 Hold 3-5 seconds x 4   UBE 3' 3' 3' 3' 3' 3'    shld isometric flex and ext,  Painfree, light pressure   Hold 5 seconds X 5 reps Hold 3-5 seconds X 5-10 reps                                                                                            TREATMENT MINUTES COMMENTS   Evaluation     Self-care/ Home management     Manual therapy 15 Pt sitting in chair with pillow under arm:  Manual therapy right infraspinatus, deltoids,    Neuromuscular Re-education     Therapeutic Activity     Therapeutic Exercises 5 See flow sheets above  UBE, measurements   Gait training     Modality__________________     ultrasound 8+2 set up Pt sitting, 100%, 1.0w/cm2, 1W/cm2, right superior/posterior shoulder         Total 30    Blank areas are intentional and mean the treatment did not include these items.       Lo Cunningham, PT  8/25/2021

## 2021-09-03 ENCOUNTER — TELEMEDICINE (OUTPATIENT)
Dept: PSYCHIATRY | Facility: CLINIC | Age: 36
End: 2021-09-03
Payer: COMMERCIAL

## 2021-09-03 DIAGNOSIS — F43.12 CHRONIC POST-TRAUMATIC STRESS DISORDER (PTSD): Primary | ICD-10-CM

## 2021-09-03 DIAGNOSIS — F41.1 GENERALIZED ANXIETY DISORDER WITH PANIC ATTACKS: ICD-10-CM

## 2021-09-03 DIAGNOSIS — F41.0 GENERALIZED ANXIETY DISORDER WITH PANIC ATTACKS: ICD-10-CM

## 2021-09-03 DIAGNOSIS — R41.840 ATTENTION AND CONCENTRATION DEFICIT: ICD-10-CM

## 2021-09-03 DIAGNOSIS — F33.1 MODERATE EPISODE OF RECURRENT MAJOR DEPRESSIVE DISORDER (HCC): ICD-10-CM

## 2021-09-03 PROCEDURE — 99214 OFFICE O/P EST MOD 30 MIN: CPT | Performed by: PHYSICIAN ASSISTANT

## 2021-09-03 RX ORDER — DESVENLAFAXINE 100 MG/1
100 TABLET, EXTENDED RELEASE ORAL DAILY
Qty: 90 TABLET | Refills: 1 | Status: SHIPPED | OUTPATIENT
Start: 2021-09-03 | End: 2022-01-07 | Stop reason: SDUPTHER
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
## 2021-12-17 ENCOUNTER — TELEPHONE (OUTPATIENT)
Dept: PSYCHIATRY | Facility: CLINIC | Age: 36
End: 2021-12-17

## 2022-01-07 ENCOUNTER — TELEMEDICINE (OUTPATIENT)
Dept: PSYCHIATRY | Facility: CLINIC | Age: 37
End: 2022-01-07
Payer: COMMERCIAL

## 2022-01-07 DIAGNOSIS — F51.01 PRIMARY INSOMNIA: ICD-10-CM

## 2022-01-07 DIAGNOSIS — F33.1 MODERATE EPISODE OF RECURRENT MAJOR DEPRESSIVE DISORDER (HCC): ICD-10-CM

## 2022-01-07 DIAGNOSIS — F43.12 CHRONIC POST-TRAUMATIC STRESS DISORDER (PTSD): Primary | ICD-10-CM

## 2022-01-07 DIAGNOSIS — F41.0 GENERALIZED ANXIETY DISORDER WITH PANIC ATTACKS: ICD-10-CM

## 2022-01-07 DIAGNOSIS — R41.840 ATTENTION AND CONCENTRATION DEFICIT: ICD-10-CM

## 2022-01-07 DIAGNOSIS — F41.1 GENERALIZED ANXIETY DISORDER WITH PANIC ATTACKS: ICD-10-CM

## 2022-01-07 PROCEDURE — 99214 OFFICE O/P EST MOD 30 MIN: CPT | Performed by: PHYSICIAN ASSISTANT

## 2022-01-07 RX ORDER — TRAZODONE HYDROCHLORIDE 50 MG/1
50 TABLET ORAL
Qty: 90 TABLET | Refills: 1 | Status: SHIPPED | OUTPATIENT
Start: 2022-01-07 | End: 2022-06-24 | Stop reason: SDUPTHER

## 2022-01-07 RX ORDER — DESVENLAFAXINE 50 MG/1
50 TABLET, EXTENDED RELEASE ORAL DAILY
Qty: 90 TABLET | Refills: 1 | Status: SHIPPED | OUTPATIENT
Start: 2022-01-07 | End: 2022-04-08 | Stop reason: SDUPTHER

## 2022-01-07 RX ORDER — DESVENLAFAXINE 100 MG/1
100 TABLET, EXTENDED RELEASE ORAL DAILY
Qty: 90 TABLET | Refills: 1 | Status: SHIPPED | OUTPATIENT
Start: 2022-01-07 | End: 2022-04-08 | Stop reason: SDUPTHER

## 2022-01-07 NOTE — PSYCH
This note was not shared with the patient due to patient requested  Virtual Regular Visit    Verification of patient location:    Patient is located in the following state in which I hold an active license [place]      Assessment/Plan:    Problem List Items Addressed This Visit     None      Visit Diagnoses     Chronic post-traumatic stress disorder (PTSD)    -  Primary    Moderate episode of recurrent major depressive disorder ([facility] [address] )        Attention and concentration deficit        Generalized anxiety disorder with panic attacks              Goals addressed in session: Goal 1          Reason for visit is   Chief Complaint   Patient presents with    Follow-up    Medication Management    Virtual Regular Visit        Encounter provider Neptali Mccormack PA-C    Provider located at [facility] [id number]      Recent Visits  No visits were found meeting these conditions  Showing recent visits within past 7 days and meeting all other requirements  Today's Visits  Date Type Provider Dept   01/07/22 [address] [place], [place] [address] today's visits and meeting all other requirements  Future Appointments  No visits were found meeting these conditions  Showing future appointments within next 150 days and meeting all other requirements       The patient was identified by name and date of birth  Anuj Cohen was informed that this is a telemedicine visit and that the [facility] and patient was informed this is a secure, HIPAA-complaint platform  She agrees to proceed     My office door was closed  No one else was in the room  She acknowledged consent and understanding of privacy and security of the video platform  The patient has agreed to participate and understands they can discontinue the visit at any time  Patient is aware this is a billable service  Subjective  Tierra Toure is a 31 y o  female with history chronic PTSD, anxiety and depression   HPI   Radha was seen for follow-up anxiety, depression, PTSD and for medication management  Reported that she was having an increase in panic attacks in December  States she was having 4-5 panic attacks per day  States they are a little less but still 1-2 per day  Work schedule has been hectic and on 24 hour call every other night  Trying to "work around" panic attacks  Typically wakes up in panic and states mornings are when she has most of her anxiety  Was having nightmares often November in December  Nightmares are less recently  Reports that her depressive symptoms are improved with the increase in Pristiq  Denies any suicidal ideation  States that her death wish thoughts have also significantly diminished  States that she is better able to calm herself when she feels overwhelmed  Sleep is variable  States that recently is improved  Trazodone has been helpful  Reports that her appetite is adequate  Reports that physically she is feeling well  Her Achilles has healed states that she is able to walk without pain or discomfort  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  Focus and concentration have been gradually improving with increase in medication as well  States that she is more aware of things and also more aware of her anxiety  Has adequate interest and motivation  Has been getting outside for walks after work  Continues to se therapist on a regular  Past Medical History:   Diagnosis Date    Chronic sinus infection     Depression     Post-op pain        Past Surgical History:   Procedure Laterality Date    ESOPHAGOGASTRODUODENOSCOPY N/A 8/15/2018    Procedure: ESOPHAGOGASTRODUODENOSCOPY (EGD); Surgeon: Amanuel Verde MD;  [facility];   Service: Gastroenterology    HAND SURGERY Right     KNEE ARTHROSCOPY Left     lateral meniscus tear, ACL tear    KNEE SURGERY Left     [place] REPAIR ACHILLES TENDON,PRIMARY Right 9/30/2019    Procedure: REPAIR TENDON ACHILLES;  Surgeon: Melvina Grant MD;  [facility];  Service: Orthopedics    UPPER GASTROINTESTINAL ENDOSCOPY         Current Outpatient Medications   Medication Sig Dispense Refill    desvenlafaxine succinate (PRISTIQ) 100 mg 24 hr tablet Take 1 tablet (100 mg total) by mouth daily 90 tablet 1    fexofenadine (ALLEGRA) 180 MG tablet Take 1 tablet (180 mg total) by mouth daily  0    ibuprofen (MOTRIN) 800 mg tablet Take by mouth every 6 (six) hours as needed for mild pain      levonorgestrel (MIRENA) 20 MCG/24HR IUD 1 Intra Uterine Device by Intrauterine route once for 1 dose (Patient taking differently: 1 each by Intrauterine route once ) 1 each 0    mometasone (NASONEX) 50 mcg/act nasal spray 2 sprays into each nostril daily 17 g 5    traZODone (DESYREL) 50 mg tablet Take 1 tablet (50 mg total) by mouth daily at bedtime as needed for sleep 90 tablet 1     No current facility-administered medications for this visit  No Known Allergies    Review of Systems  As noted in HPI  Video Exam    There were no vitals filed for this visit  Physical Exam   Mental status exam:   Speech is clear and coherent, normal rate and volume  Adequate hygiene, good eye contact  Affect is less anxious, less depressed, mood is congruent  Linear and coherent thought process   No suicidal or homicidal ideation   No psychotic signs or symptoms noted, no hallucinations or delusions voiced   Cognition appears intact  Insight and judgment is intact     Medications and treatment plan as follows:  Pristiq 100 mg daily will increase to 150 mg total per day, take 100 mg the morning 50 mg in the p m  Trazodone 50 mg at bedtime as needed  I spent 30 minutes directly with the patient during this visit    [address] verbally agrees to participate in [employer]   Pt is aware that [employer] could be limited without vital signs or the ability to perform a full hands-on physical Addisonjessica Quinterosgarcia understands she or the provider may request at any time to terminate the video visit and request the patient to seek care or treatment in person

## 2022-04-08 ENCOUNTER — TELEMEDICINE (OUTPATIENT)
Dept: PSYCHIATRY | Facility: CLINIC | Age: 37
End: 2022-04-08
Payer: COMMERCIAL

## 2022-04-08 DIAGNOSIS — F43.12 CHRONIC POST-TRAUMATIC STRESS DISORDER (PTSD): Primary | ICD-10-CM

## 2022-04-08 DIAGNOSIS — R41.840 ATTENTION AND CONCENTRATION DEFICIT: ICD-10-CM

## 2022-04-08 DIAGNOSIS — F41.0 GENERALIZED ANXIETY DISORDER WITH PANIC ATTACKS: ICD-10-CM

## 2022-04-08 DIAGNOSIS — F33.1 MODERATE EPISODE OF RECURRENT MAJOR DEPRESSIVE DISORDER (HCC): ICD-10-CM

## 2022-04-08 DIAGNOSIS — F41.1 GENERALIZED ANXIETY DISORDER WITH PANIC ATTACKS: ICD-10-CM

## 2022-04-08 PROCEDURE — 99214 OFFICE O/P EST MOD 30 MIN: CPT | Performed by: PHYSICIAN ASSISTANT

## 2022-04-08 RX ORDER — HYDROXYZINE HYDROCHLORIDE 10 MG/1
10 TABLET, FILM COATED ORAL EVERY 6 HOURS PRN
Qty: 30 TABLET | Refills: 1 | Status: SHIPPED | OUTPATIENT
Start: 2022-04-08

## 2022-04-08 RX ORDER — DESVENLAFAXINE 50 MG/1
50 TABLET, EXTENDED RELEASE ORAL DAILY
Qty: 90 TABLET | Refills: 1 | Status: SHIPPED | OUTPATIENT
Start: 2022-04-08

## 2022-04-08 RX ORDER — DESVENLAFAXINE 100 MG/1
100 TABLET, EXTENDED RELEASE ORAL DAILY
Qty: 90 TABLET | Refills: 1 | Status: SHIPPED | OUTPATIENT
Start: 2022-04-08

## 2022-04-08 NOTE — PSYCH
This note was not shared with the patient due to reasonable likelihood of causing patient harm    Virtual Regular Visit    Verification of patient location:    Patient is located in the following state in which I hold an active license [place]      Assessment/Plan:    Problem List Items Addressed This Visit     None      Visit Diagnoses     Chronic post-traumatic stress disorder (PTSD)    -  Primary    Relevant Medications    hydrOXYzine HCL (ATARAX) 10 mg tablet    desvenlafaxine (PRISTIQ) 100 mg 24 hr tablet    desvenlafaxine succinate (PRISTIQ) 50 mg 24 hr tablet    Moderate episode of recurrent major depressive disorder (HCC)        Relevant Medications    hydrOXYzine HCL (ATARAX) 10 mg tablet    desvenlafaxine (PRISTIQ) 100 mg 24 hr tablet    desvenlafaxine succinate (PRISTIQ) 50 mg 24 hr tablet    Generalized anxiety disorder with panic attacks        Relevant Medications    hydrOXYzine HCL (ATARAX) 10 mg tablet    desvenlafaxine (PRISTIQ) 100 mg 24 hr tablet    desvenlafaxine succinate (PRISTIQ) 50 mg 24 hr tablet    Attention and concentration deficit        Relevant Medications    desvenlafaxine (PRISTIQ) 100 mg 24 hr tablet          Goals addressed in session: Goal 1          Reason for visit is   Chief Complaint   Patient presents with    Medication Management    Follow-up    Virtual Regular Visit        Encounter provider Eden Townsend PA-C    Provider located at [address] [address]      Recent Visits  No visits were found meeting these conditions  Showing recent visits within past 7 days and meeting all other requirements  Today's Visits  Date Type Provider Dept   [facility] visits and meeting all other requirements  Future Appointments  No visits were found meeting these conditions    Showing future appointments within next 150 days and meeting all other requirements       The patient was identified by name and date of birth  Faustino Melendrez was informed that this is a telemedicine visit and that the visit is being conducted through[employer] and patient was informed this is a secure, HIPAA-complaint platform  She agrees to proceed     My office door was closed  No one else was in the room  She acknowledged consent and understanding of privacy and security of the video platform  The patient has agreed to participate and understands they can discontinue the visit at any time  Patient is aware this is a billable service  Subjective  Faustino Melendrez is a 31 y o  female with history of anxiety, depression, PTSD   HPI   Radha was seen for follow-up of anxiety, depression, PTSD and for medication management  Continues with panic attacks most every day  States mornings are typically harder and things do improve as day goes on  States the panic attacks last about 20 minutes  She then takes her Pristiq in the morning and has also been utilizing CBD gummies  Although she continues with high anxiety and panic attacks states she feels that the medications help make things more manageable  She had significant amount anxiety regarding her [employer]  MLE  She took the exam and found out last week that she passed  She had been working with a therapist but states that she has not seen her for few weeks now  Largely due to her schedule with work and studying for the exam     She plans to get back into regular therapy to continue to work on her past traumas  Overall she has been sleeping fairly well at night  Taking trazodone 1-2 times per week which is helpful  Notes improvement with Pristiq and no side effects noted  Physically she has been doing well  States that her ankle has been stable as far as standing all day  Reports that she does not feel overly depressed    She has good interest and motivation and is able to enjoy herself  No suicidality, no psychotic signs or symptoms  Physically currently doing well and no new medications are medical issues noted  Medication list reviewed and updated  Past Medical History:   Diagnosis Date    Chronic sinus infection     Depression     Post-op pain        Past Surgical History:   Procedure Laterality Date    ESOPHAGOGASTRODUODENOSCOPY N/A 8/15/2018    Procedure: ESOPHAGOGASTRODUODENOSCOPY (EGD); Surgeon: Danii Salamanca MD;  [facility]; Service: Gastroenterology    HAND SURGERY Right     KNEE ARTHROSCOPY Left     lateral meniscus tear, ACL tear    KNEE SURGERY Left     [place] REPAIR ACHILLES TENDON,PRIMARY Right 9/30/2019    Procedure: REPAIR TENDON ACHILLES;  Surgeon: Radha Robles MD;  [facility];  Service: Orthopedics    UPPER GASTROINTESTINAL ENDOSCOPY         Current Outpatient Medications   Medication Sig Dispense Refill    desvenlafaxine (PRISTIQ) 100 mg 24 hr tablet Take 1 tablet (100 mg total) by mouth daily 90 tablet 1    desvenlafaxine succinate (PRISTIQ) 50 mg 24 hr tablet Take 1 tablet (50 mg total) by mouth daily 90 tablet 1    fexofenadine (ALLEGRA) 180 MG tablet Take 1 tablet (180 mg total) by mouth daily  0    hydrOXYzine HCL (ATARAX) 10 mg tablet Take 1 tablet (10 mg total) by mouth every 6 (six) hours as needed for anxiety 30 tablet 1    ibuprofen (MOTRIN) 800 mg tablet Take by mouth every 6 (six) hours as needed for mild pain      levonorgestrel (MIRENA) 20 MCG/24HR IUD 1 Intra Uterine Device by Intrauterine route once for 1 dose (Patient taking differently: 1 each by Intrauterine route once ) 1 each 0    mometasone (NASONEX) 50 mcg/act nasal spray 2 sprays into each nostril daily 17 g 5    traZODone (DESYREL) 50 mg tablet Take 1 tablet (50 mg total) by mouth daily at bedtime as needed for sleep 90 tablet 1     No current facility-administered medications for this visit          No Known Allergies    Review of Systems  As noted in HPI  Video Exam    There were no vitals filed for this visit  Physical Exam   Status exam:  Speech clear coherent, normal rate volume   Adequate hygiene, good eye contact   Affect is currently appropriate, pleasant, mood euthymic   Linear and coherent thought process   No suicidal or homicidal ideation   No psychotic signs or symptoms noted, no hallucinations, no delusions voiced   Cognition appears intact  Insight judgment appears intact    Medications and treatment plan as follows: Will add low-dose hydroxyzine 10 mg every 6 hours as needed for anxiety/panic attacks  Continue Pristiq 150 mg daily   Trazodone 50 mg at bedtime p r n , typically taking 1-2 times per week  Follow-up in three months she will call me sooner if any questions or concerns  I spent 30 minutes directly with the patient during this visit    [address] verbally agrees to participate in [employer]  Pt is aware that [employer] could be limited without vital signs or the ability to perform a full hands-on physical Fenwick Bene understands she or the provider may request at any time to terminate the video visit and request the patient to seek care or treatment in person

## 2022-04-08 NOTE — BH TREATMENT PLAN
TREATMENT PLAN (Medication Management Only)        [employer] [facility]    Name and Date of Birth:  Emma Archuleta 31 y o  1985  Date of Treatment Plan: April 8, 2022  Diagnosis/Diagnoses:    1  Chronic post-traumatic stress disorder (PTSD)    2  Moderate episode of recurrent major depressive disorder ([address] )    3  Generalized anxiety disorder with panic attacks    4  Attention and concentration deficit      Strengths/Personal Resources for Self-Care: taking medications as prescribed, motivation for treatment, well educated  Area/Areas of need (in own words): anxiety symptoms  1  Long Term Goal: continue improvement in anxiety  Target Date:3 months - 7/8/2022  Person/Persons responsible for completion of goal: Radha  2  Short Term Objective (s) - How will we reach this goal?:   A  Provider new recommended medication/dosage changes and/or continue medication(s): P r n  hydroxyzine, continue Pristiq, continue p r n  trazodone  B  N/A   C  N/A  Target Date:3 months - 7/8/2022  Person/Persons Responsible for Completion of Goal: Radha  Progress Towards Goals: stable  Treatment Modality: medication management every 3 months  Review due 180 days from date of this plan: 6 months - 10/8/2022  Expected length of service: ongoing treatment  My Physician/PA/NP and I have developed this plan together and I agree to work on the goals and objectives  I understand the treatment goals that were developed for my treatment

## 2022-05-24 ENCOUNTER — ANNUAL EXAM (OUTPATIENT)
Dept: OBGYN CLINIC | Facility: CLINIC | Age: 37
End: 2022-05-24
Payer: COMMERCIAL

## 2022-05-24 VITALS
DIASTOLIC BLOOD PRESSURE: 90 MMHG | SYSTOLIC BLOOD PRESSURE: 144 MMHG | WEIGHT: 255.4 LBS | HEIGHT: 68 IN | BODY MASS INDEX: 38.71 KG/M2

## 2022-05-24 DIAGNOSIS — Z11.3 SCREENING FOR STDS (SEXUALLY TRANSMITTED DISEASES): Primary | ICD-10-CM

## 2022-05-24 DIAGNOSIS — Z01.419 WOMEN'S ANNUAL ROUTINE GYNECOLOGICAL EXAMINATION: ICD-10-CM

## 2022-05-24 PROCEDURE — 87491 CHLMYD TRACH DNA AMP PROBE: CPT | Performed by: OBSTETRICS & GYNECOLOGY

## 2022-05-24 PROCEDURE — G0476 HPV COMBO ASSAY CA SCREEN: HCPCS | Performed by: OBSTETRICS & GYNECOLOGY

## 2022-05-24 PROCEDURE — 87591 N.GONORRHOEAE DNA AMP PROB: CPT | Performed by: OBSTETRICS & GYNECOLOGY

## 2022-05-24 PROCEDURE — G0145 SCR C/V CYTO,THINLAYER,RESCR: HCPCS | Performed by: OBSTETRICS & GYNECOLOGY

## 2022-05-24 PROCEDURE — S0612 ANNUAL GYNECOLOGICAL EXAMINA: HCPCS | Performed by: OBSTETRICS & GYNECOLOGY

## 2022-05-24 NOTE — PROGRESS NOTES
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 [facility]  Her current method of contraception includes the Mirena IUD  This is placed in the year 2016 it should be replaced in the year 2023  She is in a stable relationship  She feels safe at home  She has received COVID vaccine  She denies any history of abnormal Pap smear  She is not happy with her weight  She has a dentist on a regular basis  She does have a history of anxiety/depression is currently on Pristiq  This is working well  Family history significant for depression hypertension leukemia and diabetes  She is not a smoker  The following portions of the patient's history were reviewed and updated as appropriate: allergies, current medications, past family history, past medical history, past social history, past surgical history and problem list     Review of Systems   All other systems reviewed and are negative  Objective:      /90   Ht 5' 8" (1 727 m)   Wt 116 kg (255 lb 6 4 oz)   BMI 38 83 kg/m²          Physical Exam  Vitals reviewed  Exam conducted with a chaperone present  Constitutional:       Appearance: Normal appearance  HENT:      Head: Normocephalic and atraumatic  Mouth/Throat:      Mouth: Mucous membranes are moist    Eyes:      Extraocular Movements: Extraocular movements intact        Pupils: Pupils are equal, round, and reactive to light  Cardiovascular:      Rate and Rhythm: Normal rate and regular rhythm  Pulses: Normal pulses  Heart sounds: Normal heart sounds  Pulmonary:      Effort: Pulmonary effort is normal       Breath sounds: Normal breath sounds  Chest:   Breasts: Breasts are symmetrical       Right: Normal  No swelling, bleeding, inverted nipple, mass, nipple discharge, skin change, tenderness, axillary adenopathy or supraclavicular adenopathy  Left: Normal  No swelling, bleeding, inverted nipple, mass, nipple discharge, skin change, tenderness, axillary adenopathy or supraclavicular adenopathy  Abdominal:      General: Abdomen is flat  Bowel sounds are normal  There is no distension  Palpations: Abdomen is soft  There is no hepatomegaly, splenomegaly or mass  Tenderness: There is no abdominal tenderness  There is no guarding or rebound  Hernia: No hernia is present  There is no hernia in the umbilical area, ventral area, left inguinal area or right inguinal area  Genitourinary:     General: Normal vulva  Pubic Area: No rash or pubic lice  Labia:         Right: No rash, tenderness, lesion or injury  Left: No rash, tenderness, lesion or injury  Urethra: No prolapse, urethral pain, urethral swelling or urethral lesion  Vagina: Normal  No signs of injury and foreign body  No vaginal discharge, erythema, tenderness, bleeding, lesions or prolapsed vaginal walls  Cervix: Normal       Uterus: Normal  Not deviated, not enlarged, not fixed, not tender and no uterine prolapse  Comments: The external genitalia normal limits the vagina is clean  The cervix is closed  Uterus is anterior normal size there is no cervical motion tenderness  IUD string was visualized  The adnexa clear bilaterally  There is no evidence of prolapse  A Pap smear was performed  The urethra was normal appearance  Musculoskeletal:         General: Normal range of motion  Cervical back: Normal range of motion and neck supple  Lymphadenopathy:      Upper Body:      Right upper body: No supraclavicular or axillary adenopathy  Left upper body: No supraclavicular or axillary adenopathy  Skin:     General: Skin is warm and dry  Neurological:      General: No focal deficit present  Mental Status: She is alert and oriented to person, place, and time  Psychiatric:         Mood and Affect: Mood normal          Thought Content:  Thought content normal

## 2022-05-24 NOTE — PATIENT INSTRUCTIONS
The patient was informed of a stable gyn examination  A Pap smear was performed  She will continue to watch her blood pressure have check will  She states she has a white coat syndrome  She would like to lose more weight  She is happy with the IUD  Would expect by iron she may consider replacement  She should return my office in 1 year unless new problems arise

## 2022-05-27 LAB
HPV HR 12 DNA CVX QL NAA+PROBE: NEGATIVE
HPV16 DNA CVX QL NAA+PROBE: NEGATIVE
HPV18 DNA CVX QL NAA+PROBE: NEGATIVE

## 2022-05-31 LAB
C TRACH DNA SPEC QL NAA+PROBE: NEGATIVE
N GONORRHOEA DNA SPEC QL NAA+PROBE: NEGATIVE

## 2022-06-01 LAB
LAB AP GYN PRIMARY INTERPRETATION: NORMAL
Lab: NORMAL

## 2022-06-03 ENCOUNTER — TELEPHONE (OUTPATIENT)
Dept: PSYCHIATRY | Facility: CLINIC | Age: 37
End: 2022-06-03

## 2022-06-22 ENCOUNTER — TRANSCRIBE ORDERS (OUTPATIENT)
Dept: LAB | Facility: HOSPITAL | Age: 37
End: 2022-06-22

## 2022-06-22 ENCOUNTER — APPOINTMENT (OUTPATIENT)
Dept: LAB | Facility: HOSPITAL | Age: 37
End: 2022-06-22
Payer: COMMERCIAL

## 2022-06-22 ENCOUNTER — APPOINTMENT (OUTPATIENT)
Dept: LAB | Facility: HOSPITAL | Age: 37
End: 2022-06-22
Attending: OBSTETRICS & GYNECOLOGY
Payer: COMMERCIAL

## 2022-06-22 DIAGNOSIS — Z11.3 SCREENING FOR STDS (SEXUALLY TRANSMITTED DISEASES): ICD-10-CM

## 2022-06-22 DIAGNOSIS — Z00.8 HEALTH EXAMINATION IN POPULATION SURVEY: ICD-10-CM

## 2022-06-22 DIAGNOSIS — Z00.8 HEALTH EXAMINATION IN POPULATION SURVEY: Primary | ICD-10-CM

## 2022-06-22 LAB
CHOLEST SERPL-MCNC: 142 MG/DL
HAV IGM SER QL: NORMAL
HBV CORE IGM SER QL: NORMAL
HBV SURFACE AG SER QL: NORMAL
HCV AB SER QL: NORMAL
HDLC SERPL-MCNC: 49 MG/DL
LDLC SERPL CALC-MCNC: 69 MG/DL (ref 0–100)
NONHDLC SERPL-MCNC: 93 MG/DL
TRIGL SERPL-MCNC: 122 MG/DL

## 2022-06-22 PROCEDURE — 86592 SYPHILIS TEST NON-TREP QUAL: CPT

## 2022-06-22 PROCEDURE — 36415 COLL VENOUS BLD VENIPUNCTURE: CPT

## 2022-06-22 PROCEDURE — 86695 HERPES SIMPLEX TYPE 1 TEST: CPT

## 2022-06-22 PROCEDURE — 80074 ACUTE HEPATITIS PANEL: CPT

## 2022-06-22 PROCEDURE — 87389 HIV-1 AG W/HIV-1&-2 AB AG IA: CPT

## 2022-06-22 PROCEDURE — 80061 LIPID PANEL: CPT

## 2022-06-22 PROCEDURE — 86696 HERPES SIMPLEX TYPE 2 TEST: CPT

## 2022-06-22 PROCEDURE — 83036 HEMOGLOBIN GLYCOSYLATED A1C: CPT

## 2022-06-23 LAB
EST. AVERAGE GLUCOSE BLD GHB EST-MCNC: 131 MG/DL
HBA1C MFR BLD: 6.2 %
HIV 1+2 AB+HIV1 P24 AG SERPL QL IA: NORMAL
HSV1 IGG SER IA-ACNC: <0.91 INDEX (ref 0–0.9)
HSV2 IGG SER IA-ACNC: <0.91 INDEX (ref 0–0.9)
RPR SER QL: NORMAL

## 2022-06-24 ENCOUNTER — TELEMEDICINE (OUTPATIENT)
Dept: PSYCHIATRY | Facility: CLINIC | Age: 37
End: 2022-06-24
Payer: COMMERCIAL

## 2022-06-24 DIAGNOSIS — F51.01 PRIMARY INSOMNIA: ICD-10-CM

## 2022-06-24 DIAGNOSIS — F41.0 GENERALIZED ANXIETY DISORDER WITH PANIC ATTACKS: ICD-10-CM

## 2022-06-24 DIAGNOSIS — F43.12 CHRONIC POST-TRAUMATIC STRESS DISORDER (PTSD): ICD-10-CM

## 2022-06-24 DIAGNOSIS — F33.1 MODERATE EPISODE OF RECURRENT MAJOR DEPRESSIVE DISORDER (HCC): Primary | ICD-10-CM

## 2022-06-24 DIAGNOSIS — F41.1 GENERALIZED ANXIETY DISORDER WITH PANIC ATTACKS: ICD-10-CM

## 2022-06-24 PROCEDURE — 99214 OFFICE O/P EST MOD 30 MIN: CPT | Performed by: PHYSICIAN ASSISTANT

## 2022-06-24 RX ORDER — TRAZODONE HYDROCHLORIDE 50 MG/1
50 TABLET ORAL
Qty: 90 TABLET | Refills: 1 | Status: SHIPPED | OUTPATIENT
Start: 2022-06-24

## 2022-06-24 NOTE — PSYCH
This note was not shared with the patient due to patient requested    Virtual Regular Visit    Verification of patient location:    Patient is located in the following state in which I hold an active license [place]      Assessment/Plan:    Problem List Items Addressed This Visit    None     Visit Diagnoses     Moderate episode of recurrent major depressive disorder ([facility] [address] )    -  Primary    Relevant Medications    traZODone (DESYREL) 50 mg tablet    Generalized anxiety disorder with panic attacks        Relevant Medications    traZODone (DESYREL) 50 mg tablet    Chronic post-traumatic stress disorder (PTSD)        Relevant Medications    traZODone (DESYREL) 50 mg tablet    Primary insomnia        Relevant Medications    traZODone (DESYREL) 50 mg tablet          Goals addressed in session: Goal 1          Reason for visit is   Chief Complaint   Patient presents with    Follow-up    Medication Management    Virtual Regular Visit        Encounter provider Guy Ramírez PA-C    Provider located at [address]      Recent Visits  No visits were found meeting these conditions  Showing recent visits within past 7 days and meeting all other requirements  Today's Visits  Date Type Provider Dept   06/24/22 Telemedicine Guy Ramírez PA-C [facility] today's visits and meeting all other requirements  Future Appointments  No visits were found meeting these conditions  Showing future appointments within next 150 days and meeting all other requirements       The patient was identified by name and date of birth  Berhane Keane was informed that this is a telemedicine visit and that the [facility] and patient was informed this is a secure, HIPAA-complaint platform  She agrees to proceed     My office door was closed  No one else was in the room    She acknowledged consent and understanding of privacy and security of the video platform  The patient has agreed to participate and understands they can discontinue the visit at any time  Patient is aware this is a billable service  Subjective  Piyush Aden is a 31 y o  female with history of chronic PTSD, anxiety and major depression   HPI   Radha was seen for follow-up of anxiety, depression, PTSD and for medication management  Moriah Fitzgerald reports that her anxiety has overall been improving since our last visit in April  Her panic attacks are occurring about once per week  She has been taking Pristiq 150 mg morning and CBD gummies which have been helpful  Also states the p r n  trazodone is helpful for her sleep  States that a few weeks ago she was feeling more depressed and having more tearful episodes  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  Also has struggles due to working night shift so sleep schedule was very disrupted  States that her focus concentration were decreased  Also states that she has some stressors with peers  States that her current rotation has been better and she reports that her moods are improved  States that she has been trying to focus more on self-care over the past week or two  She spent some time with a friend and has been trying to go for walks for exercise  We also discussed sleep hygiene and trying to maintain as stable as of sleep schedule as she is able to due to her work  Denies any adverse effects with her medications  Overall feels as though her moods have been significantly improved since being on test venlafaxine  No new medical issues or medications noted  Past Medical History:   Diagnosis Date    Chronic sinus infection     Depression     Post-op pain        Past Surgical History:   Procedure Laterality Date    ESOPHAGOGASTRODUODENOSCOPY N/A 8/15/2018    Procedure: ESOPHAGOGASTRODUODENOSCOPY (EGD);   Surgeon: Nuno Duke MD; [facility]; Service: Gastroenterology    HAND SURGERY Right     KNEE ARTHROSCOPY Left     lateral meniscus tear, ACL tear    KNEE SURGERY Left     [place] REPAIR ACHILLES TENDON,PRIMARY Right 9/30/2019    Procedure: REPAIR TENDON ACHILLES;  Surgeon: Isela Patel MD;  [facility];  Service: Orthopedics    UPPER GASTROINTESTINAL ENDOSCOPY         Current Outpatient Medications   Medication Sig Dispense Refill    traZODone (DESYREL) 50 mg tablet Take 1 tablet (50 mg total) by mouth daily at bedtime as needed for sleep 90 tablet 1    desvenlafaxine (PRISTIQ) 100 mg 24 hr tablet Take 1 tablet (100 mg total) by mouth daily 90 tablet 1    desvenlafaxine succinate (PRISTIQ) 50 mg 24 hr tablet Take 1 tablet (50 mg total) by mouth daily 90 tablet 1    fexofenadine (ALLEGRA) 180 MG tablet Take 1 tablet (180 mg total) by mouth daily (Patient not taking: Reported on 5/24/2022)  0    hydrOXYzine HCL (ATARAX) 10 mg tablet Take 1 tablet (10 mg total) by mouth every 6 (six) hours as needed for anxiety (Patient not taking: Reported on 5/24/2022) 30 tablet 1    ibuprofen (MOTRIN) 800 mg tablet Take by mouth every 6 (six) hours as needed for mild pain (Patient not taking: Reported on 5/24/2022)      levonorgestrel (MIRENA) 20 MCG/24HR IUD 1 Intra Uterine Device by Intrauterine route once for 1 dose (Patient taking differently: 1 each by Intrauterine route once ) 1 each 0    mometasone (NASONEX) 50 mcg/act nasal spray 2 sprays into each nostril daily (Patient not taking: Reported on 5/24/2022) 17 g 5     No current facility-administered medications for this visit  No Known Allergies    Review of Systems  As noted in HPI  Video Exam    There were no vitals filed for this visit      Physical Exam   MSE:  Pt with clear and coherent speech, normal rate and volume  Adequate hygiene, good eye contact   Affect is anxious, mood congruent  Linear and coherent thought process  No suicidal or homicidal ideation  No psychotic s/s, no hallucinations and no delusions voiced  cognition appears intact  Insight and judgement appears intact      Medications and treatment plan as follows:  Continue Pristiq 150 mg total per day  Continue Trazodone 50 mg at bedtime p r n  Hydroxyzine 10 mg p r n , has not needed  Will continue follow-up with her therapist on a regular basis  Continue to work on self-care, regular exercise  Will follow-up with me in about two months and she will call me sooner if any questions or concerns  I spent 30 minutes directly with the patient during this visit    VIRTUAL VISIT 98 Sahra Casanova verbally agrees to participate in [employer]  Pt is aware that [employer] could be limited without vital signs or the ability to perform a full hands-on physical [employer] understands she or the provider may request at any time to terminate the video visit and request the patient to seek care or treatment in person

## 2022-08-19 ENCOUNTER — TELEMEDICINE (OUTPATIENT)
Dept: PSYCHIATRY | Facility: CLINIC | Age: 37
End: 2022-08-19
Payer: COMMERCIAL

## 2022-08-19 DIAGNOSIS — F33.1 MODERATE EPISODE OF RECURRENT MAJOR DEPRESSIVE DISORDER (HCC): Primary | ICD-10-CM

## 2022-08-19 DIAGNOSIS — F41.1 GENERALIZED ANXIETY DISORDER WITH PANIC ATTACKS: ICD-10-CM

## 2022-08-19 DIAGNOSIS — F43.12 CHRONIC POST-TRAUMATIC STRESS DISORDER (PTSD): ICD-10-CM

## 2022-08-19 DIAGNOSIS — F41.0 GENERALIZED ANXIETY DISORDER WITH PANIC ATTACKS: ICD-10-CM

## 2022-08-19 DIAGNOSIS — R41.840 ATTENTION AND CONCENTRATION DEFICIT: ICD-10-CM

## 2022-08-19 PROCEDURE — 99214 OFFICE O/P EST MOD 30 MIN: CPT | Performed by: PHYSICIAN ASSISTANT

## 2022-08-19 RX ORDER — DESVENLAFAXINE 50 MG/1
50 TABLET, EXTENDED RELEASE ORAL DAILY
Qty: 90 TABLET | Refills: 1 | Status: SHIPPED | OUTPATIENT
Start: 2022-08-19

## 2022-08-19 RX ORDER — DESVENLAFAXINE 100 MG/1
100 TABLET, EXTENDED RELEASE ORAL DAILY
Qty: 90 TABLET | Refills: 1 | Status: SHIPPED | OUTPATIENT
Start: 2022-08-19

## 2022-08-19 NOTE — PSYCH
This note was not shared with the patient due to patient requested      Virtual Regular Visit    Verification of patient location:    Patient is located in the following state in which I hold an active license [place]      Assessment/Plan:    Problem List Items Addressed This Visit    None     Visit Diagnoses     Moderate episode of recurrent major depressive disorder ([facility] [address] )    -  Primary    Relevant Medications    desvenlafaxine succinate (PRISTIQ) 50 mg 24 hr tablet    desvenlafaxine (PRISTIQ) 100 mg 24 hr tablet    Generalized anxiety disorder with panic attacks        Relevant Medications    desvenlafaxine succinate (PRISTIQ) 50 mg 24 hr tablet    desvenlafaxine (PRISTIQ) 100 mg 24 hr tablet    Chronic post-traumatic stress disorder (PTSD)        Relevant Medications    desvenlafaxine succinate (PRISTIQ) 50 mg 24 hr tablet    desvenlafaxine (PRISTIQ) 100 mg 24 hr tablet    Attention and concentration deficit        Relevant Medications    desvenlafaxine (PRISTIQ) 100 mg 24 hr tablet          Goals addressed in session: Goal 1          Reason for visit is   Chief Complaint   Patient presents with    Follow-up    Medication Management    Virtual Regular Visit        Encounter provider Lin Richardson PA-C    Provider located at [address] [address]      Recent Visits  No visits were found meeting these conditions  Showing recent visits within past 7 days and meeting all other requirements  Today's Visits  Date Type Provider Dept   08/19/22 [facility] today's visits and meeting all other requirements  Future Appointments  No visits were found meeting these conditions  Showing future appointments within next 150 days and meeting all other requirements       The patient was identified by name and date of birth   Samantha Davenport was informed that this is a telemedicine visit and that the visit is being conducted through[employer] Embedded and patient was informed this is a secure, HIPAA-complaint platform  She agrees to proceed     My office door was closed  No one else was in the room  She acknowledged consent and understanding of privacy and security of the video platform  The patient has agreed to participate and understands they can discontinue the visit at any time  Patient is aware this is a billable service  Subjective  Rina Billings is a 31 y o  female with history of anxiety, depression   HPI   Radha was seen for follow-up of anxiety, depression, chronic PTSD and for medication management  Reports that she has been feeling fairly well over the past couple months  Anxiety has been manageable with medications  Taking Pristiq 150 mg total per day and CBG gummy in the morning which is helpful  Depressive symptoms have also been manageable  No suicidal or homicidal ideation  She has good interest and motivation  States that work has been busy but stable  She is sleeping better  States she has vivid dreams at times but they are not typically disruptive to her sleep  Trazodone is helpful p r n   States that she has been getting out for walks and is planning to start of more intense exercise program     Reports that her focus and concentration has also been improved over the past couple weeks  Feels as though she is able to focus for longer periods of time  Taking some time off in the beginning of September and looking forward to at  Physically she is recuperating from [facility]  No new medications or other medical issues noted  Past Medical History:   Diagnosis Date    Chronic sinus infection     Depression     Post-op pain        Past Surgical History:   Procedure Laterality Date    ESOPHAGOGASTRODUODENOSCOPY N/A 8/15/2018    Procedure: ESOPHAGOGASTRODUODENOSCOPY (EGD); Surgeon: Jair Reilly MD;  [facility];   Service: Gastroenterology    HAND SURGERY Right     KNEE ARTHROSCOPY Left     lateral meniscus tear, ACL tear    KNEE SURGERY Left     [place] REPAIR ACHILLES TENDON,PRIMARY Right 9/30/2019    Procedure: REPAIR TENDON ACHILLES;  Surgeon: Nathan Husain MD;  [facility];  Service: Orthopedics    UPPER GASTROINTESTINAL ENDOSCOPY         Current Outpatient Medications   Medication Sig Dispense Refill    desvenlafaxine (PRISTIQ) 100 mg 24 hr tablet Take 1 tablet (100 mg total) by mouth daily 90 tablet 1    desvenlafaxine succinate (PRISTIQ) 50 mg 24 hr tablet Take 1 tablet (50 mg total) by mouth daily 90 tablet 1    fexofenadine (ALLEGRA) 180 MG tablet Take 1 tablet (180 mg total) by mouth daily (Patient not taking: Reported on 5/24/2022)  0    hydrOXYzine HCL (ATARAX) 10 mg tablet Take 1 tablet (10 mg total) by mouth every 6 (six) hours as needed for anxiety (Patient not taking: Reported on 5/24/2022) 30 tablet 1    ibuprofen (MOTRIN) 800 mg tablet Take by mouth every 6 (six) hours as needed for mild pain (Patient not taking: Reported on 5/24/2022)      levonorgestrel (MIRENA) 20 MCG/24HR IUD 1 Intra Uterine Device by Intrauterine route once for 1 dose (Patient taking differently: 1 each by Intrauterine route once ) 1 each 0    mometasone (NASONEX) 50 mcg/act nasal spray 2 sprays into each nostril daily (Patient not taking: Reported on 5/24/2022) 17 g 5    traZODone (DESYREL) 50 mg tablet Take 1 tablet (50 mg total) by mouth daily at bedtime as needed for sleep 90 tablet 1     No current facility-administered medications for this visit  No Known Allergies    Review of Systems  As noted in HPI  Video Exam    There were no vitals filed for this visit      Physical Exam   Mental status exam:  Speech is clear, coherent, normal rate volume   Adequate hygiene, good eye contact   Affect is euthymic, mood is congruent   Linear and coherent thought process, goal directed   No suicidal or homicidal ideation   No psychotic signs or symptoms noted, no hallucinations, no delusions were voiced   Cognition appears intact  Insight and judgment appears intact      Medications and treatment plan as follows:   Pristiq 150 mg total per day   Trazodone 50 mg at bedtime p r n     Hydroxyzine 10 mg daily p r n , has not been taking   Will follow-up in two months and she will call me sooner if any questions or concerns  Overall currently doing well and at her baseline      I spent 18 minutes directly with the patient during this visit and additional time reviewing chart, medications

## 2022-10-21 ENCOUNTER — OFFICE VISIT (OUTPATIENT)
Dept: FAMILY MEDICINE CLINIC | Facility: CLINIC | Age: 37
End: 2022-10-21
Payer: COMMERCIAL

## 2022-10-21 VITALS
HEIGHT: 69 IN | RESPIRATION RATE: 16 BRPM | BODY MASS INDEX: 36.29 KG/M2 | SYSTOLIC BLOOD PRESSURE: 122 MMHG | HEART RATE: 90 BPM | TEMPERATURE: 98.2 F | WEIGHT: 245 LBS | DIASTOLIC BLOOD PRESSURE: 88 MMHG

## 2022-10-21 DIAGNOSIS — R39.9 URINARY SYMPTOM OR SIGN: Primary | ICD-10-CM

## 2022-10-21 DIAGNOSIS — Z20.822 SUSPECTED COVID-19 VIRUS INFECTION: ICD-10-CM

## 2022-10-21 DIAGNOSIS — R68.83 CHILLS: ICD-10-CM

## 2022-10-21 LAB
AMORPH URATE CRY URNS QL MICRO: NORMAL
BACTERIA UR QL AUTO: NORMAL /HPF
BILIRUB UR QL STRIP: NEGATIVE
CLARITY UR: ABNORMAL
COLOR UR: YELLOW
GLUCOSE UR STRIP-MCNC: NEGATIVE MG/DL
HGB UR QL STRIP.AUTO: NEGATIVE
KETONES UR STRIP-MCNC: ABNORMAL MG/DL
LEUKOCYTE ESTERASE UR QL STRIP: ABNORMAL
NITRITE UR QL STRIP: NEGATIVE
NON-SQ EPI CELLS URNS QL MICRO: NORMAL /HPF
PH UR STRIP.AUTO: 5.5 [PH]
PROT UR STRIP-MCNC: ABNORMAL MG/DL
RBC #/AREA URNS AUTO: NORMAL /HPF
SL AMB  POCT GLUCOSE, UA: NORMAL
SL AMB LEUKOCYTE ESTERASE,UA: NORMAL
SL AMB POCT BILIRUBIN,UA: NORMAL
SL AMB POCT BLOOD,UA: NORMAL
SL AMB POCT CLARITY,UA: CLEAR
SL AMB POCT COLOR,UA: YELLOW
SL AMB POCT KETONES,UA: NORMAL
SL AMB POCT NITRITE,UA: NORMAL
SL AMB POCT PH,UA: 5
SL AMB POCT SPECIFIC GRAVITY,UA: 1.02
SL AMB POCT URINE PROTEIN: NORMAL
SL AMB POCT UROBILINOGEN: 0.2
SP GR UR STRIP.AUTO: 1.03 (ref 1–1.03)
UROBILINOGEN UR STRIP-ACNC: <2 MG/DL
WBC #/AREA URNS AUTO: NORMAL /HPF

## 2022-10-21 PROCEDURE — 81002 URINALYSIS NONAUTO W/O SCOPE: CPT | Performed by: NURSE PRACTITIONER

## 2022-10-21 PROCEDURE — 87086 URINE CULTURE/COLONY COUNT: CPT | Performed by: NURSE PRACTITIONER

## 2022-10-21 PROCEDURE — U0003 INFECTIOUS AGENT DETECTION BY NUCLEIC ACID (DNA OR RNA); SEVERE ACUTE RESPIRATORY SYNDROME CORONAVIRUS 2 (SARS-COV-2) (CORONAVIRUS DISEASE [COVID-19]), AMPLIFIED PROBE TECHNIQUE, MAKING USE OF HIGH THROUGHPUT TECHNOLOGIES AS DESCRIBED BY CMS-2020-01-R: HCPCS | Performed by: NURSE PRACTITIONER

## 2022-10-21 PROCEDURE — 99213 OFFICE O/P EST LOW 20 MIN: CPT | Performed by: NURSE PRACTITIONER

## 2022-10-21 PROCEDURE — 81001 URINALYSIS AUTO W/SCOPE: CPT | Performed by: NURSE PRACTITIONER

## 2022-10-21 PROCEDURE — U0005 INFEC AGEN DETEC AMPLI PROBE: HCPCS | Performed by: NURSE PRACTITIONER

## 2022-10-21 RX ORDER — CETIRIZINE HYDROCHLORIDE 10 MG/1
10 TABLET ORAL DAILY
COMMUNITY

## 2022-10-21 NOTE — PROGRESS NOTES
Assessment/Plan:     Diagnoses and all orders for this visit:    Urinary symptom or sign  -     POCT urine dip  -     Urine culture  -     UA (URINE) with reflex to Scope    Urine dip negative  Pt encouraged that if she did have UTI when first starting antibiotic, it may take 48-72 hours taking antibiotic for symptom improvement  Will send for urine culture to be thorough  Pt to take antibiotic as prescribed  Patient encouraged to increase oral fluid intake of water to help flush system  They may utilize OTC Azo or Uristat to help with urinary symptom relief  Incorporating cranberry supplementation can also help aid in treatment plan  Pt to call office if they develop any fevers, chills, CVA tenderness, or any worsening of symptoms  Pt notified that we will contact them if any alterations or changes need to be made to treatment plan based on urine culture final results  Suspected COVID-19 virus infection  -     COVID Only- Office Collect        F/u PRN  F/u pending results  Subjective:      Patient ID: Daniel Juan is a 30 y o  female  Pt presents today for dysuria, urinary urgency, occasional nausea since Monday  Pt denies urinary frequency, V/D, CVA tenderness, hematuria  She notes that on Wednesday she also started headaches, hot flashes, chills, fatigue  She has been on Macrobid for 1 5 days prescribed by a friend who is in family medicine  She has not noticed much difference in symptoms since starting antibiotic  Pt is a surgical resident at [facility] and notes she does not urinate as much as she should throughout the day  The following portions of the patient's history were reviewed and updated as appropriate: allergies, current medications, past family history, past medical history, past social history, past surgical history and problem list     Review of Systems    As noted per HPI      Objective:      /88   Pulse 90   Resp 16   Ht 5' 9 25" (1 759 m)   Wt 111 kg (245 lb)   BMI 35 92 kg/m²          Physical Exam  Vitals reviewed  Constitutional:       General: She is not in acute distress  Appearance: Normal appearance  She is not ill-appearing  Cardiovascular:      Rate and Rhythm: Normal rate and regular rhythm  Pulses: Normal pulses  Heart sounds: Normal heart sounds  No murmur heard  Pulmonary:      Effort: Pulmonary effort is normal  No respiratory distress  Breath sounds: Normal breath sounds  No wheezing  Abdominal:      General: There is no distension  Palpations: There is no mass  Tenderness: There is no abdominal tenderness  There is no right CVA tenderness, left CVA tenderness or guarding  Hernia: No hernia is present  Neurological:      Mental Status: She is alert and oriented to person, place, and time  Mental status is at baseline  Psychiatric:         Mood and Affect: Mood normal          Behavior: Behavior normal          Thought Content:  Thought content normal          Judgment: Judgment normal

## 2022-10-22 LAB — SARS-COV-2 RNA RESP QL NAA+PROBE: NEGATIVE

## 2022-10-23 LAB
BACTERIA UR CULT: ABNORMAL
BACTERIA UR CULT: ABNORMAL

## 2022-10-24 ENCOUNTER — APPOINTMENT (OUTPATIENT)
Dept: LAB | Facility: CLINIC | Age: 37
End: 2022-10-24
Payer: COMMERCIAL

## 2022-10-24 ENCOUNTER — TELEPHONE (OUTPATIENT)
Dept: FAMILY MEDICINE CLINIC | Facility: CLINIC | Age: 37
End: 2022-10-24

## 2022-10-24 DIAGNOSIS — Z11.3 SCREENING FOR STD (SEXUALLY TRANSMITTED DISEASE): ICD-10-CM

## 2022-10-24 DIAGNOSIS — Z11.3 SCREENING FOR STD (SEXUALLY TRANSMITTED DISEASE): Primary | ICD-10-CM

## 2022-10-24 DIAGNOSIS — R59.0 INGUINAL LYMPHADENOPATHY: ICD-10-CM

## 2022-10-24 LAB
ALBUMIN SERPL BCP-MCNC: 3.4 G/DL (ref 3.5–5)
ALP SERPL-CCNC: 78 U/L (ref 46–116)
ALT SERPL W P-5'-P-CCNC: 21 U/L (ref 12–78)
ANION GAP SERPL CALCULATED.3IONS-SCNC: 7 MMOL/L (ref 4–13)
AST SERPL W P-5'-P-CCNC: 17 U/L (ref 5–45)
BASOPHILS # BLD MANUAL: 0 THOUSAND/UL (ref 0–0.1)
BASOPHILS NFR MAR MANUAL: 0 % (ref 0–1)
BILIRUB SERPL-MCNC: 0.18 MG/DL (ref 0.2–1)
BUN SERPL-MCNC: 10 MG/DL (ref 5–25)
CALCIUM ALBUM COR SERPL-MCNC: 9.7 MG/DL (ref 8.3–10.1)
CALCIUM SERPL-MCNC: 9.2 MG/DL (ref 8.3–10.1)
CHLORIDE SERPL-SCNC: 104 MMOL/L (ref 96–108)
CO2 SERPL-SCNC: 24 MMOL/L (ref 21–32)
CREAT SERPL-MCNC: 0.92 MG/DL (ref 0.6–1.3)
EOSINOPHIL # BLD MANUAL: 0.26 THOUSAND/UL (ref 0–0.4)
EOSINOPHIL NFR BLD MANUAL: 3 % (ref 0–6)
ERYTHROCYTE [DISTWIDTH] IN BLOOD BY AUTOMATED COUNT: 14.1 % (ref 11.6–15.1)
GFR SERPL CREATININE-BSD FRML MDRD: 80 ML/MIN/1.73SQ M
GLUCOSE SERPL-MCNC: 100 MG/DL (ref 65–140)
HCT VFR BLD AUTO: 38.6 % (ref 34.8–46.1)
HGB BLD-MCNC: 12.5 G/DL (ref 11.5–15.4)
LYMPHOCYTES # BLD AUTO: 3.29 THOUSAND/UL (ref 0.6–4.47)
LYMPHOCYTES # BLD AUTO: 38 % (ref 14–44)
MCH RBC QN AUTO: 26.8 PG (ref 26.8–34.3)
MCHC RBC AUTO-ENTMCNC: 32.4 G/DL (ref 31.4–37.4)
MCV RBC AUTO: 83 FL (ref 82–98)
MONOCYTES # BLD AUTO: 0.87 THOUSAND/UL (ref 0–1.22)
MONOCYTES NFR BLD: 10 % (ref 4–12)
NEUTROPHILS # BLD MANUAL: 3.81 THOUSAND/UL (ref 1.85–7.62)
NEUTS BAND NFR BLD MANUAL: 1 % (ref 0–8)
NEUTS SEG NFR BLD AUTO: 43 % (ref 43–75)
PLATELET # BLD AUTO: 353 THOUSANDS/UL (ref 149–390)
PLATELET BLD QL SMEAR: ADEQUATE
PMV BLD AUTO: 10.3 FL (ref 8.9–12.7)
POLYCHROMASIA BLD QL SMEAR: PRESENT
POTASSIUM SERPL-SCNC: 3.8 MMOL/L (ref 3.5–5.3)
PROT SERPL-MCNC: 8 G/DL (ref 6.4–8.4)
RBC # BLD AUTO: 4.66 MILLION/UL (ref 3.81–5.12)
RBC MORPH BLD: NORMAL
SMUDGE CELLS BLD QL SMEAR: PRESENT
SODIUM SERPL-SCNC: 135 MMOL/L (ref 135–147)
VARIANT LYMPHS # BLD AUTO: 5 %
WBC # BLD AUTO: 8.67 THOUSAND/UL (ref 4.31–10.16)

## 2022-10-24 PROCEDURE — 86592 SYPHILIS TEST NON-TREP QUAL: CPT

## 2022-10-24 PROCEDURE — 36415 COLL VENOUS BLD VENIPUNCTURE: CPT

## 2022-10-24 PROCEDURE — 87591 N.GONORRHOEAE DNA AMP PROB: CPT

## 2022-10-24 PROCEDURE — 85027 COMPLETE CBC AUTOMATED: CPT

## 2022-10-24 PROCEDURE — 87529 HSV DNA AMP PROBE: CPT

## 2022-10-24 PROCEDURE — 87491 CHLMYD TRACH DNA AMP PROBE: CPT

## 2022-10-24 PROCEDURE — 85007 BL SMEAR W/DIFF WBC COUNT: CPT

## 2022-10-24 PROCEDURE — 80053 COMPREHEN METABOLIC PANEL: CPT

## 2022-10-24 PROCEDURE — 87536 HIV-1 QUANT&REVRSE TRNSCRPJ: CPT

## 2022-10-24 NOTE — TELEPHONE ENCOUNTER
Lucio Kain called and wanted to make another appointment with you today  Says she doesn't feel any better with the UTI and says she actually still thinks she has a fever  I advised that you didn't have anything left today and she wanted to schedule for 8:30 tomorrow morning and send a message to you also about this

## 2022-10-25 LAB
C TRACH DNA SPEC QL NAA+PROBE: NEGATIVE
HIV1 RNA # SERPL NAA+PROBE: <20 COPIES/ML
HIV1 RNA SERPL NAA+PROBE-LOG#: NORMAL LOG10COPY/ML
N GONORRHOEA DNA SPEC QL NAA+PROBE: NEGATIVE
RPR SER QL: NORMAL

## 2022-10-30 LAB
HSV1 DNA SPEC QL NAA+PROBE: NEGATIVE
HSV2 DNA SPEC QL NAA+PROBE: NEGATIVE

## 2022-11-11 ENCOUNTER — TELEMEDICINE (OUTPATIENT)
Dept: PSYCHIATRY | Facility: CLINIC | Age: 37
End: 2022-11-11

## 2022-11-11 DIAGNOSIS — F41.1 GENERALIZED ANXIETY DISORDER WITH PANIC ATTACKS: ICD-10-CM

## 2022-11-11 DIAGNOSIS — F43.12 CHRONIC POST-TRAUMATIC STRESS DISORDER (PTSD): ICD-10-CM

## 2022-11-11 DIAGNOSIS — F33.1 MODERATE EPISODE OF RECURRENT MAJOR DEPRESSIVE DISORDER (HCC): Primary | ICD-10-CM

## 2022-11-11 DIAGNOSIS — F41.0 GENERALIZED ANXIETY DISORDER WITH PANIC ATTACKS: ICD-10-CM

## 2022-11-11 NOTE — PSYCH
This note was not shared with the patient due to patient requested    Virtual Regular Visit    Verification of patient location:    Patient is located in the following state in which I hold an active license [place]      Assessment/Plan:    Problem List Items Addressed This Visit    None     Visit Diagnoses     Moderate episode of recurrent major depressive disorder ([address] )    -  Primary    Generalized anxiety disorder with panic attacks        Chronic post-traumatic stress disorder (PTSD)              Goals addressed in session: Goal 1          Reason for visit is   Chief Complaint   Patient presents with   • Medication Management   • Follow-up   • Virtual Regular Visit        Encounter provider Eros Arita PA-C    Provider located at [address] [address]      Recent Visits  No visits were found meeting these conditions  Showing recent visits within past 7 days and meeting all other requirements  Today's Visits  Date Type Provider Dept   11/11/22 [address] [place] [address],  [address], [facility] [place]   Showing today's visits and meeting all other requirements  Future Appointments  No visits were found meeting these conditions  Showing future appointments within next 150 days and meeting all other requirements       The patient was identified by name and date of birth  Berhane Keane was informed that this is a telemedicine visit and that the visit is being conducted throughthe [facility]  She agrees to proceed     My office door was closed  No one else was in the room  She acknowledged consent and understanding of privacy and security of the video platform  The patient has agreed to participate and understands they can discontinue the visit at any time  Patient is aware this is a billable service       Subjective  Berhane Keane is a 30 y o  female with history of depression and anxiety Mishel WalkerCarolyn TRISTIN Garcia was seen for follow-up of depression, anxiety and for medication management  She notes that she had a couple weeks in September that she was feeling more down and depressed  No suicidal or homicidal ideation  States that she gradually start to feel better  She has been continuing to see her therapist on a regular basis  Has also started doing yoga to get back into some physical activity  States that this week she has been having some more anxiety related to work stressors  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  Has been dealing with some challenging personalities which is stressful  Overall she appears to have a positive attitude and has been handling things  Reports that she has been sleeping well  States that since having COVID in August she has been sleeping very well and has not needed p r n  trazodone  She has continue to take Pristiq as prescribed  States that she took p r n  hydroxyzine twice this week for anxiety and panic which was slightly helpful  Physically she is currently doing well and no new medications are medical issues noted  Past Medical History:   Diagnosis Date   • Chronic sinus infection    • Depression    • Post-op pain        Past Surgical History:   Procedure Laterality Date   • ESOPHAGOGASTRODUODENOSCOPY N/A 8/15/2018    Procedure: ESOPHAGOGASTRODUODENOSCOPY (EGD); Surgeon: Zachary Mckeon MD;  Location[facility];   Service: Gastroenterology   • HAND SURGERY Right    • KNEE ARTHROSCOPY Left     lateral meniscus tear, ACL tear   • KNEE SURGERY Left    • [place] REPAIR ACHILLES TENDON,PRIMARY Right 9/30/2019    Procedure: REPAIR TENDON ACHILLES;  Surgeon: Delaney Lin MD;  [facility];  Service: Orthopedics   • UPPER GASTROINTESTINAL ENDOSCOPY         Current Outpatient Medications   Medication Sig Dispense Refill   • cetirizine (ZyrTEC) 10 mg tablet Take 10 mg by mouth daily     • desvenlafaxine (PRISTIQ) 100 mg 24 hr tablet Take 1 tablet (100 mg total) by mouth daily 90 tablet 1   • desvenlafaxine succinate (PRISTIQ) 50 mg 24 hr tablet Take 1 tablet (50 mg total) by mouth daily 90 tablet 1   • hydrOXYzine HCL (ATARAX) 10 mg tablet Take 1 tablet (10 mg total) by mouth every 6 (six) hours as needed for anxiety 30 tablet 1   • ibuprofen (MOTRIN) 800 mg tablet Take by mouth every 6 (six) hours as needed for mild pain     • levonorgestrel (MIRENA) 20 MCG/24HR IUD 1 Intra Uterine Device by Intrauterine route once for 1 dose (Patient taking differently: 1 each by Intrauterine route once ) 1 each 0   • traZODone (DESYREL) 50 mg tablet Take 1 tablet (50 mg total) by mouth daily at bedtime as needed for sleep 90 tablet 1     No current facility-administered medications for this visit  No Known Allergies    Review of Systems  As noted in HPI  Video Exam    There were no vitals filed for this visit      Physical Exam   Mental status exam:   Speech is clear, coherent, normal rate and volume   Good hygiene, good eye contact   Affect is currently appropriate, mood is euthymic  Linear and coherent thought process   No suicidal or homicidal ideation   No psychotic signs or symptoms, no hallucinations no delusions voiced   Cognition appears intact  Insight judgment intact     Medications treatment as follows:   Pristiq 150 milligrams total per day   Trazodone 50 milligrams p r n  at bedtime, has not been eating   Hydroxyzine 10 milligrams p r n  for anxiety, panic  Will follow-up in three months she will call me sooner if any questions or concerns  Visit Time    Visit Start Time: 930  Visit Stop Time: 957  Total Visit Duration: 27 minutes

## 2022-11-11 NOTE — BH TREATMENT PLAN
TREATMENT PLAN (Medication Management Only)        [facility]    Name and Date of Birth:  Yevgeniy Tomlin 30 y o  1985  Date of Treatment Plan: November 11, 2022  Diagnosis/Diagnoses:    1  Moderate episode of recurrent major depressive disorder ([address] )    2  Generalized anxiety disorder with panic attacks    3  Chronic post-traumatic stress disorder (PTSD)      Strengths/Personal Resources for Self-Care: supportive family, supportive friends, taking medications as prescribed  Area/Areas of need (in own words): anxiety symptoms  1  Long Term Goal: continue improvement in anxiety  Target Date:3 months - 2/11/2023  Person/Persons responsible for completion of goal: Radha  2  Short Term Objective (s) - How will we reach this goal?:   A  Provider new recommended medication/dosage changes and/or continue medication(s): continue current medications as prescribed  B  N/A   C  N/A  Target Date:3 months - 2/11/2023  Person/Persons Responsible for Completion of Goal: Radha  Progress Towards Goals: stable  Treatment Modality: medication management every 3 months  Review due 180 days from date of this plan: 6 months - 5/11/2023  Expected length of service: ongoing treatment  My Physician/PA/NP and I have developed this plan together and I agree to work on the goals and objectives  I understand the treatment goals that were developed for my treatment

## 2022-11-30 ENCOUNTER — HOSPITAL ENCOUNTER (EMERGENCY)
Facility: HOSPITAL | Age: 37
Discharge: HOME | End: 2022-11-30
Attending: EMERGENCY MEDICINE

## 2022-11-30 VITALS
HEIGHT: 69 IN | DIASTOLIC BLOOD PRESSURE: 96 MMHG | BODY MASS INDEX: 35.55 KG/M2 | OXYGEN SATURATION: 98 % | WEIGHT: 240 LBS | TEMPERATURE: 98.4 F | HEART RATE: 103 BPM | SYSTOLIC BLOOD PRESSURE: 147 MMHG | RESPIRATION RATE: 16 BRPM

## 2022-11-30 DIAGNOSIS — Z57.8 EMPLOYEE EXPOSURE TO BLOOD: Primary | ICD-10-CM

## 2022-11-30 LAB — ALT SERPL W P-5'-P-CCNC: 16 U/L (ref 12–78)

## 2022-11-30 RX ADMIN — TETANUS TOXOID, REDUCED DIPHTHERIA TOXOID AND ACELLULAR PERTUSSIS VACCINE, ADSORBED 0.5 ML: 5; 2.5; 8; 8; 2.5 SUSPENSION INTRAMUSCULAR at 16:24

## 2022-11-30 SDOH — HEALTH STABILITY - PHYSICAL HEALTH: OCCUPATIONAL EXPOSURE TO OTHER RISK FACTORS: Z57.8

## 2022-11-30 NOTE — ED PROVIDER NOTES
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 Taking? cetirizine (ZyrTEC) 10 mg tablet   Yes No   Sig: Take 10 mg by mouth daily   desvenlafaxine (PRISTIQ) 100 mg 24 hr tablet   No No   Sig: Take 1 tablet (100 mg total) by mouth daily   desvenlafaxine succinate (PRISTIQ) 50 mg 24 hr tablet   No No   Sig: Take 1 tablet (50 mg total) by mouth daily   hydrOXYzine HCL (ATARAX) 10 mg tablet   No No   Sig: Take 1 tablet (10 mg total) by mouth every 6 (six) hours as needed for anxiety   ibuprofen (MOTRIN) 800 mg tablet   Yes No   Sig: Take by mouth every 6 (six) hours as needed for mild pain   levonorgestrel (MIRENA) 20 MCG/24HR IUD   No No   Si Intra Uterine Device by Intrauterine route once for 1 dose   Patient taking differently: 1 each by Intrauterine route once    traZODone (DESYREL) 50 mg tablet   No No   Sig: Take 1 tablet (50 mg total) by mouth daily at bedtime as needed for sleep      Facility-Administered Medications: None       Past Medical History:   Diagnosis Date   • Chronic sinus infection    • Depression    • Post-op pain        Past Surgical History:   Procedure Laterality Date   • ESOPHAGOGASTRODUODENOSCOPY N/A 8/15/2018    Procedure: ESOPHAGOGASTRODUODENOSCOPY (EGD); Surgeon: Byron Baum MD;  Location[facility];   Service: Gastroenterology   • HAND SURGERY Right    • KNEE ARTHROSCOPY Left     lateral meniscus tear, ACL tear   • KNEE SURGERY Left    • [place] REPAIR ACHILLES TENDON,PRIMARY Right 9/30/2019    Procedure: REPAIR TENDON ACHILLES;  Surgeon: Alessia Maurice MD;  [facility];  Service: Orthopedics   • UPPER GASTROINTESTINAL ENDOSCOPY         Family History   Problem Relation Age of Onset   • Depression Mother    • Hypertension Mother    • Diabetes Father    • Hypertension Father    • Cancer Father    • Heart failure Father    • Leukemia Father    • No Known Problems Sister    • No Known Problems Brother    • Diabetes Paternal Grandmother    • Hypertension Paternal Grandmother    • Alcohol abuse Maternal Uncle    • Depression Maternal Uncle    • Mental illness Maternal Uncle    • Alcohol abuse Cousin    • Depression Cousin    • Mental illness Cousin      I have reviewed and agree with the history as documented  E-Cigarette/Vaping   • E-Cigarette Use Never User      E-Cigarette/Vaping Substances     Social History     Tobacco Use   • Smoking status: Never   • Smokeless tobacco: Never   Vaping Use   • Vaping Use: Never used   Substance Use Topics   • Alcohol use: Not Currently     Comment: social   • Drug use: No       Review of Systems   All other systems reviewed and are negative  Physical Exam  Physical Exam  Constitutional:       Appearance: Normal appearance  HENT:      Head: Normocephalic and atraumatic  Right Ear: External ear normal       Left Ear: External ear normal       Nose: Nose normal       Mouth/Throat:      Lips: Pink  Mouth: Mucous membranes are moist    Eyes:      Extraocular Movements: Extraocular movements intact  Conjunctiva/sclera: Conjunctivae normal    Pulmonary:      Effort: No tachypnea or respiratory distress  Musculoskeletal:      Cervical back: Normal range of motion and neck supple  Skin:     General: Skin is warm        Capillary Refill: Capillary refill takes less than 2 seconds  Neurological:      Mental Status: She is alert and oriented to person, place, and time  GCS: GCS eye subscore is 4  GCS verbal subscore is 5  GCS motor subscore is 6  Psychiatric:         Mood and Affect: Mood and affect normal          Speech: Speech normal          Vital Signs  ED Triage Vitals [11/30/22 1558]   Temperature Pulse Respirations Blood Pressure SpO2   98 4 °F (36 9 °C) 103 16 147/96 98 %      Temp Source Heart Rate Source Patient Position - Orthostatic VS BP Location FiO2 (%)   Oral Monitor Sitting Right arm --      Pain Score       --           Vitals:    11/30/22 1558   BP: 147/96   Pulse: 103   Patient Position - Orthostatic VS: Sitting         Visual Acuity      ED Medications  Medications   tetanus-diphtheria-acellular pertussis (BOOSTRIX) IM injection 0 5 mL (0 5 mL Intramuscular Given 11/30/22 1624)       Diagnostic Studies  Results Reviewed     Procedure Component Value Units Date/Time    ALT [[id number]]  (Normal) Collected: 11/30/22 1614    Lab Status: Final result Specimen: Blood from Arm, Left Updated: 11/30/22 1746     ALT 16 U/L     Hepatitis B surface antigen [[id number]] Collected: 11/30/22 1614    Lab Status: In process Specimen: Blood from Arm, Left Updated: 11/30/22 1731    Hepatitis C antibody [[id number]] Collected: 11/30/22 1614    Lab Status: In process Specimen: Blood from Arm, Left Updated: 11/30/22 1731    Hepatitis B surface antibody [[id number]] Collected: 11/30/22 1614    Lab Status: In process Specimen: Blood from Arm, Left Updated: 11/30/22 1731    HIV 1/2 Antigen/Antibody (4th Generation) w Reflex [facility] [[id number]] Collected: 11/30/22 1614    Lab Status:  In process Specimen: Blood from Arm, Left Updated: 11/30/22 1731                 No orders to display              Procedures  Procedures         ED Course                                             MDM  Number of Diagnoses or Management Options  Employee exposure to blood  Diagnosis management comments: Patient is a 47-year-old female presenting to the emergency department for evaluation of body fluid exposure  Exposure panel obtained  Paperwork completed  Follow-up with [facility] employee health in 72 hours    Patient verbalizes understanding and agrees with plan  The management plan was discussed in detail with the patient at bedside and all questions were answered  Prior to discharge, I provided both verbal and written instructions  I discussed with the patient the signs and symptoms for which to return to the emergency department  All questions were answered and patient was comfortable with the plan of care and discharged to home  The patient agrees to return to the Emergency Department for concerns and/or progression of illness  Disposition  Final diagnoses:   Employee exposure to blood     Time reflects when diagnosis was documented in both MDM as applicable and the Disposition within this note     Time User Action Codes Description Comment    11/30/2022  4:25 PM Danyell Landers Add [Z57 8] Employee exposure to blood       ED Disposition     ED Disposition   Discharge    Condition   Stable    Date/Time   Wed Nov 30, 2022  4:25 PM    Comment   Berhane Keane discharge to home/self care                 Follow-up Information     Follow up With Specialties Details [place] [address] [facility] 72 hours to review baseline results [address] [place] [address]  [phone]          Discharge Medication List as of 11/30/2022  4:26 PM      CONTINUE these medications which have NOT CHANGED    Details   cetirizine (ZyrTEC) 10 mg tablet Take 10 mg by mouth daily, Historical Med      !! desvenlafaxine (PRISTIQ) 100 mg 24 hr tablet Take 1 tablet (100 mg total) by mouth daily, Starting Fri 8/19/2022, Normal      !! desvenlafaxine succinate (PRISTIQ) 50 mg 24 hr tablet Take 1 tablet (50 mg total) by mouth daily, Starting Fri 8/19/2022, Normal      hydrOXYzine HCL (ATARAX) 10 mg tablet Take 1 tablet (10 mg total) by mouth every 6 (six) hours as needed for anxiety, Starting Fri 4/8/2022, Normal      ibuprofen (MOTRIN) 800 mg tablet Take by mouth every 6 (six) hours as needed for mild pain, Historical Med      levonorgestrel (MIRENA) 20 MCG/24HR IUD 1 Intra Uterine Device by Intrauterine route once for 1 dose, Starting Wed 2/20/2019, No Print      traZODone (DESYREL) 50 mg tablet Take 1 tablet (50 mg total) by mouth daily at bedtime as needed for sleep, Starting Fri 6/24/2022, Normal       !! - Potential duplicate medications found  Please discuss with provider  No discharge procedures on file      PDMP Review       Value Time User    PDMP Reviewed  Yes 10/8/2019  1:37 PM Santiago Cardenas PA-C          ED Provider  Electronically Signed by           Ivon Mendez PA-C  11/30/22 4546

## 2022-12-01 LAB
HBV SURFACE AB SER-ACNC: 298.64 MIU/ML
HBV SURFACE AG SER QL: NORMAL
HCV AB SER QL: NORMAL
HIV 1+2 AB+HIV1 P24 AG SERPL QL IA: NORMAL

## 2023-02-09 DIAGNOSIS — Z01.84 IMMUNITY TO HEPATITIS B VIRUS DEMONSTRATED BY SEROLOGIC TEST: Primary | ICD-10-CM

## 2023-02-09 DIAGNOSIS — Z11.1 SCREENING-PULMONARY TB: Primary | ICD-10-CM

## 2023-02-13 ENCOUNTER — APPOINTMENT (OUTPATIENT)
Dept: LAB | Facility: CLINIC | Age: 38
End: 2023-02-13

## 2023-02-13 DIAGNOSIS — Z11.1 SCREENING-PULMONARY TB: ICD-10-CM

## 2023-02-13 DIAGNOSIS — Z01.84 IMMUNITY TO HEPATITIS B VIRUS DEMONSTRATED BY SEROLOGIC TEST: ICD-10-CM

## 2023-02-13 LAB — HBV SURFACE AB SER-ACNC: 247.63 MIU/ML

## 2023-02-15 LAB
GAMMA INTERFERON BACKGROUND BLD IA-ACNC: 0.03 IU/ML
M TB IFN-G BLD-IMP: NEGATIVE
M TB IFN-G CD4+ BCKGRND COR BLD-ACNC: 0 IU/ML
M TB IFN-G CD4+ BCKGRND COR BLD-ACNC: 0 IU/ML
MITOGEN IGNF BCKGRD COR BLD-ACNC: 4.23 IU/ML

## 2023-03-06 DIAGNOSIS — F41.0 GENERALIZED ANXIETY DISORDER WITH PANIC ATTACKS: ICD-10-CM

## 2023-03-06 DIAGNOSIS — R41.840 ATTENTION AND CONCENTRATION DEFICIT: ICD-10-CM

## 2023-03-06 DIAGNOSIS — F43.12 CHRONIC POST-TRAUMATIC STRESS DISORDER (PTSD): ICD-10-CM

## 2023-03-06 DIAGNOSIS — F41.1 GENERALIZED ANXIETY DISORDER WITH PANIC ATTACKS: ICD-10-CM

## 2023-03-06 DIAGNOSIS — F33.1 MODERATE EPISODE OF RECURRENT MAJOR DEPRESSIVE DISORDER (HCC): ICD-10-CM

## 2023-03-06 RX ORDER — DESVENLAFAXINE 100 MG/1
TABLET, EXTENDED RELEASE ORAL
Qty: 90 TABLET | Refills: 0 | Status: SHIPPED | OUTPATIENT
Start: 2023-03-06

## 2023-03-06 RX ORDER — DESVENLAFAXINE 50 MG/1
TABLET, EXTENDED RELEASE ORAL
Qty: 90 TABLET | Refills: 0 | Status: SHIPPED | OUTPATIENT
Start: 2023-03-06

## 2023-05-05 ENCOUNTER — OFFICE VISIT (OUTPATIENT)
Dept: FAMILY MEDICINE CLINIC | Facility: CLINIC | Age: 38
End: 2023-05-05

## 2023-05-05 VITALS
HEIGHT: 69 IN | DIASTOLIC BLOOD PRESSURE: 80 MMHG | TEMPERATURE: 99.1 F | BODY MASS INDEX: 36.38 KG/M2 | HEART RATE: 86 BPM | WEIGHT: 245.6 LBS | RESPIRATION RATE: 16 BRPM | SYSTOLIC BLOOD PRESSURE: 122 MMHG

## 2023-05-05 DIAGNOSIS — J02.9 SORE THROAT: Primary | ICD-10-CM

## 2023-05-05 DIAGNOSIS — J02.0 STREP PHARYNGITIS: ICD-10-CM

## 2023-05-05 LAB — S PYO AG THROAT QL: POSITIVE

## 2023-05-05 RX ORDER — AMOXICILLIN 875 MG/1
875 TABLET, COATED ORAL 2 TIMES DAILY
Qty: 20 TABLET | Refills: 0 | Status: SHIPPED | OUTPATIENT
Start: 2023-05-05 | End: 2023-05-15

## 2023-05-05 NOTE — PROGRESS NOTES
Assessment/Plan:    1  Strep pharyngitis - t/c positive, will start amoxil 1 tab twice daily for 10 days, fluids, rest, ibuprofen/tylenol as needed    F/u as needed    Subjective:   Chief Complaint   Patient presents with    Fever    Sore Throat     Times 1 day    Headache    Nasal Congestion    Fatigue    Cough     Times 2-3 weeks       Patient ID: Sohail Zhang is a 30 y o  female  Patient with coughing and congestion for a couple days then yesterday sore throat, fatigued, headache  Denies sick contacts  Tried taking advil/tylenol  White mucus  The following portions of the patient's history were reviewed and updated as appropriate: allergies, current medications, past family history, past medical history, past social history, past surgical history and problem list     Past Medical History:   Diagnosis Date    Chronic sinus infection     Depression     Post-op pain      Past Surgical History:   Procedure Laterality Date    ESOPHAGOGASTRODUODENOSCOPY N/A 8/15/2018    Procedure: ESOPHAGOGASTRODUODENOSCOPY (EGD); Surgeon: Amari Kevin MD;  [facility];   Service: Gastroenterology    HAND SURGERY Right     KNEE ARTHROSCOPY Left     lateral meniscus tear, ACL tear    KNEE SURGERY Left     [place] REPAIR PRIMARY OPEN/PRQ RUPTURED ACHILLES TENDON Right 9/30/2019    Procedure: REPAIR TENDON ACHILLES;  Surgeon: Adina Berger MD;  [facility];  Service: Orthopedics    UPPER GASTROINTESTINAL ENDOSCOPY       Family History   Problem Relation Age of Onset    Depression Mother     Hypertension Mother     Diabetes Father     Hypertension Father     Cancer Father     Heart failure Father     Leukemia Father     No Known Problems Sister     No Known Problems Brother     Diabetes Paternal Grandmother     Hypertension Paternal Grandmother     Alcohol abuse Maternal Uncle     Depression Maternal Uncle     Mental illness Maternal Uncle     Alcohol abuse Cousin     Depression Cousin  Mental illness Cousin      Social History     Socioeconomic History    Marital status: Single     Spouse name: Not on file    Number of children: Not on file    Years of education: Not on file    Highest education level: Not on file   Occupational History    Not on file   Tobacco Use    Smoking status: Never    Smokeless tobacco: Never   Vaping Use    Vaping Use: Never used   Substance and Sexual Activity    Alcohol use: Not Currently     Comment: social    Drug use: No    Sexual activity: Yes     Partners: Male     Birth control/protection: I U D     Other Topics Concern    Not on file   Social History Narrative    Not on file     Social Determinants of Health     Financial Resource Strain: Not on file   Food Insecurity: Not on file   Transportation Needs: Not on file   Physical Activity: Not on file   Stress: Not on file   Social Connections: Not on file   Intimate Partner Violence: Not on file   Housing Stability: Not on file       Current Outpatient Medications:     cetirizine (ZyrTEC) 10 mg tablet, Take 10 mg by mouth daily, Disp: , Rfl:     desvenlafaxine (PRISTIQ) 100 mg 24 hr tablet, TAKE ONE TABLET BY MOUTH EVERY DAY, Disp: 90 tablet, Rfl: 0    desvenlafaxine succinate (PRISTIQ) 50 mg 24 hr tablet, TAKE ONE TABLET BY MOUTH EVERY DAY, Disp: 90 tablet, Rfl: 0    hydrOXYzine HCL (ATARAX) 10 mg tablet, Take 1 tablet (10 mg total) by mouth every 6 (six) hours as needed for anxiety, Disp: 30 tablet, Rfl: 1    ibuprofen (MOTRIN) 800 mg tablet, Take by mouth every 6 (six) hours as needed for mild pain, Disp: , Rfl:     traZODone (DESYREL) 50 mg tablet, Take 1 tablet (50 mg total) by mouth daily at bedtime as needed for sleep, Disp: 90 tablet, Rfl: 1    levonorgestrel (MIRENA) 20 MCG/24HR IUD, 1 Intra Uterine Device by Intrauterine route once for 1 dose (Patient taking differently: 1 each by Intrauterine route once ), Disp: 1 each, Rfl: 0    Review of Systems          Objective:    Vitals: "05/05/23 1211   BP: 122/80   Pulse: 86   Resp: 16   Temp: 99 1 °F (37 3 °C)   Weight: 111 kg (245 lb 9 6 oz)   Height: 5' 9\" (1 753 m)        Physical Exam  Constitutional:       Appearance: She is well-developed  HENT:      Head: Normocephalic and atraumatic  Right Ear: Tympanic membrane and ear canal normal       Left Ear: Tympanic membrane and ear canal normal       Nose: Congestion present  No rhinorrhea  Mouth/Throat:      Pharynx: Pharyngeal swelling and posterior oropharyngeal erythema present  No oropharyngeal exudate  Eyes:      Conjunctiva/sclera: Conjunctivae normal    Cardiovascular:      Rate and Rhythm: Normal rate and regular rhythm  Heart sounds: Normal heart sounds  Pulmonary:      Effort: Pulmonary effort is normal       Breath sounds: Normal breath sounds  Musculoskeletal:      Cervical back: Normal range of motion and neck supple  Neurological:      General: No focal deficit present  Mental Status: She is alert and oriented to person, place, and time     Psychiatric:         Mood and Affect: Mood normal          Behavior: Behavior normal                "

## 2023-09-14 DIAGNOSIS — F41.0 GENERALIZED ANXIETY DISORDER WITH PANIC ATTACKS: ICD-10-CM

## 2023-09-14 DIAGNOSIS — F43.12 CHRONIC POST-TRAUMATIC STRESS DISORDER (PTSD): ICD-10-CM

## 2023-09-14 DIAGNOSIS — R41.840 ATTENTION AND CONCENTRATION DEFICIT: ICD-10-CM

## 2023-09-14 DIAGNOSIS — F33.1 MODERATE EPISODE OF RECURRENT MAJOR DEPRESSIVE DISORDER (HCC): ICD-10-CM

## 2023-09-14 DIAGNOSIS — F41.1 GENERALIZED ANXIETY DISORDER WITH PANIC ATTACKS: ICD-10-CM

## 2023-09-14 RX ORDER — DESVENLAFAXINE 100 MG/1
100 TABLET, EXTENDED RELEASE ORAL DAILY
Qty: 30 TABLET | Refills: 0 | Status: SHIPPED | OUTPATIENT
Start: 2023-09-14

## 2023-09-14 RX ORDER — DESVENLAFAXINE SUCCINATE 50 MG/1
50 TABLET, EXTENDED RELEASE ORAL DAILY
Qty: 30 TABLET | Refills: 0 | Status: SHIPPED | OUTPATIENT
Start: 2023-09-14

## 2024-07-22 NOTE — PROGRESS NOTES
Orthopaedic Surgery - Office Note  Faustino Melendrez (49 y o  female)   : 1985   MRN: [id number]  Encounter Date: 2019    Chief Complaint   Patient presents with    Right Lower Leg - Follow-up       Assessment / Plan  S/p Achilles tendon repair 19, still wearing CAM boot and using rollator at work    · WBAT  · Continue with home exercise program and physical therapy  · Goal is to wean off rollator and CAM boot by next visit, ice and elevate as much as possible  Return in about 6 weeks (around 2020)  History of Present Illness  Faustino Melendrez is a 30 y o  female who presents for post-operative follow-up s/p R Achilles tendon repair  Initial injury occurred on 19  She states she is still having pain and swelling to her right Achilles  She is back at work on full duty  She still uses her CAM walker and rollator at work  She is frustrated with lack of progress now that she's back at work  Denies numbness or tingling  Review of Systems  Pertinent items are noted in HPI  All other systems were reviewed and are negative  Physical Exam  /97   Pulse 89   Ht 5' 9" (1 753 m)   Wt 109 kg (240 lb)   BMI 35 44 kg/m²   Cons: Appears well  No apparent distress  Psych: Alert  Oriented x3  Mood and affect normal   Eyes: PERRLA, EOMI  Resp: Normal effort  No audible wheezing or stridor  CV: Palpable pulse  No discernable arrhythmia  No LE edema  Lymph:  No palpable cervical, axillary, or inguinal lymphadenopathy  Skin: Warm  No palpable masses  No visible lesions  Neuro: Normal muscle tone  Normal and symmetric DTR's  Right Foot & Ankle Exam  Alignment:  Normal ankle alignment  Inspection:  ankle swelling  Palpation:  posterior ankle tenderness  ROM:  Ankle DF 5  Ankle PF normal   Strength:  Anterior tibialis 5/5  Gastroc/Soleus 4-/5  Stability:  No objective ankle instablity  (-) Anterior  neutral and PF  (-) Talar Tilt    Tests:  No pertinent positive or Rx #[id number]   Zolpidem 5MG tablets   Take 1 tablet 5MG by mouth daily at bedtime as needed for sleep  Last refill 07/20/2024   negative tests  Neurovascular:  Sensation intact in DP/SP/Crews/Sa/T nerve distributions  2+ DP & PT pulses  Gait:  Antalgic  Studies Reviewed  No studies to review    Procedures  No procedures today  Medical, Surgical, Family, and Social History  The patient's medical history, family history, and social history, were reviewed and updated as appropriate  Past Medical History:   Diagnosis Date    Chronic sinus infection     Depression     Post-op pain        Past Surgical History:   Procedure Laterality Date    ESOPHAGOGASTRODUODENOSCOPY N/A 8/15/2018    Procedure: ESOPHAGOGASTRODUODENOSCOPY (EGD); Surgeon: Zenon Barragan MD;  Location:  GI LAB;   Service: Gastroenterology    HAND SURGERY Right     KNEE ARTHROSCOPY Left     lateral meniscus tear, ACL tear    KNEE SURGERY Left     GA REPAIR ACHILLES TENDON,PRIMARY Right 9/30/2019    Procedure: REPAIR TENDON ACHILLES;  Surgeon: Kade Rodriguez MD;  Location[facility];  Service: Orthopedics    UPPER GASTROINTESTINAL ENDOSCOPY         Family History   Problem Relation Age of Onset    Depression Mother     Hypertension Mother     Diabetes Father     Hypertension Father     Cancer Father     Heart failure Father     Leukemia Father     No Known Problems Sister     No Known Problems Brother     Diabetes Paternal Grandmother     Hypertension Paternal Grandmother     Alcohol abuse Maternal Uncle     Depression Maternal Uncle     Mental illness Maternal Uncle     Alcohol abuse Cousin     Depression Cousin     Mental illness Cousin        Social History     Occupational History    Not on file   Tobacco Use    Smoking status: Never Smoker    Smokeless tobacco: Never Used   Substance and Sexual Activity    Alcohol use: Yes     Comment: social    Drug use: No    Sexual activity: Not on file       No Known Allergies      Current Outpatient Medications:     fexofenadine (ALLEGRA) 180 MG tablet, Take 1 tablet (180 mg total) by mouth daily (Patient taking differently: Take 180 mg by mouth as needed ), Disp: , Rfl: 0    ibuprofen (MOTRIN) 800 mg tablet, Take by mouth every 6 (six) hours as needed for mild pain, Disp: , Rfl:     aspirin (ECOTRIN) 325 mg EC tablet, Take 1 tablet (325 mg total) by mouth 2 (two) times a day for 14 days, Disp: 28 tablet, Rfl: 0    levonorgestrel (MIRENA) 20 MCG/24HR IUD, 1 Intra Uterine Device by Intrauterine route once for 1 dose (Patient taking differently: 1 each by Intrauterine route once ), Disp: 1 each, Rfl: 0    mometasone (NASONEX) 50 mcg/act nasal spray, 2 sprays into each nostril daily (Patient not taking: Reported on 12/11/2019), Disp: 17 g, Rfl: 5    ondansetron (ZOFRAN-ODT) 4 mg disintegrating tablet, Take 1 tablet (4 mg total) by mouth every 8 (eight) hours as needed for nausea or vomiting (Patient not taking: Reported on 12/11/2019), Disp: 15 tablet, Rfl: 0      Pk Ross DPM    Scribe Attestation    I,:    am acting as a scribe while in the presence of the attending physician :        I,:    personally performed the services described in this documentation    as scribed in my presence :

## 2024-09-18 NOTE — BH TREATMENT PLAN
TREATMENT PLAN (Medication Management Only)        [facility]    Name and Date of Birth:  Stacy Valentin 31 y o  1985  Date of Treatment Plan: September 3, 2021  Diagnosis/Diagnoses:    1  Chronic post-traumatic stress disorder (PTSD)    2  Anxiety    3  Moderate episode of recurrent major depressive disorder ([facility] )    4  Current moderate episode of major depressive disorder, unspecified whether recurrent ([facility] )    5  Attention and concentration deficit      Strengths/Personal Resources for Self-Care: taking medications as prescribed  Area/Areas of need (in own words): anxiety symptoms, concentration, focus  1  Long Term Goal: improve concentration  Target Date:2 months - 11/3/2021  Person/Persons responsible for completion of goal: Radha  2  Short Term Objective (s) - How will we reach this goal?:   A  Provider new recommended medication/dosage changes and/or continue medication(s): Increase Pristiq to 100 mg daily  B   Continue therapy  C  N/A  Target Date:2 months - 11/3/2021  Person/Persons Responsible for Completion of Goal: Radha  Progress Towards Goals: continuing treatment  Treatment Modality: medication management every 6 weeks  Review due 180 days from date of this plan: 6 months - 3/3/2022  Expected length of service: ongoing treatment  My Physician/PA/NP and I have developed this plan together and I agree to work on the goals and objectives  I understand the treatment goals that were developed for my treatment 
Her/She

## (undated) DEVICE — SUT FIBERWIRE #2 1/2 CIRCLE T-5 38IN AR-7200

## (undated) DEVICE — SUT VICRYL 2-0 CT-2 27 IN J269H

## (undated) DEVICE — SCD SEQUENTIAL COMPRESSION COMFORT SLEEVE MEDIUM KNEE LENGTH: Brand: KENDALL SCD

## (undated) DEVICE — GAUZE SPONGES,USP TYPE VII GAUZE, 12 PLY: Brand: CURITY

## (undated) DEVICE — CYSTO TUBING SINGLE IRRIGATION

## (undated) DEVICE — SUT MONOCRYL 4-0 PS-2 27 IN Y426H

## (undated) DEVICE — SYRINGE 20ML LL

## (undated) DEVICE — 3M™ STERI-DRAPE™ U-DRAPE 1015: Brand: STERI-DRAPE™

## (undated) DEVICE — PADDING CAST 4 IN  COTTON STRL

## (undated) DEVICE — 3M™ STERI-STRIP™ REINFORCED ADHESIVE SKIN CLOSURES, R1547, 1/2 IN X 4 IN (12 MM X 100 MM), 6 STRIPS/ENVELOPE: Brand: 3M™ STERI-STRIP™

## (undated) DEVICE — BETHLEHEM UNIVERSAL  MIONR EXT: Brand: CARDINAL HEALTH

## (undated) DEVICE — NEEDLE 18 G X 1 1/2

## (undated) DEVICE — INTENDED FOR TISSUE SEPARATION, AND OTHER PROCEDURES THAT REQUIRE A SHARP SURGICAL BLADE TO PUNCTURE OR CUT.: Brand: BARD-PARKER ® CARBON RIB-BACK BLADES

## (undated) DEVICE — 10FR FRAZIER SUCTION HANDLE: Brand: CARDINAL HEALTH

## (undated) DEVICE — 3000CC GUARDIAN II: Brand: GUARDIAN

## (undated) DEVICE — CHLORAPREP HI-LITE 26ML ORANGE

## (undated) DEVICE — ABDOMINAL PAD: Brand: DERMACEA

## (undated) DEVICE — COBAN 4 IN STERILE

## (undated) DEVICE — NEEDLE HYPO 22G X 1-1/2 IN

## (undated) DEVICE — WEBRIL 6 IN UNSTERILE

## (undated) DEVICE — TUBING SUCTION 5MM X 12 FT

## (undated) DEVICE — ACE WRAP 6 IN UNSTERILE

## (undated) DEVICE — UNDYED BRAIDED (POLYGLACTIN 910), SYNTHETIC ABSORBABLE SUTURE: Brand: COATED VICRYL

## (undated) DEVICE — PADDING CAST 6IN COTTON STRL

## (undated) DEVICE — CAST PLASTER 4 IN ROLL

## (undated) DEVICE — ACE WRAP 4 IN UNSTERILE

## (undated) DEVICE — GLOVE INDICATOR PI UNDERGLOVE SZ 8.5 BLUE

## (undated) DEVICE — GLOVE SRG BIOGEL 8

## (undated) DEVICE — GLOVE SRG BIOGEL 7.5

## (undated) DEVICE — OCCLUSIVE GAUZE STRIP,3% BISMUTH TRIBROMOPHENATE IN PETROLATUM BLEND: Brand: XEROFORM